# Patient Record
Sex: MALE | Race: WHITE | NOT HISPANIC OR LATINO | Employment: UNEMPLOYED | RURAL
[De-identification: names, ages, dates, MRNs, and addresses within clinical notes are randomized per-mention and may not be internally consistent; named-entity substitution may affect disease eponyms.]

---

## 2017-09-21 ENCOUNTER — OFFICE VISIT (OUTPATIENT)
Dept: CARDIOLOGY | Age: 57
End: 2017-09-21

## 2017-09-21 VITALS
OXYGEN SATURATION: 98 % | HEART RATE: 74 BPM | SYSTOLIC BLOOD PRESSURE: 112 MMHG | HEIGHT: 78 IN | BODY MASS INDEX: 35.87 KG/M2 | WEIGHT: 310 LBS | DIASTOLIC BLOOD PRESSURE: 68 MMHG

## 2017-09-21 DIAGNOSIS — I48.91 ATRIAL FIBRILLATION, UNSPECIFIED TYPE (CMD): Primary | ICD-10-CM

## 2017-09-21 PROCEDURE — 93000 ELECTROCARDIOGRAM COMPLETE: CPT | Performed by: INTERNAL MEDICINE

## 2017-09-21 PROCEDURE — 99204 OFFICE O/P NEW MOD 45 MIN: CPT | Performed by: INTERNAL MEDICINE

## 2017-09-21 RX ORDER — CARVEDILOL 3.12 MG/1
3.12 TABLET ORAL 2 TIMES DAILY WITH MEALS
COMMUNITY

## 2017-09-21 RX ORDER — LISINOPRIL 20 MG/1
20 TABLET ORAL DAILY
COMMUNITY

## 2017-09-21 RX ORDER — SPIRONOLACTONE 25 MG/1
25 TABLET ORAL DAILY
COMMUNITY

## 2017-09-21 RX ORDER — WARFARIN SODIUM 4 MG/1
4 TABLET ORAL DAILY
COMMUNITY
End: 2017-11-14 | Stop reason: SDUPTHER

## 2017-10-24 ENCOUNTER — HOSPITAL ENCOUNTER (OUTPATIENT)
Dept: CV DIAGNOSTICS | Age: 57
Discharge: HOME | End: 2017-10-24
Attending: INTERNAL MEDICINE

## 2017-10-24 ENCOUNTER — OFF PREMISE CHARGES (OUTPATIENT)
Dept: CARDIOLOGY | Age: 57
End: 2017-10-24

## 2017-10-24 DIAGNOSIS — I48.91 ATRIAL FIBRILLATION, UNSPECIFIED TYPE (CMD): ICD-10-CM

## 2017-10-24 DIAGNOSIS — I48.91 ATRIAL FIBRILLATION (CMD): ICD-10-CM

## 2017-10-24 LAB
ASCENDING AORTA (AAD): 3.7 CM
AV PEAK GRADIENT (AVPG): 8.9 MMHG
AV PEAK VELOCITY (AVPV): 1.5 M/S
DOP CALC LVOT PEAK VEL (LVOTPV): 0.8 M/S
EST RIGHT VENT SYSTOLIC PRESSURE BY TRICUSPID REGURGITATION JET (RVSP): 34 MMHG
INTERVENTRICULAR SEPTUM IN END DIASTOLE (IVSD): 1 CM
LEFT VENTRICULAR INTERNAL DIMENSION IN DIASTOLE (LVDD): 5.8 CM
LEFT VENTRICULAR POSTERIOR WALL IN END DIASTOLE (LVPW): 1.1 CM
LV EF: 35 %
LVOT 2D (LVOTD): 2.5 CM
MV E TISSUE VEL MED (MESV): 9 CM/S
MV E WAVE VEL/E TISSUE VEL MED(MSR): 9
MV PEAK E VELOCITY (MVPEV): 0.8 M/S
RV TISSUE DOPPLER FREE WALL HEART RATE (RVSTV): 0.2 M/S
TRICUSPID VALVE PEAK REGURGITATION VELOCITY (TRPV): 2.8 M/S
TV ESTIMATED RIGHT ARTERIAL PRESSURE (RAP): 3 MMHG

## 2017-10-24 PROCEDURE — 93306 TTE W/DOPPLER COMPLETE: CPT | Performed by: INTERNAL MEDICINE

## 2017-10-26 ENCOUNTER — TELEPHONE (OUTPATIENT)
Dept: CARDIOLOGY | Age: 57
End: 2017-10-26

## 2017-10-26 DIAGNOSIS — I48.91 ATRIAL FIBRILLATION, UNSPECIFIED TYPE (CMD): Primary | ICD-10-CM

## 2017-10-26 DIAGNOSIS — R93.1 ABNORMAL ECHOCARDIOGRAM: ICD-10-CM

## 2017-11-02 ENCOUNTER — LAB SERVICES (OUTPATIENT)
Dept: LAB | Age: 57
End: 2017-11-02

## 2017-11-02 ENCOUNTER — TELEPHONE (OUTPATIENT)
Dept: CARDIOLOGY | Age: 57
End: 2017-11-02

## 2017-11-02 DIAGNOSIS — I48.91 ATRIAL FIBRILLATION, UNSPECIFIED TYPE (CMD): Primary | ICD-10-CM

## 2017-11-02 DIAGNOSIS — G45.9 TRANSIENT CEREBRAL ISCHEMIA, UNSPECIFIED TYPE: ICD-10-CM

## 2017-11-02 DIAGNOSIS — Z51.81 ENCOUNTER FOR THERAPEUTIC DRUG MONITORING: ICD-10-CM

## 2017-11-02 DIAGNOSIS — I48.91 ATRIAL FIBRILLATION, UNSPECIFIED TYPE (CMD): ICD-10-CM

## 2017-11-02 PROCEDURE — 36415 COLL VENOUS BLD VENIPUNCTURE: CPT | Performed by: FAMILY MEDICINE

## 2017-11-02 PROCEDURE — 85610 PROTHROMBIN TIME: CPT | Performed by: INTERNAL MEDICINE

## 2017-11-03 ENCOUNTER — TELEPHONE (OUTPATIENT)
Dept: CARDIOLOGY | Age: 57
End: 2017-11-03

## 2017-11-03 DIAGNOSIS — I48.91 ATRIAL FIBRILLATION, UNSPECIFIED TYPE (CMD): ICD-10-CM

## 2017-11-03 DIAGNOSIS — G45.9 TRANSIENT CEREBRAL ISCHEMIA, UNSPECIFIED TYPE: ICD-10-CM

## 2017-11-03 DIAGNOSIS — Z51.81 ENCOUNTER FOR THERAPEUTIC DRUG MONITORING: ICD-10-CM

## 2017-11-03 LAB
INR PPP: 1.6
PROTHROMBIN TIME: 17.8 SEC (ref 9.7–11.8)

## 2017-11-06 ENCOUNTER — OFFICE VISIT (OUTPATIENT)
Dept: ELECTROPHYSIOLOGY | Age: 57
End: 2017-11-06
Attending: INTERNAL MEDICINE

## 2017-11-06 ENCOUNTER — TELEPHONE (OUTPATIENT)
Dept: ELECTROPHYSIOLOGY | Age: 57
End: 2017-11-06

## 2017-11-06 ENCOUNTER — TELEPHONE (OUTPATIENT)
Dept: CARDIOLOGY | Age: 57
End: 2017-11-06

## 2017-11-06 VITALS
DIASTOLIC BLOOD PRESSURE: 86 MMHG | BODY MASS INDEX: 36.45 KG/M2 | HEART RATE: 62 BPM | WEIGHT: 315 LBS | SYSTOLIC BLOOD PRESSURE: 130 MMHG | HEIGHT: 78 IN | OXYGEN SATURATION: 98 %

## 2017-11-06 DIAGNOSIS — I48.91 ATRIAL FIBRILLATION, UNSPECIFIED TYPE (CMD): ICD-10-CM

## 2017-11-06 DIAGNOSIS — Z51.81 ENCOUNTER FOR THERAPEUTIC DRUG MONITORING: ICD-10-CM

## 2017-11-06 DIAGNOSIS — I48.19 PERSISTENT ATRIAL FIBRILLATION (CMD): Primary | ICD-10-CM

## 2017-11-06 DIAGNOSIS — G45.9 TRANSIENT CEREBRAL ISCHEMIA, UNSPECIFIED TYPE: ICD-10-CM

## 2017-11-06 DIAGNOSIS — Z79.899 ENCOUNTER FOR MONITORING AMIODARONE THERAPY: Primary | ICD-10-CM

## 2017-11-06 DIAGNOSIS — Z51.81 ENCOUNTER FOR MONITORING AMIODARONE THERAPY: Primary | ICD-10-CM

## 2017-11-06 DIAGNOSIS — R93.1 ABNORMAL ECHOCARDIOGRAM: ICD-10-CM

## 2017-11-06 PROCEDURE — 99205 OFFICE O/P NEW HI 60 MIN: CPT | Performed by: INTERNAL MEDICINE

## 2017-11-06 PROCEDURE — 93010 ELECTROCARDIOGRAM REPORT: CPT | Performed by: INTERNAL MEDICINE

## 2017-11-06 PROCEDURE — 99211 OFF/OP EST MAY X REQ PHY/QHP: CPT | Performed by: INTERNAL MEDICINE

## 2017-11-06 PROCEDURE — 93005 ELECTROCARDIOGRAM TRACING: CPT | Performed by: INTERNAL MEDICINE

## 2017-11-06 RX ORDER — AMIODARONE HYDROCHLORIDE 200 MG/1
TABLET ORAL
Qty: 90 TABLET | Refills: 5 | Status: SHIPPED | OUTPATIENT
Start: 2017-11-06

## 2017-11-09 ENCOUNTER — LAB SERVICES (OUTPATIENT)
Dept: LAB | Age: 57
End: 2017-11-09

## 2017-11-09 ENCOUNTER — TELEPHONE (OUTPATIENT)
Dept: CARDIOLOGY | Age: 57
End: 2017-11-09

## 2017-11-09 DIAGNOSIS — Z51.81 ENCOUNTER FOR THERAPEUTIC DRUG MONITORING: ICD-10-CM

## 2017-11-09 DIAGNOSIS — G45.9 TRANSIENT CEREBRAL ISCHEMIA, UNSPECIFIED TYPE: ICD-10-CM

## 2017-11-09 DIAGNOSIS — I48.91 ATRIAL FIBRILLATION, UNSPECIFIED TYPE (CMD): ICD-10-CM

## 2017-11-09 PROCEDURE — 36415 COLL VENOUS BLD VENIPUNCTURE: CPT | Performed by: FAMILY MEDICINE

## 2017-11-09 PROCEDURE — 85610 PROTHROMBIN TIME: CPT | Performed by: INTERNAL MEDICINE

## 2017-11-10 ENCOUNTER — TELEPHONE (OUTPATIENT)
Dept: CARDIOLOGY | Age: 57
End: 2017-11-10

## 2017-11-10 DIAGNOSIS — Z51.81 ENCOUNTER FOR THERAPEUTIC DRUG MONITORING: ICD-10-CM

## 2017-11-10 DIAGNOSIS — I48.91 ATRIAL FIBRILLATION, UNSPECIFIED TYPE (CMD): ICD-10-CM

## 2017-11-10 DIAGNOSIS — G45.9 TRANSIENT CEREBRAL ISCHEMIA, UNSPECIFIED TYPE: ICD-10-CM

## 2017-11-10 LAB
INR PPP: 2
PROTHROMBIN TIME: 22.1 SEC (ref 9.7–11.8)

## 2017-11-13 DIAGNOSIS — I48.19 PERSISTENT ATRIAL FIBRILLATION (CMD): Primary | ICD-10-CM

## 2017-11-13 RX ORDER — LIDOCAINE HYDROCHLORIDE 10 MG/ML
.5-1 INJECTION, SOLUTION INFILTRATION; PERINEURAL PRN
Status: CANCELLED | OUTPATIENT
Start: 2017-11-13

## 2017-11-13 RX ORDER — SODIUM CHLORIDE, SODIUM LACTATE, POTASSIUM CHLORIDE, CALCIUM CHLORIDE 600; 310; 30; 20 MG/100ML; MG/100ML; MG/100ML; MG/100ML
INJECTION, SOLUTION INTRAVENOUS CONTINUOUS
Status: CANCELLED | OUTPATIENT
Start: 2017-11-13

## 2017-11-13 RX ORDER — 0.9 % SODIUM CHLORIDE 0.9 %
2 VIAL (ML) INJECTION EVERY 12 HOURS SCHEDULED
Status: CANCELLED | OUTPATIENT
Start: 2017-11-13

## 2017-11-13 RX ORDER — 0.9 % SODIUM CHLORIDE 0.9 %
2 VIAL (ML) INJECTION PRN
Status: CANCELLED | OUTPATIENT
Start: 2017-11-13

## 2017-11-13 RX ORDER — LIDOCAINE AND PRILOCAINE 25; 25 MG/G; MG/G
1 CREAM TOPICAL PRN
Status: CANCELLED | OUTPATIENT
Start: 2017-11-13

## 2017-11-13 RX ORDER — POTASSIUM CHLORIDE 14.9 MG/ML
40 INJECTION INTRAVENOUS PRN
Status: CANCELLED | OUTPATIENT
Start: 2017-11-13

## 2017-11-13 RX ORDER — SODIUM CHLORIDE 9 MG/ML
INJECTION, SOLUTION INTRAVENOUS CONTINUOUS
Status: CANCELLED | OUTPATIENT
Start: 2017-11-13

## 2017-11-13 RX ORDER — HUMAN INSULIN 100 [IU]/ML
INJECTION, SOLUTION SUBCUTANEOUS
Status: CANCELLED | OUTPATIENT
Start: 2017-11-13

## 2017-11-14 ENCOUNTER — TELEPHONE (OUTPATIENT)
Dept: CARDIOLOGY | Age: 57
End: 2017-11-14

## 2017-11-14 DIAGNOSIS — I48.91 ATRIAL FIBRILLATION, UNSPECIFIED TYPE (CMD): ICD-10-CM

## 2017-11-14 RX ORDER — WARFARIN SODIUM 4 MG/1
TABLET ORAL
Qty: 30 TABLET | Refills: 5 | Status: SHIPPED | OUTPATIENT
Start: 2017-11-14

## 2017-11-16 ENCOUNTER — TELEPHONE (OUTPATIENT)
Dept: CARDIOLOGY | Age: 57
End: 2017-11-16

## 2017-11-16 ENCOUNTER — LAB SERVICES (OUTPATIENT)
Dept: LAB | Age: 57
End: 2017-11-16

## 2017-11-16 DIAGNOSIS — I48.91 ATRIAL FIBRILLATION, UNSPECIFIED TYPE (CMD): ICD-10-CM

## 2017-11-16 PROCEDURE — 36415 COLL VENOUS BLD VENIPUNCTURE: CPT | Performed by: FAMILY MEDICINE

## 2017-11-16 PROCEDURE — 85610 PROTHROMBIN TIME: CPT | Performed by: INTERNAL MEDICINE

## 2017-11-17 ENCOUNTER — TELEPHONE (OUTPATIENT)
Dept: CARDIOLOGY | Age: 57
End: 2017-11-17

## 2017-11-17 DIAGNOSIS — Z51.81 ENCOUNTER FOR THERAPEUTIC DRUG MONITORING: ICD-10-CM

## 2017-11-17 DIAGNOSIS — I48.91 ATRIAL FIBRILLATION, UNSPECIFIED TYPE (CMD): ICD-10-CM

## 2017-11-17 DIAGNOSIS — G45.9 TRANSIENT CEREBRAL ISCHEMIA, UNSPECIFIED TYPE: ICD-10-CM

## 2017-11-17 LAB
INR PPP: 2.7
PROTHROMBIN TIME: 29 SEC (ref 9.7–11.8)

## 2017-11-22 ENCOUNTER — LAB SERVICES (OUTPATIENT)
Dept: LAB | Age: 57
End: 2017-11-22

## 2017-11-22 DIAGNOSIS — I48.91 ATRIAL FIBRILLATION, UNSPECIFIED TYPE (CMD): ICD-10-CM

## 2017-11-22 PROCEDURE — 36415 COLL VENOUS BLD VENIPUNCTURE: CPT | Performed by: FAMILY MEDICINE

## 2017-11-22 PROCEDURE — 85610 PROTHROMBIN TIME: CPT | Performed by: INTERNAL MEDICINE

## 2017-11-23 LAB
INR PPP: 3.7
PROTHROMBIN TIME: 40.4 SEC (ref 9.7–11.8)

## 2017-11-24 ENCOUNTER — TELEPHONE (OUTPATIENT)
Dept: CARDIOLOGY | Age: 57
End: 2017-11-24

## 2017-11-24 ENCOUNTER — TELEPHONE (OUTPATIENT)
Dept: ELECTROPHYSIOLOGY | Age: 57
End: 2017-11-24

## 2017-11-24 DIAGNOSIS — I48.91 ATRIAL FIBRILLATION, UNSPECIFIED TYPE (CMD): ICD-10-CM

## 2017-11-24 DIAGNOSIS — G45.9 TRANSIENT CEREBRAL ISCHEMIA, UNSPECIFIED TYPE: ICD-10-CM

## 2017-11-24 DIAGNOSIS — Z51.81 ENCOUNTER FOR THERAPEUTIC DRUG MONITORING: ICD-10-CM

## 2017-11-27 ENCOUNTER — TELEPHONE (OUTPATIENT)
Dept: ELECTROPHYSIOLOGY | Age: 57
End: 2017-11-27

## 2017-12-01 ENCOUNTER — TELEPHONE (OUTPATIENT)
Dept: CARDIOLOGY | Age: 57
End: 2017-12-01

## 2017-12-01 DIAGNOSIS — Z51.81 ENCOUNTER FOR THERAPEUTIC DRUG MONITORING: ICD-10-CM

## 2017-12-01 DIAGNOSIS — I48.91 ATRIAL FIBRILLATION, UNSPECIFIED TYPE (CMD): ICD-10-CM

## 2017-12-01 DIAGNOSIS — G45.9 TRANSIENT CEREBRAL ISCHEMIA, UNSPECIFIED TYPE: ICD-10-CM

## 2017-12-07 ENCOUNTER — TELEPHONE (OUTPATIENT)
Dept: FAMILY MEDICINE | Age: 57
End: 2017-12-07

## 2017-12-07 ENCOUNTER — LAB SERVICES (OUTPATIENT)
Dept: LAB | Age: 57
End: 2017-12-07

## 2017-12-07 DIAGNOSIS — I48.91 ATRIAL FIBRILLATION, UNSPECIFIED TYPE (CMD): ICD-10-CM

## 2017-12-07 LAB
INR PPP: 2.8
PROTHROMBIN TIME: 26.9 SEC (ref 9.7–11.8)

## 2017-12-07 PROCEDURE — 85610 PROTHROMBIN TIME: CPT | Performed by: INTERNAL MEDICINE

## 2017-12-07 PROCEDURE — 36415 COLL VENOUS BLD VENIPUNCTURE: CPT | Performed by: FAMILY MEDICINE

## 2017-12-08 ENCOUNTER — TELEPHONE (OUTPATIENT)
Dept: CARDIOLOGY | Age: 57
End: 2017-12-08

## 2017-12-08 DIAGNOSIS — G45.9 TRANSIENT CEREBRAL ISCHEMIA, UNSPECIFIED TYPE: ICD-10-CM

## 2017-12-08 DIAGNOSIS — I48.91 ATRIAL FIBRILLATION, UNSPECIFIED TYPE (CMD): ICD-10-CM

## 2017-12-08 DIAGNOSIS — Z51.81 ENCOUNTER FOR THERAPEUTIC DRUG MONITORING: ICD-10-CM

## 2017-12-14 ENCOUNTER — APPOINTMENT (OUTPATIENT)
Dept: CT IMAGING | Age: 57
End: 2017-12-14
Attending: INTERNAL MEDICINE

## 2017-12-18 ENCOUNTER — APPOINTMENT (OUTPATIENT)
Dept: CV DIAGNOSTICS | Age: 57
End: 2017-12-18
Attending: INTERNAL MEDICINE

## 2017-12-29 ENCOUNTER — TELEPHONE (OUTPATIENT)
Dept: CARDIOLOGY | Age: 57
End: 2017-12-29

## 2018-01-04 ENCOUNTER — TELEPHONE (OUTPATIENT)
Dept: CARDIOLOGY | Age: 58
End: 2018-01-04

## 2018-01-04 DIAGNOSIS — Z51.81 ENCOUNTER FOR THERAPEUTIC DRUG MONITORING: ICD-10-CM

## 2018-01-04 DIAGNOSIS — I48.91 ATRIAL FIBRILLATION, UNSPECIFIED TYPE (CMD): ICD-10-CM

## 2018-01-04 DIAGNOSIS — G45.9 TRANSIENT CEREBRAL ISCHEMIA, UNSPECIFIED TYPE: ICD-10-CM

## 2018-01-09 ENCOUNTER — TELEPHONE (OUTPATIENT)
Dept: CARDIOLOGY | Age: 58
End: 2018-01-09

## 2018-01-09 DIAGNOSIS — G45.9 TRANSIENT CEREBRAL ISCHEMIA, UNSPECIFIED TYPE: ICD-10-CM

## 2018-01-09 DIAGNOSIS — I48.91 ATRIAL FIBRILLATION, UNSPECIFIED TYPE (CMD): ICD-10-CM

## 2018-01-09 DIAGNOSIS — Z51.81 ENCOUNTER FOR THERAPEUTIC DRUG MONITORING: ICD-10-CM

## 2018-01-26 ENCOUNTER — APPOINTMENT (OUTPATIENT)
Dept: ELECTROPHYSIOLOGY | Age: 58
End: 2018-01-26
Attending: INTERNAL MEDICINE

## 2022-01-14 DIAGNOSIS — U07.1 COVID-19: Primary | ICD-10-CM

## 2022-01-14 RX ORDER — ALBUTEROL SULFATE 90 UG/1
2-3 INHALANT RESPIRATORY (INHALATION) AS NEEDED
Status: CANCELLED | OUTPATIENT
Start: 2022-01-18

## 2022-01-14 RX ORDER — DIPHENHYDRAMINE HYDROCHLORIDE 50 MG/ML
25-50 INJECTION INTRAMUSCULAR; INTRAVENOUS AS NEEDED
Status: CANCELLED | OUTPATIENT
Start: 2022-01-18

## 2022-01-14 RX ORDER — ACETAMINOPHEN 500 MG
500 TABLET ORAL EVERY 4 HOURS PRN
Status: CANCELLED | OUTPATIENT
Start: 2022-01-18

## 2022-01-14 RX ORDER — SODIUM CHLORIDE 9 MG/ML
0-999 INJECTION, SOLUTION INTRAVENOUS CONTINUOUS PRN
Status: CANCELLED | OUTPATIENT
Start: 2022-01-18 | End: 2022-01-18

## 2022-01-14 RX ORDER — FAMOTIDINE 10 MG/ML
20 INJECTION INTRAVENOUS AS NEEDED
Status: CANCELLED | OUTPATIENT
Start: 2022-01-18

## 2022-01-14 RX ORDER — EPINEPHRINE 1 MG/ML
0.3 INJECTION INTRAMUSCULAR; INTRAVENOUS; SUBCUTANEOUS AS NEEDED
Status: CANCELLED | OUTPATIENT
Start: 2022-01-18

## 2022-01-18 ENCOUNTER — HOSPITAL ENCOUNTER (OUTPATIENT)
Dept: INFUSION THERAPY | Facility: HOSPITAL | Age: 62
Discharge: HOME | End: 2022-01-18
Payer: MEDICARE

## 2022-01-18 VITALS
SYSTOLIC BLOOD PRESSURE: 116 MMHG | RESPIRATION RATE: 18 BRPM | DIASTOLIC BLOOD PRESSURE: 57 MMHG | OXYGEN SATURATION: 94 % | TEMPERATURE: 97.8 F | HEART RATE: 68 BPM

## 2022-01-18 DIAGNOSIS — U07.1 COVID-19: Primary | ICD-10-CM

## 2022-01-18 PROCEDURE — 6360000200 HC RX 636 W HCPCS (ALT 250 FOR IP): Mod: FB | Performed by: NURSE PRACTITIONER

## 2022-01-18 PROCEDURE — M0247 HC IV INFUSION, SOTROVIMAB, INCLUDES INFUSION AND POST ADMINISTRATION MONITORING: HCPCS | Performed by: NURSE PRACTITIONER

## 2022-01-18 RX ADMIN — SOTROVIMAB 500 MG: 62.5 INJECTION, SOLUTION, CONCENTRATE INTRAVENOUS at 09:38

## 2022-03-28 ENCOUNTER — TELEPHONE (OUTPATIENT)
Dept: RHEUMATOLOGY | Facility: CLINIC | Age: 62
End: 2022-03-28
Payer: MEDICARE

## 2022-03-28 ENCOUNTER — OFFICE VISIT (OUTPATIENT)
Dept: RHEUMATOLOGY | Facility: CLINIC | Age: 62
End: 2022-03-28
Payer: MEDICARE

## 2022-03-28 VITALS
BODY MASS INDEX: 39.31 KG/M2 | SYSTOLIC BLOOD PRESSURE: 121 MMHG | WEIGHT: 315 LBS | RESPIRATION RATE: 20 BRPM | HEART RATE: 82 BPM | OXYGEN SATURATION: 97 % | DIASTOLIC BLOOD PRESSURE: 62 MMHG

## 2022-03-28 DIAGNOSIS — Z71.89 OTHER SPECIFIED COUNSELING: ICD-10-CM

## 2022-03-28 DIAGNOSIS — M10.9 GOUT OF MULTIPLE SITES, UNSPECIFIED CAUSE, UNSPECIFIED CHRONICITY: Primary | ICD-10-CM

## 2022-03-28 DIAGNOSIS — Z79.899 MEDICATION MANAGEMENT: ICD-10-CM

## 2022-03-28 DIAGNOSIS — M79.646 PAIN OF FINGER, UNSPECIFIED LATERALITY: ICD-10-CM

## 2022-03-28 PROCEDURE — 99205 OFFICE O/P NEW HI 60 MIN: CPT | Performed by: INTERNAL MEDICINE

## 2022-03-28 PROCEDURE — G0463 HOSPITAL OUTPT CLINIC VISIT: HCPCS | Mod: PO | Performed by: INTERNAL MEDICINE

## 2022-03-28 RX ORDER — FEBUXOSTAT 80 MG/1
TABLET, FILM COATED ORAL
Qty: 37 TABLET | Refills: 0 | Status: SHIPPED | OUTPATIENT
Start: 2022-03-28 | End: 2022-04-15 | Stop reason: DRUGHIGH

## 2022-03-28 RX ORDER — COLCHICINE 0.6 MG/1
0.6 TABLET ORAL DAILY
Qty: 30 TABLET | Refills: 0 | Status: SHIPPED | OUTPATIENT
Start: 2022-03-28 | End: 2022-03-28

## 2022-03-28 RX ORDER — COLCHICINE 0.6 MG/1
0.6 TABLET ORAL EVERY OTHER DAY
Qty: 15 TABLET | Refills: 0 | Status: SHIPPED | OUTPATIENT
Start: 2022-03-28 | End: 2022-05-24 | Stop reason: SDUPTHER

## 2022-03-28 ASSESSMENT — PAIN SCALES - GENERAL: PAINLEVEL: 3

## 2022-03-28 NOTE — LETTER
Formerly [facility], [facility] RHEUMATOLOGY  [address] [facility] [address]  [phone]  Dept: [phone]  04/01/22      TONO Blank  [address] [facility] [place] [address]        To: TONO Blank      Thank you for referring your patient, Brando Sexton, to receive care in my office. I have enclosed a summary of the care provided to Brando on 04/01/22.    Please contact me with any questions you may have regarding the visit.    Sincerely,         Doris Yates MD  [address] [facility] [place] [address]  [phone]    CC: TONO Grimes

## 2022-03-28 NOTE — PATIENT INSTRUCTIONS
1 we need to optimize your gout to have your uric acid below 6 so you stop having gout flares.    You were going to start colchicine 1 tablet every other day to prevent gout flares as we are lowering your uric acid.    2.  To reduce your uric acid I am prescribing Uloric you will take half of a tablet daily for 14 days then go get labs at your local [place] facility.  The labs will be under my name.    3.  After you get the labs we will call you with the uric acid level and ask you to increase the dose to 80 mg daily.  In 2 weeks once again we will go get a uric acid and see what the level is.  I will see you in 2 months after beginning the medication for a follow-up visit and we will go from there.

## 2022-03-28 NOTE — TELEPHONE ENCOUNTER
Called Magali at [facility] [phone] who will fax over the impression notes for now until images can be pushed.  Stephany calls now to advise she is also pushing images as we speak.

## 2022-03-28 NOTE — PROGRESS NOTES
HPI:  Brando Sexton is a 62 y.o. male with a history of gout who is here for an initial consultation for right fifth PIP swelling and pain.    He was evaluated by [facility] urgent care on February 25, 2022 for right hand pain with the inability to flex his fifth finger.  The digit did have erythema and tenderness as well as decreased range of motion.  He also had similar symptoms at that time involving his third right finger.  In the note they mentioned he had been seen 2 other times for the same type of inflammatory arthritis and had been given steroids as well as a shot of ceftriaxone previously.  They referred him for further evaluation and also noted he has a history of psoriasis.    Jc states that he does not have extensive psoriasis on his skin.  He states that he has gout and is doing all he can by monitoring his diet and ensuring that he does not eat shellfish and minimizes his red meat.  He also is taking vitamin C every day.  Previously he had been given probenecid which did not prevent or treat his gout flares.  As far as I am aware he does not have a history of chronic kidney disease but has a past medical history of atrial fibrillation and is on chronic anticoagulation with warfarin.    He states that he has had gout for many years and has been given allopurinol in the past but did have side effects of mood changes on allopurinol so it is contraindicated.  NSAIDs are contraindicated due to his chronic anticoagulation.  His last uric acid was in the 10 range and previous to that it was in the 11 range.    We did discuss the mechanism of gout as well as how it is treated acutely and chronically.  We did go over the handout from the [employer] of [employer] on gout.  I did discuss with him that our goal will be a uric acid below 6.    Unfortunately there is only one uric acid lowering medication left that is oral and that is Uloric.  He has no contraindications to Uloric.  His  cardiovascular disease is under good control as is his blood pressure.  He was recently switched from diltiazem to Lasix which of course exacerbated his gout.  I am unsure as to whether losartan has been tried or not in the past.  This is a medication that can help reduce gout flares and treat hypertension at the same time.    Today in his right hand he continues to experience erythema in the fifth digit as well as inflammation and swelling along his second and third MCP.  Morning stiffness in his joints is approximately 2 to 3 hours.  Pain over the last week is 6 out of 10.  He describes severe pain in his right fifth finger and moderate pain in his left knee and right fingers, right knee, right ankle and right toes.  He describes mild pain in his right wrist, right hip, left fingers and left ankle.  He is currently not taking probenecid.  Review of systems is positive for dental problems, tinnitus, joint swelling and pain.    ROS:  See HPI    History:  Past Medical History:   Diagnosis Date   • Atrial fibrillation (CMS/HCC) (HCC)    • Gout    • Psoriasis      Past Surgical History:   Procedure Laterality Date   • CARDIOVERSION  03/2010   • COLONOSCOPY  01/01/1995   • ORTHOPEDIC SURGERY Bilateral     Knee Scope   • OTHER SURGICAL HISTORY Right     Orthopedic:Rotator cuff x2 Right shoulder     Social History     Socioeconomic History   • Marital status:      Spouse name: Not on file   • Number of children: Not on file   • Years of education: Not on file   • Highest education level: Not on file   Occupational History   • Not on file   Tobacco Use   • Smoking status: Former Smoker   • Smokeless tobacco: Former User   Substance and Sexual Activity   • Alcohol use: No   • Drug use: Not on file   • Sexual activity: Not on file   Other Topics Concern   • Not on file   Social History Narrative   • Not on file     Social Determinants of Health     Financial Resource Strain: Not on file   Food Insecurity: Not on file    Transportation Needs: Not on file   Physical Activity: Not on file   Stress: Not on file   Social Connections: Not on file   Intimate Partner Violence: Not on file   Housing Stability: Not on file     Family History   Problem Relation Age of Onset   • Heart disease Mother    • Colon cancer Mother    • Heart disease Father         MI at age 87   • Other Father         Crohns   • Psoriasis Father    • Asthma Father    • Diabetes type II Father's Sister    • Heart disease Father's Brother        Objective:  Vitals:    03/28/22 0901 03/28/22 0905   BP: 121/62    BP Location: Left arm    Patient Position: Sitting    Cuff Size: Large Adult    Pulse: 82    Resp: 20    SpO2:  97%   Weight: (!) 145.5 kg (320 lb 12.8 oz)       Physical Exam  Limited to his hands right fifth PIP was erythematous and slightly tender and unable to extend straight.  He had fullness of his second and third right MCP.    Allergies:  Allopurinol and Cephalexin    Medications:  Current Outpatient Medications on File Prior to Visit   Medication Sig Dispense Refill   • CARVEDILOL ORAL Take by mouth Unknown mg     • ascorbic acid (VITAMIN C ORAL) Take by mouth     • lisinopril (PRINIVIL,ZESTRIL) 10 mg tablet Take 1 tablet(s) every day by oral route. 30 2   • furosemide (LASIX) 20 mg tablet 2/day 180 3   • warfarin (COUMADIN) 5 mg tablet 4 mg Alternates 2 mg with 4 mg every other day. 50 0   • albuterol HFA (PROVENTIL HFA;VENTOLIN HFA) 90 mcg/actuation inhaler Inhale 2 puffs every 4 hours by inhalation route. 1 0     No current facility-administered medications on file prior to visit.         Labs:  CBC:     WBC   Date Value Ref Range Status   01/18/2016 13.0 (H) 3.7 - 9.6 X10      RBC   Date Value Ref Range Status   01/18/2016 5.04 4.10 - 5.80 X10      Hemoglobin   Date Value Ref Range Status   01/18/2016 15.9 13.2 - 17.2 g/dL      Hematocrit   Date Value Ref Range Status   01/18/2016 47.8 38.0 - 50.0 %      Platelets   Date Value Ref Range Status    01/18/2016 209 140 - 450 10      CMP:   Sodium   Date Value Ref Range Status   08/10/2015 141 135 - 145 mmol/L      Potassium   Date Value Ref Range Status   08/10/2015 4.3 3.6 - 5.0 mmol/L      Chloride   Date Value Ref Range Status   08/10/2015 108 (H) 98 - 107 mmol/L      CO2   Date Value Ref Range Status   08/10/2015 25 21 - 32 mmol/L      Glucose   Date Value Ref Range Status   08/10/2015 102 70 - 110 mg/dL      Creatinine   Date Value Ref Range Status   08/10/2015 1.2 0.8 - 1.3 mg/dL      Calcium   Date Value Ref Range Status   08/10/2015 8.7 8.5 - 10.1 mg/dL      Albumin   Date Value Ref Range Status   08/10/2015 3.7 3.4 - 5.0 g/dL      Alkaline Phosphatase   Date Value Ref Range Status   08/10/2015 96 45 - 115 IU/L      Total Bilirubin   Date Value Ref Range Status   08/10/2015 0.77 0.00 - 1.40 mg/dL      ALT (SGPT)   Date Value Ref Range Status   08/10/2015 40 <78 IU/L      AST   Date Value Ref Range Status   08/10/2015 30 0 - 37 IU/L      BUN   Date Value Ref Range Status   08/10/2015 20 7 - 22 mg/dL      Anion Gap   Date Value Ref Range Status   08/10/2015 9 3 - 11 mmol/L        Labs dated 3/20/2022 included a uric acid of 10.2.  On February 25, 2022 he tested negative for the antinuclear antibody.  His C-reactive protein was 26 mm/h and his uric acid level was elevated at 11.4.        Assessment/Plan:    Diagnoses and all orders for this visit:    Gout of multiple sites, unspecified cause, unspecified chronicity  -     CBC w/auto differential Blood, Venous; Future  -     Comprehensive metabolic panel Blood, Venous; Future  -     C-reactive protein (Inflammation) Blood, Venous; Future  -     Sedimentation rate, automated Blood, Venous; Future  -     Uric acid Blood, Venous; Future  -     febuxostat (ULORIC) 80 mg tablet; Take 0.5 tablets (40 mg total) by mouth daily for 14 days, THEN 1 tablet (80 mg total) daily.  -     Discontinue: colchicine 0.6 mg tablet; Take 1 tablet (0.6 mg total) by mouth  daily  -     colchicine 0.6 mg tablet; Take 1 tablet (0.6 mg total) by mouth every other day    Pain of finger, unspecified laterality  -     Ambulatory referral to Rheumatology    Other specified counseling    Medication management        Plan of Care:  Jc is a very pleasant 62-year-old male with chronic longstanding gout that has moderate disease activity and his last uric acid was 10.2.     We are going to optimize his gout by starting him on colchicine at a dose of 0.6 mg every other day.  He is going to be on the reduced dose for 30 days.  He is on a reduced dose due to the interaction of his beta-blocker causing increased levels of colchicine.  I did explain to him that the purpose of the colchicine was to treat any inflammation or acute gout attacks well we are reducing his uric acid level with Uloric.    He has no contraindications to Uloric.  He is going to start 40 mg p.o. daily for 2 weeks then he is going to go in for a uric acid and other medication monitoring labs.  Then he is going to increase the dose to 80 mg p.o. daily.  We will wait another 2 weeks and obtain another uric acid level at that time.  I will see him back in 2 months and hopefully at that point his uric acid level will be near a goal of 6.    For acute flares I am going to recommend glucocorticoids versus allopurinol because of the interaction with the beta-blocker.  We did discuss that there are no other uric acid lowering medications that are available to him.  Krystexxa is generally reserved for tophaceous gout which she does not have.  We did go over diet modifications to reduce gout flares.  I also did recommend that if his primary care is able to switch his ACE inhibitor to losartan that may benefit his gout.    Discussed all in detail patient voiced understanding and is in agreement with plan.    A voice recognition program was used to aid in documentation of this record.  Sometimes words are not printed exactly as they were  spoken.  While efforts were made to carefully edit and correct any inaccuracies, some errors may be present; please take these into context.  Please contact the provider if areas are identified.    On this date of service 60 minutes of total time was spent in evaluation and management of this encounter.  On this date of service 60 minutes were spent in other reportable services.     Doris Yates MD    12:24 PM, 3/28/2022

## 2022-03-28 NOTE — TELEPHONE ENCOUNTER
Per Dr. Yates's request patient's pharmacy has been contacted to ensure that patient's Colchicine is filled with instruction to take the medication every other day. Spoke will Nelli and she states that they will change the prescription. Nothing further is needed at this time.

## 2022-03-28 NOTE — TELEPHONE ENCOUNTER
----- Message from Doris Yates MD sent at 3/27/2022 10:22 PM MDT -----  Please have the referring facility push imaging of his hands to us so I can review the images dueing his visit

## 2022-03-28 NOTE — TELEPHONE ENCOUNTER
Received voicemail from Maksim with [facility] pharmacy requesting a return phone call in regards to patient's prescription.  Returned phone call to [facility] pharmacy and spoke with Marilou. Marilou advises that there is an interaction between the patient's carvedilol and colchicine. Advised Marilou that per Dr. Yates's visit note she is aware of the interaction between the two medications and patient is taking the medication at a reduced dose of every other day for 30 days. Marilou verbalizes understanding. Nothing further is needed at this time.

## 2022-03-31 ENCOUNTER — TELEPHONE (OUTPATIENT)
Dept: RHEUMATOLOGY | Facility: CLINIC | Age: 62
End: 2022-03-31
Payer: MEDICARE

## 2022-03-31 NOTE — TELEPHONE ENCOUNTER
Brando returned a call back to the office. He is questioning if we can send Dr. Yates's recommendations on this information to his PCP so she is aware. Advised Dr. Yates has not signed her note yet but once it is signed we can forward it to his PCP Sydnee Zhou PA-C at the [facility]. He states understanding and agreement.    Dr. Yates, for your review. It looks like you already have your note set to send to his PCP once signed.

## 2022-03-31 NOTE — TELEPHONE ENCOUNTER
I returned a call back to Brando at phone number [phone] and advised him of Dr. Yates's note/recommendations below. He states understanding and will follow up with his PCP. He does report on the medication list on his AVS it shows two tablets daily under his Furosemide. He questions if we changed this medication. Recommended he take that medication as prescribed by whoever fills for him. Advised we did not change how he takes this medication. Advised whoever completed his medication listed when checking him in may have accidentally marked two daily. Recommended he again take as his PCP has prescribed/advised. He states understanding and agreement.

## 2022-03-31 NOTE — TELEPHONE ENCOUNTER
"Doris Yates MD  [facility] Rheumatology Staff ([place]) (Nurse) 4 minutes ago (2:41 PM)     I did not make changes to his furosemide. I only gave \"recommendations\" about his BP meds. It is up to his PCP to see if this is a good option. I can not dictate how his BP is treated that is up to his PCP    Message text      "

## 2022-03-31 NOTE — TELEPHONE ENCOUNTER
Patient calls into clinic and leaves voicemail inquiring if Dr. Yates has contacted his PCP in regards to changing his BP medication. Visit note on 03/28/2022 indicates that it would be beneficial for patient to be switched from his current ACE inhibitor to Losartan. Patient also states that on his after visit summary it states he takes his Furosemide twice daily. Patient states he believes he only takes it once daily.   Please review and advise on patient's BP medication. Will advise patient to continue taking his Furosemide as prescribed.

## 2022-04-01 NOTE — TELEPHONE ENCOUNTER
Per Dr. Yates: Note signed please send to PCP     Visit note routed to patient's PCP per request.

## 2022-04-12 ENCOUNTER — TELEPHONE (OUTPATIENT)
Dept: RHEUMATOLOGY | Facility: CLINIC | Age: 62
End: 2022-04-12
Payer: MEDICARE

## 2022-04-12 NOTE — TELEPHONE ENCOUNTER
Patient calls into clinic today and leaves a voicemail in regards to repeat labs. Patient requests return phone call.  Returned phone call to patient. Patient states he is unsure when her was suppose to have his labs drawn again. Advised patient that according to visit note on 03/28/2022 patient was instructed to repeat his labs 14 days after starting Uloric 40 mg daily. Patient states he was unable to start his Uloric until 03/30/2022. Patient states he will make an appointment at his local [facility] lab for 04/14/2022. Will await lab results.

## 2022-04-14 ENCOUNTER — APPOINTMENT (OUTPATIENT)
Dept: LAB | Facility: CLINIC | Age: 62
End: 2022-04-14
Payer: MEDICARE

## 2022-04-14 DIAGNOSIS — M10.9 GOUT OF MULTIPLE SITES, UNSPECIFIED CAUSE, UNSPECIFIED CHRONICITY: ICD-10-CM

## 2022-04-14 LAB
ALBUMIN SERPL-MCNC: 3.7 G/DL (ref 3.5–5.3)
ALP SERPL-CCNC: 79 U/L (ref 45–115)
ALT SERPL-CCNC: 12 U/L (ref 7–52)
ANION GAP SERPL CALC-SCNC: 6 MMOL/L (ref 3–11)
AST SERPL-CCNC: 15 U/L
BASOPHILS # BLD AUTO: 0.1 10*3/UL
BASOPHILS NFR BLD AUTO: 1 % (ref 0–2)
BILIRUB SERPL-MCNC: 0.77 MG/DL (ref 0.2–1.4)
BUN SERPL-MCNC: 24 MG/DL (ref 7–25)
CALCIUM ALBUM COR SERPL-MCNC: 9.2 MG/DL (ref 8.6–10.3)
CALCIUM SERPL-MCNC: 9 MG/DL (ref 8.6–10.3)
CHLORIDE SERPL-SCNC: 107 MMOL/L (ref 98–107)
CO2 SERPL-SCNC: 27 MMOL/L (ref 21–32)
CREAT SERPL-MCNC: 1.31 MG/DL (ref 0.7–1.3)
CRP SERPL-MCNC: 3.8 MG/L
EOSINOPHIL # BLD AUTO: 0.8 10*3/UL
EOSINOPHIL NFR BLD AUTO: 11 % (ref 0–3)
ERYTHROCYTE [DISTWIDTH] IN BLOOD BY AUTOMATED COUNT: 15.1 % (ref 11.5–15)
ERYTHROCYTE [SEDIMENTATION RATE] IN BLOOD: 13 MM/HR
GFR SERPL CREATININE-BSD FRML MDRD: 61 ML/MIN/1.73M*2
GLUCOSE SERPL-MCNC: 102 MG/DL (ref 70–105)
HCT VFR BLD AUTO: 40.5 % (ref 38–50)
HGB BLD-MCNC: 13.2 G/DL (ref 13.2–17.2)
LYMPHOCYTES # BLD AUTO: 1.3 10*3/UL
LYMPHOCYTES NFR BLD AUTO: 19 % (ref 15–47)
MCH RBC QN AUTO: 30.9 PG (ref 29–34)
MCHC RBC AUTO-ENTMCNC: 32.7 G/DL (ref 32–36)
MCV RBC AUTO: 94.5 FL (ref 82–97)
MONOCYTES # BLD AUTO: 0.7 10*3/UL
MONOCYTES NFR BLD AUTO: 9 % (ref 5–13)
NEUTROPHILS # BLD AUTO: 4.2 10*3/UL
NEUTROPHILS NFR BLD AUTO: 60 % (ref 46–70)
PLATELET # BLD AUTO: 246 10*3/UL (ref 130–350)
PMV BLD AUTO: 7.4 FL (ref 6.9–10.8)
POTASSIUM SERPL-SCNC: 4.5 MMOL/L (ref 3.5–5.1)
PROT SERPL-MCNC: 5.9 G/DL (ref 6–8.3)
RBC # BLD AUTO: 4.29 10*6/ΜL (ref 4.1–5.8)
SODIUM SERPL-SCNC: 140 MMOL/L (ref 135–145)
URATE SERPL-MCNC: 6.7 MG/DL (ref 4.4–7.6)
WBC # BLD AUTO: 7.1 10*3/UL (ref 3.7–9.6)

## 2022-04-14 PROCEDURE — 85652 RBC SED RATE AUTOMATED: CPT

## 2022-04-14 PROCEDURE — 80053 COMPREHEN METABOLIC PANEL: CPT

## 2022-04-14 PROCEDURE — 84550 ASSAY OF BLOOD/URIC ACID: CPT

## 2022-04-14 PROCEDURE — 36415 COLL VENOUS BLD VENIPUNCTURE: CPT

## 2022-04-14 PROCEDURE — 86140 C-REACTIVE PROTEIN: CPT

## 2022-04-14 PROCEDURE — 85025 COMPLETE CBC W/AUTO DIFF WBC: CPT

## 2022-04-14 NOTE — TELEPHONE ENCOUNTER
Per Dr. Yates: Please let him know his uric acid went down from 8.5 to 6.7. His liver and kidney and inflammation tests looked good. Increase Uloric to 1 tablet (80 mg) daily. Labs not needed until his follow up visit.    Patient has been notified of this information and verbalized understanding. Patient states that for the first week he was taking the Uloric he was accidentally taking 80 mg daily as he reports he did not read the directions on his prescription bottle. Patient will need a refill of Uloric before his follow up visit on 05/24/2022. Please review and advise.

## 2022-04-15 DIAGNOSIS — M10.9 GOUT OF MULTIPLE SITES, UNSPECIFIED CAUSE, UNSPECIFIED CHRONICITY: Primary | ICD-10-CM

## 2022-04-15 RX ORDER — FEBUXOSTAT 80 MG/1
80 TABLET, FILM COATED ORAL DAILY
Qty: 30 TABLET | Refills: 2 | Status: SHIPPED | OUTPATIENT
Start: 2022-04-15 | End: 2022-05-24 | Stop reason: SDUPTHER

## 2022-04-15 NOTE — TELEPHONE ENCOUNTER
Per Dr. Yates: Please send in Uloric 80 mg tabs. Take 1 tab po daily. 30 days 2 refills     Uloric refilled per Dr. Yates's request. Prescription sent to patient's preferred pharmacy.

## 2022-05-03 ENCOUNTER — TELEPHONE (OUTPATIENT)
Dept: RHEUMATOLOGY | Facility: CLINIC | Age: 62
End: 2022-05-03
Payer: MEDICARE

## 2022-05-03 NOTE — TELEPHONE ENCOUNTER
Jc left a voicemail yesterday evening reporting his wife is having a hard time finding alternatives to chicken meat for his sandwiches for lunch. He questions if Dr. Yates has any recommendations in regards to following diet for Gout. Patient called on phone number [phone].     Please advise.

## 2022-05-04 NOTE — TELEPHONE ENCOUNTER
Doris Yates MD  You 1 hour ago (8:23 AM)     Please let let him know that chicken is not a food that is high in purines.  The only reason he should stay away from chicken is if he feels that it aggravates his gout.  There are other alternatives such as turkey, ham and tuna.  They do have good websites on the Internet for foods that are safe for gout patients.  He can perform a Google search and type and foods that are safe for gout.      Message text

## 2022-05-04 NOTE — TELEPHONE ENCOUNTER
I returned a call back to Jc and advised him of Dr. Yates's note/recommendations. Patient reports he was also advised that he cannot have turkey either. Patient is unsure if we read this on the Internet or was advised by Dr. Patel. Jc is ok with me sending him information on a low purine diet through the mail. Information printed from Epic on a low purine diet and mailed to patient.

## 2022-05-24 ENCOUNTER — OFFICE VISIT (OUTPATIENT)
Dept: RHEUMATOLOGY | Facility: CLINIC | Age: 62
End: 2022-05-24
Payer: MEDICARE

## 2022-05-24 ENCOUNTER — APPOINTMENT (OUTPATIENT)
Dept: LAB | Facility: CLINIC | Age: 62
End: 2022-05-24
Payer: MEDICARE

## 2022-05-24 VITALS
TEMPERATURE: 98.6 F | SYSTOLIC BLOOD PRESSURE: 128 MMHG | HEART RATE: 83 BPM | HEIGHT: 78 IN | DIASTOLIC BLOOD PRESSURE: 73 MMHG | OXYGEN SATURATION: 96 % | RESPIRATION RATE: 16 BRPM | WEIGHT: 315 LBS | BODY MASS INDEX: 36.45 KG/M2

## 2022-05-24 DIAGNOSIS — Z71.89 OTHER SPECIFIED COUNSELING: ICD-10-CM

## 2022-05-24 DIAGNOSIS — M10.9 GOUT OF MULTIPLE SITES, UNSPECIFIED CAUSE, UNSPECIFIED CHRONICITY: Primary | ICD-10-CM

## 2022-05-24 DIAGNOSIS — M10.9 GOUT OF MULTIPLE SITES, UNSPECIFIED CAUSE, UNSPECIFIED CHRONICITY: ICD-10-CM

## 2022-05-24 DIAGNOSIS — Z79.899 MEDICATION MANAGEMENT: ICD-10-CM

## 2022-05-24 LAB
ALBUMIN SERPL-MCNC: 3.9 G/DL (ref 3.5–5.3)
ALP SERPL-CCNC: 78 U/L (ref 45–115)
ALT SERPL-CCNC: 22 U/L (ref 7–52)
ANION GAP SERPL CALC-SCNC: 7 MMOL/L (ref 3–11)
AST SERPL-CCNC: 21 U/L
BILIRUB SERPL-MCNC: 0.55 MG/DL (ref 0.2–1.4)
BUN SERPL-MCNC: 36 MG/DL (ref 7–25)
CALCIUM ALBUM COR SERPL-MCNC: 8.9 MG/DL (ref 8.6–10.3)
CALCIUM SERPL-MCNC: 8.8 MG/DL (ref 8.6–10.3)
CHLORIDE SERPL-SCNC: 106 MMOL/L (ref 98–107)
CO2 SERPL-SCNC: 26 MMOL/L (ref 21–32)
CREAT SERPL-MCNC: 1.23 MG/DL (ref 0.7–1.3)
GFR SERPL CREATININE-BSD FRML MDRD: 66 ML/MIN/1.73M*2
GLUCOSE SERPL-MCNC: 110 MG/DL (ref 70–105)
POTASSIUM SERPL-SCNC: 5 MMOL/L (ref 3.5–5.1)
PROT SERPL-MCNC: 6.8 G/DL (ref 6–8.3)
SODIUM SERPL-SCNC: 139 MMOL/L (ref 135–145)
URATE SERPL-MCNC: 5 MG/DL (ref 4.4–7.6)

## 2022-05-24 PROCEDURE — 84550 ASSAY OF BLOOD/URIC ACID: CPT | Mod: PO

## 2022-05-24 PROCEDURE — 99215 OFFICE O/P EST HI 40 MIN: CPT | Performed by: INTERNAL MEDICINE

## 2022-05-24 PROCEDURE — 36415 COLL VENOUS BLD VENIPUNCTURE: CPT | Mod: PO

## 2022-05-24 PROCEDURE — G0463 HOSPITAL OUTPT CLINIC VISIT: HCPCS | Mod: PO | Performed by: INTERNAL MEDICINE

## 2022-05-24 PROCEDURE — 80053 COMPREHEN METABOLIC PANEL: CPT | Mod: PO

## 2022-05-24 RX ORDER — FEBUXOSTAT 80 MG/1
80 TABLET, FILM COATED ORAL DAILY
Qty: 90 TABLET | Refills: 0 | Status: SHIPPED | OUTPATIENT
Start: 2022-05-24 | End: 2022-08-18 | Stop reason: SDUPTHER

## 2022-05-24 RX ORDER — COLCHICINE 0.6 MG/1
0.6 TABLET ORAL EVERY OTHER DAY
Qty: 45 TABLET | Refills: 0 | Status: SHIPPED | OUTPATIENT
Start: 2022-05-24 | End: 2022-08-01 | Stop reason: SDUPTHER

## 2022-05-24 RX ORDER — PREDNISONE 20 MG/1
20 TABLET ORAL DAILY
COMMUNITY
Start: 2022-03-21 | End: 2023-02-09 | Stop reason: ALTCHOICE

## 2022-05-24 ASSESSMENT — PAIN SCALES - GENERAL: PAINLEVEL: 6

## 2022-05-24 NOTE — PROGRESS NOTES
CC:  Jc is a very pleasant 62-year-old male with chronic longstanding gout that has moderate disease activity and his last uric acid was 10.2.      We are going to optimize his gout by starting him on colchicine at a dose of 0.6 mg every other day.  He is going to be on the reduced dose for 30 days.  He is on a reduced dose due to the interaction of his beta-blocker causing increased levels of colchicine.  I did explain to him that the purpose of the colchicine was to treat any inflammation or acute gout attacks well we are reducing his uric acid level with Uloric.     He has no contraindications to Uloric.  He was going to start 40 mg p.o. daily for 2 weeks then he is go up to 80 mg p.o. daily.      Interim:  Since the last visit he has had a flare of inflammatory arthritis that is possibly gout.  Also on the differential is psoriatic arthritis due to his history of psoriasis.  He continues to tolerate Uloric at a dose of 80 mg daily.  His last uric acid was not below 6 but was 6.7.  He was seen for the flare and placed on prednisone and is currently on a prednisone taper.  He is able to take colchicine but only at a lower dose due to his beta-blocker.  Last time he was on colchicine he did not have any side effects.    We did discuss that it is still early in his gout therapy to decide whether the flare was from gout versus psoriatic arthritis.  He states pain over the last week was 6.5 out of 10.  The patient global assessment is 5.5 out of 10.  Over the last week he has had mild pain pain in his right hip, left finger and left knee.  Review of systems is positive for sinus problems.  He denies any side effects currently from the prednisone.    ROS:  See Interim    History:  Past Medical History:   Diagnosis Date   • Atrial fibrillation (CMS/HCC) (HCC)    • Gout    • Psoriasis      Past Surgical History:   Procedure Laterality Date   • CARDIOVERSION  03/2010   • COLONOSCOPY  01/01/1995   • ORTHOPEDIC SURGERY  "Bilateral     Knee Scope   • OTHER SURGICAL HISTORY Right     Orthopedic:Rotator cuff x2 Right shoulder     Social History     Socioeconomic History   • Marital status:      Spouse name: Not on file   • Number of children: Not on file   • Years of education: Not on file   • Highest education level: Not on file   Occupational History   • Not on file   Tobacco Use   • Smoking status: Former Smoker   • Smokeless tobacco: Former User   Substance and Sexual Activity   • Alcohol use: No   • Drug use: Not on file   • Sexual activity: Not on file   Other Topics Concern   • Not on file   Social History Narrative   • Not on file     Social Determinants of Health     Financial Resource Strain: Not on file   Food Insecurity: Not on file   Transportation Needs: Not on file   Physical Activity: Not on file   Stress: Not on file   Social Connections: Not on file   Intimate Partner Violence: Not on file   Housing Stability: Not on file       Objective:  Vitals:    05/24/22 0952   BP: 128/73   BP Location: Right arm   Patient Position: Sitting   Cuff Size: Long Adult   Pulse: 83   Resp: 16   Temp: 37 °C (98.6 °F)   TempSrc: Temporal   SpO2: 96%   Weight: (!) 142.9 kg (315 lb)   Height: 1.981 m (6' 6\")       Physical Exam    On today's exam his left second and third MCP continued to be swollen with swelling of the entire index finger with mild erythema.  This could resemble dactylitis but also gout.  I did not see any other evidence of synovitis on exam.    Allergies:  Allopurinol and Cephalexin    Medications:  Current Outpatient Medications on File Prior to Visit   Medication Sig Dispense Refill   • febuxostat (ULORIC) 80 mg tablet Take 1 tablet (80 mg total) by mouth daily 30 tablet 2   • CARVEDILOL ORAL Take by mouth Unknown mg     • ascorbic acid (VITAMIN C ORAL) Take by mouth     • colchicine 0.6 mg tablet Take 1 tablet (0.6 mg total) by mouth every other day 15 tablet 0   • lisinopril (PRINIVIL,ZESTRIL) 10 mg tablet " Take 1 tablet(s) every day by oral route. 30 2   • furosemide (LASIX) 20 mg tablet 2/day 180 3   • warfarin (COUMADIN) 5 mg tablet 4 mg Alternates 2 mg with 4 mg every other day. 50 0   • albuterol HFA (PROVENTIL HFA;VENTOLIN HFA) 90 mcg/actuation inhaler Inhale 2 puffs every 4 hours by inhalation route. 1 0     No current facility-administered medications on file prior to visit.         Labs:  CBC:     WBC   Date Value Ref Range Status   04/14/2022 7.1 3.7 - 9.6 10*3/uL Final     RBC   Date Value Ref Range Status   04/14/2022 4.29 4.10 - 5.80 10*6/µL Final     Hemoglobin   Date Value Ref Range Status   04/14/2022 13.2 13.2 - 17.2 g/dL Final     Hematocrit   Date Value Ref Range Status   04/14/2022 40.5 38.0 - 50.0 % Final     Platelets   Date Value Ref Range Status   04/14/2022 246 130 - 350 10*3/uL Final     CMP:   Sodium   Date Value Ref Range Status   04/14/2022 140 135 - 145 mmol/L Final     Potassium   Date Value Ref Range Status   04/14/2022 4.5 3.5 - 5.1 MMOL/L Final     Chloride   Date Value Ref Range Status   04/14/2022 107 98 - 107 mmol/L Final     CO2   Date Value Ref Range Status   04/14/2022 27 21 - 32 mmol/L Final     Glucose   Date Value Ref Range Status   04/14/2022 102 70 - 105 mg/dL Final     Creatinine   Date Value Ref Range Status   04/14/2022 1.31 (H) 0.70 - 1.30 mg/dL Final     Calcium   Date Value Ref Range Status   04/14/2022 9.0 8.6 - 10.3 mg/dL Final     Albumin   Date Value Ref Range Status   04/14/2022 3.7 3.5 - 5.3 g/dL Final     Alkaline Phosphatase   Date Value Ref Range Status   04/14/2022 79 45 - 115 U/L Final     Total Bilirubin   Date Value Ref Range Status   04/14/2022 0.77 0.20 - 1.40 mg/dL Final     ALT (SGPT)   Date Value Ref Range Status   04/14/2022 12 7 - 52 U/L Final     AST   Date Value Ref Range Status   04/14/2022 15 <40 U/L Final     BUN   Date Value Ref Range Status   04/14/2022 24 7 - 25 mg/dL Final     Anion Gap   Date Value Ref Range Status   04/14/2022 6 3 - 11  mmol/L Final     ESR/CRP:   Sed Rate   Date Value Ref Range Status   04/14/2022 13 <=20 mm/hr Final     CRP   Date Value Ref Range Status   04/14/2022 3.8 <=10.0 mg/L Final     Assessment/Plan:    Diagnoses and all orders for this visit:    Gout of multiple sites, unspecified cause, unspecified chronicity  -     Uric acid Blood, Venous; Future  -     Comprehensive metabolic panel Blood, Venous; Future  -     colchicine 0.6 mg tablet; Take 1 tablet (0.6 mg total) by mouth every other day  -     febuxostat (ULORIC) 80 mg tablet; Take 1 tablet (80 mg total) by mouth daily    Other specified counseling    Medication management      Jc is a very pleasant 62-year-old male with gout without tophi.  At the last visit we did start Uloric as a uric acid reducer.  He is currently taking 80 mg daily his last uric acid was not below 6 but was 6.7 a month ago.  We are going to recheck his uric acid today.  He is to continue taking Uloric 80 mg daily I am also going to place him on colchicine 0.6 mg every other day.  The ever other day dosing is due to the fact that allopurinol can increase colchicine levels in the blood.  He is to continue the prednisone taper as prescribed by his other physician.    I will see him back in approximately 3 months if he has any inflammatory arthritis flares at that point while on colchicine and Uloric he may certainly have psoriatic arthritis and I would recommend moving forward with either sulfasalazine if he does not have an allergy from that or methotrexate.      Discussed all in detail patient voiced understanding and is in agreement with plan.    A voice recognition program was used to aid in documentation of this record.  Sometimes words are not printed exactly as they were spoken.  While efforts were made to carefully edit and correct any inaccuracies, some errors may be present; please take these into context.  Please contact the provider if areas are identified.    On this date of  service 40 minutes of total time was spent in evaluation and management of this encounter.  On this date of service 40 minutes were spent in other reportable services.     Doris Yates MD    7:44 AM, 5/24/2022

## 2022-05-24 NOTE — PATIENT INSTRUCTIONS
Continue prednisone  Continue Uloric 80 mg daily  Start colchicine one pill every other day on odd days only  Labs today

## 2022-05-24 NOTE — LETTER
[facility] hospital RHEUMATOLOGY  [address] [facility] [address]  [phone]  Dept: [phone]  05/30/22      TONO Blank  [address] [facility] [place] [address]        To: TONO Blank      Thank you for referring your patient, Brando Sexton, to receive care in my office. I have enclosed a summary of the care provided to Brando on 05/30/22.    Please contact me with any questions you may have regarding the visit.    Sincerely,         Doris Yates MD  [address] [facility] [facility] [address]  [phone]    CC: No Recipients

## 2022-08-01 ENCOUNTER — TELEPHONE (OUTPATIENT)
Dept: RHEUMATOLOGY | Facility: CLINIC | Age: 62
End: 2022-08-01
Payer: MEDICARE

## 2022-08-01 DIAGNOSIS — M10.9 GOUT OF MULTIPLE SITES, UNSPECIFIED CAUSE, UNSPECIFIED CHRONICITY: ICD-10-CM

## 2022-08-01 RX ORDER — COLCHICINE 0.6 MG/1
0.6 TABLET ORAL EVERY OTHER DAY
Qty: 15 TABLET | Refills: 0 | Status: SHIPPED | OUTPATIENT
Start: 2022-08-01 | End: 2022-08-18 | Stop reason: SDUPTHER

## 2022-08-01 NOTE — TELEPHONE ENCOUNTER
Patient calls in wondering if he is supposed to continue the colchicine until he sees Dr. GENAO on 8/25, per her note it looks like she wanted him to take this until he sees her. He states he only has 7 pills left and will need a refill. Fill sent in to get him to his appt.

## 2022-08-18 ENCOUNTER — APPOINTMENT (OUTPATIENT)
Dept: LAB | Facility: CLINIC | Age: 62
End: 2022-08-18
Payer: MEDICARE

## 2022-08-18 ENCOUNTER — OFFICE VISIT (OUTPATIENT)
Dept: RHEUMATOLOGY | Facility: CLINIC | Age: 62
End: 2022-08-18
Payer: MEDICARE

## 2022-08-18 VITALS
HEART RATE: 81 BPM | DIASTOLIC BLOOD PRESSURE: 78 MMHG | WEIGHT: 312 LBS | SYSTOLIC BLOOD PRESSURE: 136 MMHG | OXYGEN SATURATION: 94 % | BODY MASS INDEX: 36.1 KG/M2 | HEIGHT: 78 IN | TEMPERATURE: 98.6 F | RESPIRATION RATE: 18 BRPM

## 2022-08-18 DIAGNOSIS — Z79.899 MEDICATION MANAGEMENT: ICD-10-CM

## 2022-08-18 DIAGNOSIS — Z71.89 OTHER SPECIFIED COUNSELING: ICD-10-CM

## 2022-08-18 DIAGNOSIS — M10.9 GOUT OF MULTIPLE SITES, UNSPECIFIED CAUSE, UNSPECIFIED CHRONICITY: Primary | ICD-10-CM

## 2022-08-18 DIAGNOSIS — M10.9 GOUT OF MULTIPLE SITES, UNSPECIFIED CAUSE, UNSPECIFIED CHRONICITY: ICD-10-CM

## 2022-08-18 LAB
ALBUMIN SERPL-MCNC: 4 G/DL (ref 3.5–5.3)
ALP SERPL-CCNC: 68 U/L (ref 45–115)
ALT SERPL-CCNC: 14 U/L (ref 7–52)
ANION GAP SERPL CALC-SCNC: 5 MMOL/L (ref 3–11)
AST SERPL-CCNC: 18 U/L
BASOPHILS # BLD AUTO: 0.1 10*3/UL
BASOPHILS NFR BLD AUTO: 1.3 % (ref 0–2)
BILIRUB SERPL-MCNC: 1.21 MG/DL (ref 0.2–1.4)
BUN SERPL-MCNC: 27 MG/DL (ref 7–25)
CALCIUM ALBUM COR SERPL-MCNC: 9 MG/DL (ref 8.6–10.3)
CALCIUM SERPL-MCNC: 9 MG/DL (ref 8.6–10.3)
CHLORIDE SERPL-SCNC: 106 MMOL/L (ref 98–107)
CK SERPL-CCNC: 105 U/L (ref 39–308)
CO2 SERPL-SCNC: 27 MMOL/L (ref 21–32)
CREAT SERPL-MCNC: 1.34 MG/DL (ref 0.7–1.3)
CRP SERPL-MCNC: 1.9 MG/L
EOSINOPHIL # BLD AUTO: 0.5 10*3/UL
EOSINOPHIL NFR BLD AUTO: 6.7 % (ref 0–3)
ERYTHROCYTE [DISTWIDTH] IN BLOOD BY AUTOMATED COUNT: 14.6 % (ref 11.5–15)
ERYTHROCYTE [SEDIMENTATION RATE] IN BLOOD: 5 MM/HR
GFR SERPL CREATININE-BSD FRML MDRD: 60 ML/MIN/1.73M*2
GLUCOSE SERPL-MCNC: 98 MG/DL (ref 70–105)
HCT VFR BLD AUTO: 42.8 % (ref 38–50)
HGB BLD-MCNC: 14.3 G/DL (ref 13.2–17.2)
LYMPHOCYTES # BLD AUTO: 1.5 10*3/UL
LYMPHOCYTES NFR BLD AUTO: 21.1 % (ref 15–47)
MCH RBC QN AUTO: 32 PG (ref 29–34)
MCHC RBC AUTO-ENTMCNC: 33.5 G/DL (ref 32–36)
MCV RBC AUTO: 95.5 FL (ref 82–97)
MONOCYTES # BLD AUTO: 0.6 10*3/UL
MONOCYTES NFR BLD AUTO: 7.8 % (ref 5–13)
NEUTROPHILS # BLD AUTO: 4.5 10*3/UL
NEUTROPHILS NFR BLD AUTO: 63.1 % (ref 46–70)
PLATELET # BLD AUTO: 224 10*3/UL (ref 130–350)
PMV BLD AUTO: 7.9 FL (ref 6.9–10.8)
POTASSIUM SERPL-SCNC: 4.4 MMOL/L (ref 3.5–5.1)
PROT SERPL-MCNC: 6.5 G/DL (ref 6–8.3)
RBC # BLD AUTO: 4.48 10*6/ΜL (ref 4.1–5.8)
SODIUM SERPL-SCNC: 138 MMOL/L (ref 135–145)
URATE SERPL-MCNC: 4.9 MG/DL (ref 4.4–7.6)
WBC # BLD AUTO: 7.2 10*3/UL (ref 3.7–9.6)

## 2022-08-18 PROCEDURE — G0463 HOSPITAL OUTPT CLINIC VISIT: HCPCS | Mod: PO | Performed by: INTERNAL MEDICINE

## 2022-08-18 PROCEDURE — 85025 COMPLETE CBC W/AUTO DIFF WBC: CPT | Mod: PO

## 2022-08-18 PROCEDURE — 86140 C-REACTIVE PROTEIN: CPT | Mod: PO

## 2022-08-18 PROCEDURE — 85652 RBC SED RATE AUTOMATED: CPT | Mod: PO

## 2022-08-18 PROCEDURE — 99214 OFFICE O/P EST MOD 30 MIN: CPT | Performed by: INTERNAL MEDICINE

## 2022-08-18 PROCEDURE — 84550 ASSAY OF BLOOD/URIC ACID: CPT | Mod: PO

## 2022-08-18 PROCEDURE — 82550 ASSAY OF CK (CPK): CPT

## 2022-08-18 PROCEDURE — 80053 COMPREHEN METABOLIC PANEL: CPT | Mod: PO

## 2022-08-18 PROCEDURE — 36415 COLL VENOUS BLD VENIPUNCTURE: CPT | Mod: PO

## 2022-08-18 RX ORDER — COLCHICINE 0.6 MG/1
0.6 TABLET ORAL EVERY OTHER DAY
Qty: 45 TABLET | Refills: 1 | Status: SHIPPED | OUTPATIENT
Start: 2022-08-18 | End: 2023-02-02 | Stop reason: SDUPTHER

## 2022-08-18 RX ORDER — FEBUXOSTAT 80 MG/1
80 TABLET, FILM COATED ORAL DAILY
Qty: 30 TABLET | Refills: 5 | Status: SHIPPED | OUTPATIENT
Start: 2022-08-18 | End: 2023-02-02 | Stop reason: SDUPTHER

## 2022-08-18 ASSESSMENT — PAIN SCALES - GENERAL: PAINLEVEL: 0-NO PAIN

## 2022-08-18 NOTE — PATIENT INSTRUCTIONS
Continue Uloric 80 mg daily  Continue colchicine 1 tablet daily  If you have worsening GI issues call me and we will stop colchicine

## 2022-08-18 NOTE — LETTER
Critical access hospital RHEUMATOLOGY  [address] [facility] [address]  [phone]  Dept: [phone]  08/28/22      TONO Blank  [address] [facility] [place] [address]        To: TONO Blank      Thank you for referring your patient, Brando Sexton, to receive care in my office. I have enclosed a summary of the care provided to Brando on 08/28/22.    Please contact me with any questions you may have regarding the visit.    Sincerely,         Doris Yates MD  [address] [facility] [place] [address]  [phone]    CC: No Recipients

## 2022-08-18 NOTE — PROGRESS NOTES
CC:  Chief Complaint   Patient presents with    Follow-up            Jc is a very pleasant 62-year-old male with gout without tophi.  At the last visit we did start Uloric as a uric acid reducer.  He is currently taking 80 mg daily his last uric acid was not below 6 but was 6.7 a month ago.  He is to continue taking Uloric 80 mg daily I am also going to place him on colchicine 0.6 mg every other day.  The ever other day dosing is due to the fact that allopurinol can increase colchicine levels in the blood.  He is to continue the prednisone taper as prescribed by his other physician.     I will see him back in approximately 3 months if he has any inflammatory arthritis flares at that point while on colchicine and Uloric he may certainly have psoriatic arthritis and I would recommend moving forward with either sulfasalazine if he does not have an allergy from that or methotrexate.    Interim:  Since the last visit Brando has been doing very well.  He continues to make progress in physical therapy and has not had a gout flare since starting his medications.  He states that the colchicine will cause gas but is nothing unmanageable.    He denies any rashes or dyspepsia from febuxostat or colchicine.    Morning stiffness in his joints is 30 minutes.  Pain over the last week was 4 out of 10.  He continues to wear braces on his knees and right ankle.  He describes mild pain in his bilateral fingers right hip right knee and left knee.  Review of systems is positive for tinnitus.    ROS:  See Interim    History:  Past Medical History:   Diagnosis Date    Atrial fibrillation (CMS/HCC) (HCC)     Gout     Psoriasis      Past Surgical History:   Procedure Laterality Date    CARDIOVERSION  03/2010    COLONOSCOPY  01/01/1995    ORTHOPEDIC SURGERY Bilateral     Knee Scope    OTHER SURGICAL HISTORY Right     Orthopedic:Rotator cuff x2 Right shoulder     Social History     Socioeconomic History    Marital status:      Spouse  "name: Not on file    Number of children: Not on file    Years of education: Not on file    Highest education level: Not on file   Occupational History    Not on file   Tobacco Use    Smoking status: Former    Smokeless tobacco: Former   Substance and Sexual Activity    Alcohol use: No    Drug use: Not on file    Sexual activity: Not on file   Other Topics Concern    Not on file   Social History Narrative    Not on file     Social Determinants of Health     Financial Resource Strain: Not on file   Food Insecurity: Not on file   Transportation Needs: Not on file   Physical Activity: Not on file   Stress: Not on file   Social Connections: Not on file   Intimate Partner Violence: Not on file   Housing Stability: Not on file       Objective:  Vitals:    08/18/22 1102 08/18/22 1105   BP: 136/78    BP Location: Left arm    Patient Position: Sitting    Pulse: 81    Resp: 18    Temp: 37 °C (98.6 °F)    TempSrc: Temporal    SpO2:  94%   Weight: (!) 141.5 kg (312 lb)    Height: 1.981 m (6' 6\")        Physical Exam    On today's exam I did not see any evidence of tophi or synovitis.  He was wearing a right ankle brace as well as a right knee brace.  He has bilateral Heberden's and Eyad nodes.    Allergies:  Allopurinol and Cephalexin    Medications:  Current Outpatient Medications on File Prior to Visit   Medication Sig Dispense Refill    colchicine 0.6 mg tablet Take 1 tablet (0.6 mg total) by mouth every other day 15 tablet 0    predniSONE (DELTASONE) 20 mg tablet Take 20 mg by mouth daily      febuxostat (ULORIC) 80 mg tablet Take 1 tablet (80 mg total) by mouth daily 90 tablet 0    CARVEDILOL ORAL Take by mouth Unknown mg      ascorbic acid (VITAMIN C ORAL) Take by mouth      lisinopril (PRINIVIL,ZESTRIL) 10 mg tablet Take 1 tablet(s) every day by oral route. 30 2    furosemide (LASIX) 20 mg tablet 2/day 180 3    warfarin (COUMADIN) 5 mg tablet 4 mg Alternates 2 mg with 4 mg every other day. 50 0    albuterol HFA " (PROVENTIL HFA;VENTOLIN HFA) 90 mcg/actuation inhaler Inhale 2 puffs every 4 hours by inhalation route. 1 0     No current facility-administered medications on file prior to visit.         Labs:  CBC:     WBC   Date Value Ref Range Status   04/14/2022 7.1 3.7 - 9.6 10*3/uL Final     RBC   Date Value Ref Range Status   04/14/2022 4.29 4.10 - 5.80 10*6/µL Final     Hemoglobin   Date Value Ref Range Status   04/14/2022 13.2 13.2 - 17.2 g/dL Final     Hematocrit   Date Value Ref Range Status   04/14/2022 40.5 38.0 - 50.0 % Final     Platelets   Date Value Ref Range Status   04/14/2022 246 130 - 350 10*3/uL Final     CMP:   Sodium   Date Value Ref Range Status   08/18/2022 138 135 - 145 mmol/L Final     Potassium   Date Value Ref Range Status   08/18/2022 4.4 3.5 - 5.1 MMOL/L Final     Chloride   Date Value Ref Range Status   08/18/2022 106 98 - 107 mmol/L Final     CO2   Date Value Ref Range Status   08/18/2022 27 21 - 32 mmol/L Final     Glucose   Date Value Ref Range Status   08/18/2022 98 70 - 105 mg/dL Final     Creatinine   Date Value Ref Range Status   08/18/2022 1.34 (H) 0.70 - 1.30 mg/dL Final     Calcium   Date Value Ref Range Status   08/18/2022 9.0 8.6 - 10.3 mg/dL Final     Albumin   Date Value Ref Range Status   08/18/2022 4.0 3.5 - 5.3 g/dL Final     Alkaline Phosphatase   Date Value Ref Range Status   08/18/2022 68 45 - 115 U/L Final     Total Bilirubin   Date Value Ref Range Status   08/18/2022 1.21 0.20 - 1.40 mg/dL Final     ALT (SGPT)   Date Value Ref Range Status   08/18/2022 14 7 - 52 U/L Final     AST   Date Value Ref Range Status   08/18/2022 18 <40 U/L Final     BUN   Date Value Ref Range Status   08/18/2022 27 (H) 7 - 25 mg/dL Final     Anion Gap   Date Value Ref Range Status   08/18/2022 5 3 - 11 mmol/L Final     ESR/CRP:   Sed Rate   Date Value Ref Range Status   04/14/2022 13 <=20 mm/hr Final     CRP   Date Value Ref Range Status   04/14/2022 3.8 <=10.0 mg/L Final     No results found for:  HEPAIGM, HEPBIGM, HEPCAB, HEPBSAB, HEPBSAG, TBGOLD    Assessment/Plan:    Brando was seen today for follow-up.    Diagnoses and all orders for this visit:    Gout of multiple sites, unspecified cause, unspecified chronicity  -     CBC w/auto differential Blood, Venous; Future  -     Comprehensive metabolic panel Blood, Venous; Future  -     C-reactive protein (Inflammation) Blood, Venous; Future  -     Sedimentation rate, automated Blood, Venous; Future  -     Uric acid Blood, Venous; Future  -     CK Blood, Venous; Future  -     colchicine 0.6 mg tablet; Take 1 tablet (0.6 mg total) by mouth every other day  -     febuxostat (ULORIC) 80 mg tablet; Take 1 tablet (80 mg total) by mouth daily    Medication management  -     CBC w/auto differential Blood, Venous; Future  -     Comprehensive metabolic panel Blood, Venous; Future  -     C-reactive protein (Inflammation) Blood, Venous; Future  -     Sedimentation rate, automated Blood, Venous; Future  -     Uric acid Blood, Venous; Future  -     CK Blood, Venous; Future    Other specified counseling    Brando is a very pleasant 62-year-old male with a history of gout that is low disease activity on febuxostat 80 mg p.o. daily and colchicine 0.6 mg QOD.  His like his last uric acid was 5.0.    I will continue colchicine at 0.6 mg every other day unless he has increased GI symptoms then we will discontinue that medication.  The colchicine is at every other day use due to its interaction with his beta-blocker which can increase the level of colchicine.  I will obtain a CPK today    I will obtain medication monitoring labs to look for blood dyscrasias as well as obtain another uric acid.  He is to continue the febuxostat 80 mg daily    I will see him back in 6 months labs are due at the next visit      Discussed all in detail patient voiced understanding and is in agreement with plan.    A voice recognition program was used to aid in documentation of this record.  Sometimes  words are not printed exactly as they were spoken.  While efforts were made to carefully edit and correct any inaccuracies, some errors may be present; please take these into context.  Please contact the provider if areas are identified.    On this date of service 30 minutes of total time was spent in evaluation and management of this encounter.  On this date of [id number] minutes were spent in other reportable services.     Doris Yates MD    7:51 AM, 8/18/2022

## 2022-08-29 ENCOUNTER — TELEPHONE (OUTPATIENT)
Dept: RHEUMATOLOGY | Facility: CLINIC | Age: 62
End: 2022-08-29
Payer: MEDICARE

## 2022-08-29 NOTE — TELEPHONE ENCOUNTER
Attempted to reach Brando at phone number [phone]. Unable to reach patient. I did leave a voicemail requesting he return a call back to the office.

## 2022-08-29 NOTE — TELEPHONE ENCOUNTER
----- Message from Doris Yates MD sent at 8/28/2022  8:20 PM MDT -----  Please let him know his uric acid was 4.9 which is great. Inflammation tests were normal. Keep meds the same

## 2022-08-30 NOTE — TELEPHONE ENCOUNTER
Jc returned a call back to the office requesting call back to phone number [phone]. I was able to reach patient back and advised him of Dr. Yates's result note/recommendations. He states understanding and agreement.

## 2022-09-30 ENCOUNTER — APPOINTMENT (OUTPATIENT)
Dept: LAB | Facility: CLINIC | Age: 62
End: 2022-09-30
Payer: MEDICARE

## 2022-09-30 DIAGNOSIS — Z79.01 CURRENT USE OF LONG TERM ANTICOAGULATION: ICD-10-CM

## 2022-09-30 LAB
INR BLD: 2.1
PROTHROMBIN TIME: 24.5 SECONDS (ref 9.4–12.5)

## 2022-09-30 PROCEDURE — 36415 COLL VENOUS BLD VENIPUNCTURE: CPT

## 2022-09-30 PROCEDURE — 85610 PROTHROMBIN TIME: CPT

## 2023-01-17 ENCOUNTER — OFFICE VISIT (OUTPATIENT)
Dept: URGENT CARE | Facility: CLINIC | Age: 63
End: 2023-01-17
Payer: MEDICARE

## 2023-01-17 VITALS
BODY MASS INDEX: 35.48 KG/M2 | OXYGEN SATURATION: 96 % | DIASTOLIC BLOOD PRESSURE: 71 MMHG | WEIGHT: 307 LBS | TEMPERATURE: 98.6 F | HEART RATE: 78 BPM | SYSTOLIC BLOOD PRESSURE: 114 MMHG | RESPIRATION RATE: 20 BRPM

## 2023-01-17 DIAGNOSIS — K52.9 GASTROENTERITIS: Primary | ICD-10-CM

## 2023-01-17 PROCEDURE — G0463 HOSPITAL OUTPT CLINIC VISIT: HCPCS | Performed by: NURSE PRACTITIONER

## 2023-01-17 PROCEDURE — 99203 OFFICE O/P NEW LOW 30 MIN: CPT | Performed by: NURSE PRACTITIONER

## 2023-01-17 ASSESSMENT — ENCOUNTER SYMPTOMS
NERVOUS/ANXIOUS: 0
MYALGIAS: 0
NAUSEA: 1
RHINORRHEA: 0
DIARRHEA: 1
EYE PAIN: 0
SINUS PAIN: 0
CONSTIPATION: 0
WEAKNESS: 0
FREQUENCY: 0
SINUS PRESSURE: 0
FEVER: 0
DIFFICULTY URINATING: 0
COUGH: 0
DIZZINESS: 0
PALPITATIONS: 0
SHORTNESS OF BREATH: 0
ABDOMINAL PAIN: 0
SORE THROAT: 0
CHILLS: 0
AGITATION: 0
VOMITING: 1
LIGHT-HEADEDNESS: 0
CHEST TIGHTNESS: 0
WHEEZING: 0
ARTHRALGIAS: 0
TROUBLE SWALLOWING: 0
HEADACHES: 0

## 2023-01-17 NOTE — PATIENT INSTRUCTIONS
It is very important to drink plenty of fluids.  Drink small amounts frequently.   You are advised to stay well hydrated.  Small sips on a regular basis help to avoid triggering nausea.  Easy to digest diet is recommended:  Bananas, rice, applesauce, toast, broth, crackers.  Avoid greasy and spicy foods.  Avoid dairy and fresh fruits and vegetables (except bananas).  You can gradually increase diet as tolerated.

## 2023-01-17 NOTE — PROGRESS NOTES
Subjective      Brando Sexton is a 62 y.o. male who presents for diarrhea and vomiting.    Patient presents today with complaints of diarrhea and vomiting over the last 2 days.  He states that they have resolved and he was able to keep food down yesterday.  Also stomach is feeling better.  He reports that he is drinking fluids well and he has not had any diarrhea since yesterday.  He reported normal bowel movement yesterday at least once.  No further vomiting noted either.      The following have been reviewed and updated as appropriate in this visit:   Tobacco  Allergies  Meds  Problems  Med Hx  Surg Hx  Fam Hx         Allergies   Allergen Reactions    Allopurinol     Cephalexin      Current Outpatient Medications   Medication Sig Dispense Refill    colchicine 0.6 mg tablet Take 1 tablet (0.6 mg total) by mouth every other day 45 tablet 1    febuxostat (ULORIC) 80 mg tablet Take 1 tablet (80 mg total) by mouth daily 30 tablet 5    CARVEDILOL ORAL Take by mouth Unknown mg      lisinopril (PRINIVIL,ZESTRIL) 10 mg tablet Take 1 tablet(s) every day by oral route. 30 2    furosemide (LASIX) 20 mg tablet 2/day 180 3    warfarin (COUMADIN) 5 mg tablet 4 mg Alternates 2 mg with 4 mg every other day. 50 0    predniSONE (DELTASONE) 20 mg tablet Take 20 mg by mouth daily      ascorbic acid (VITAMIN C ORAL) Take by mouth      albuterol HFA (PROVENTIL HFA;VENTOLIN HFA) 90 mcg/actuation inhaler Inhale 2 puffs every 4 hours by inhalation route. 1 0     No current facility-administered medications for this visit.     Past Medical History:   Diagnosis Date    Atrial fibrillation (CMS/HCC) (HCC)     Gout     Psoriasis      Past Surgical History:   Procedure Laterality Date    CARDIOVERSION  03/2010    COLONOSCOPY  01/01/1995    ORTHOPEDIC SURGERY Bilateral     Knee Scope    OTHER SURGICAL HISTORY Right     Orthopedic:Rotator cuff x2 Right shoulder     Family History   Problem Relation Age of Onset    Heart disease  Mother     Colon cancer Mother     Heart disease Father         MI at age 87    Other Father         Crohns    Psoriasis Father     Asthma Father     Diabetes type II Father's Sister     Heart disease Father's Brother      Social History     Socioeconomic History    Marital status:    Tobacco Use    Smoking status: Former    Smokeless tobacco: Former   Substance and Sexual Activity    Alcohol use: No     Social Determinants of Health     Tobacco Use: Medium Risk    Smoking Tobacco Use: Former    Smokeless Tobacco Use: Former       Review of Systems   Constitutional:  Negative for chills and fever.   HENT:  Negative for congestion, ear pain, rhinorrhea, sinus pressure, sinus pain, sore throat and trouble swallowing.    Eyes:  Negative for pain and visual disturbance.   Respiratory:  Negative for cough, chest tightness, shortness of breath and wheezing.    Cardiovascular:  Negative for chest pain, palpitations and leg swelling.   Gastrointestinal:  Positive for diarrhea, nausea and vomiting. Negative for abdominal pain and constipation.        All GI symptoms have since resolved.   Endocrine: Negative for cold intolerance and heat intolerance.   Genitourinary:  Negative for difficulty urinating, frequency and urgency.   Musculoskeletal:  Negative for arthralgias and myalgias.   Skin:  Negative for rash.   Neurological:  Negative for dizziness, weakness, light-headedness and headaches.   Psychiatric/Behavioral:  Negative for agitation. The patient is not nervous/anxious.      Objective   /71 (BP Location: Left arm, Patient Position: Sitting, Cuff Size: Large Adult)   Pulse 78   Temp 37 °C (98.6 °F) (Temporal)   Resp 20   Wt (!) 139.3 kg (307 lb)   SpO2 96%   BMI 35.48 kg/m²     Physical Exam  Vitals and nursing note reviewed.   Constitutional:       Appearance: Normal appearance. He is normal weight.   HENT:      Head: Normocephalic and atraumatic.      Right Ear: External ear normal.      Left Ear:  External ear normal.      Nose: Nose normal.      Mouth/Throat:      Mouth: Mucous membranes are moist.   Eyes:      Extraocular Movements: Extraocular movements intact.      Pupils: Pupils are equal, round, and reactive to light.   Cardiovascular:      Rate and Rhythm: Normal rate. Rhythm irregular.      Pulses: Normal pulses.      Heart sounds: Normal heart sounds.      Comments: History of atrial fibrillation   Pulmonary:      Effort: Pulmonary effort is normal.      Breath sounds: Normal breath sounds.   Abdominal:      General: Abdomen is flat. Bowel sounds are normal.      Palpations: Abdomen is soft.   Musculoskeletal:         General: Normal range of motion.      Cervical back: Normal range of motion and neck supple.   Lymphadenopathy:      Cervical: No cervical adenopathy.   Skin:     General: Skin is warm and dry.      Capillary Refill: Capillary refill takes less than 2 seconds.   Neurological:      General: No focal deficit present.      Mental Status: He is alert and oriented to person, place, and time.   Psychiatric:         Mood and Affect: Mood normal.         Behavior: Behavior normal.       No results found for this or any previous visit (from the past 24 hour(s)).    Assessment/Plan   Diagnoses and all orders for this visit:    Gastroenteritis    Discussed with patient that he likely had a gastroenteritis however symptoms have since resolved.  He should continue with hydration and low residual diet slowly adding back foods as his stomach continues to improve.  Follow-up with primary care provider as needed.    Patient Instructions   It is very important to drink plenty of fluids.  Drink small amounts frequently.   You are advised to stay well hydrated.  Small sips on a regular basis help to avoid triggering nausea.  Easy to digest diet is recommended:  Bananas, rice, applesauce, toast, broth, crackers.  Avoid greasy and spicy foods.  Avoid dairy and fresh fruits and vegetables (except bananas).   You can gradually increase diet as tolerated.      Josefina Claudio, CNP

## 2023-02-02 ENCOUNTER — OFFICE VISIT (OUTPATIENT)
Dept: RHEUMATOLOGY | Facility: CLINIC | Age: 63
End: 2023-02-02
Payer: MEDICARE

## 2023-02-02 VITALS
WEIGHT: 314 LBS | DIASTOLIC BLOOD PRESSURE: 51 MMHG | BODY MASS INDEX: 36.29 KG/M2 | HEART RATE: 84 BPM | OXYGEN SATURATION: 96 % | SYSTOLIC BLOOD PRESSURE: 122 MMHG

## 2023-02-02 DIAGNOSIS — Z79.899 MEDICATION MANAGEMENT: ICD-10-CM

## 2023-02-02 DIAGNOSIS — M10.9 GOUT OF MULTIPLE SITES, UNSPECIFIED CAUSE, UNSPECIFIED CHRONICITY: Primary | ICD-10-CM

## 2023-02-02 DIAGNOSIS — Z71.89 OTHER SPECIFIED COUNSELING: ICD-10-CM

## 2023-02-02 PROCEDURE — 99215 OFFICE O/P EST HI 40 MIN: CPT | Performed by: INTERNAL MEDICINE

## 2023-02-02 PROCEDURE — G0463 HOSPITAL OUTPT CLINIC VISIT: HCPCS | Mod: PO | Performed by: INTERNAL MEDICINE

## 2023-02-02 RX ORDER — COLCHICINE 0.6 MG/1
0.6 TABLET ORAL EVERY OTHER DAY
Qty: 45 TABLET | Refills: 1 | Status: SHIPPED | OUTPATIENT
Start: 2023-02-02 | End: 2023-02-09 | Stop reason: SDUPTHER

## 2023-02-02 RX ORDER — FEBUXOSTAT 80 MG/1
80 TABLET, FILM COATED ORAL DAILY
Qty: 90 TABLET | Refills: 1 | Status: SHIPPED | OUTPATIENT
Start: 2023-02-02 | End: 2023-02-09 | Stop reason: SDUPTHER

## 2023-02-02 ASSESSMENT — PAIN SCALES - GENERAL: PAINLEVEL: 4

## 2023-02-02 NOTE — PROGRESS NOTES
I would like your consent to use a new technology to help document today’s visit. The technology is called Dragon Ambient eXperience or KACI for short. The KACI technology will securely listen to your visit and convert what it hears into an office visit note. Would you be comfortable using KACI during your visit today?      Patient's response: Yes    CC:  Chief Complaint   Patient presents with    Follow-up     HPI:  Brando is a very pleasant 62-year-old male with a history of gout that is low disease activity on febuxostat 80 mg p.o. daily and colchicine 0.6 mg QOD.  His like his last uric acid was 5.0.    I will continue colchicine at 0.6 mg every other day unless he has increased GI symptoms then we will discontinue that medication.  The colchicine is at every other day use due to its interaction with his beta-blocker which can increase the level of colchicine.  I will obtain a CPK today    I will obtain medication monitoring labs to look for blood dyscrasias as well as obtain another uric acid.  He is to continue the febuxostat 80 mg daily    I will see him back in 6 months labs are due at the next visit    Interim:  He believes the colchicine is helping overall. He also takes febuxostat to lower his uric acid level to prevent gout flare ups. He states doing well with his diet. He mentions having steak tips and beef occasionally. He also states having tuna and other white fish.   He reports going to the chiropractor at least once a month and they gave him exercises to do at home. He reports stretching his foot by rolling a ball back and forth on his plantar side. He reports having a foam roller at home as well that he uses for his bilateral lower extremities, hamstrings, quadriceps, and glutes.  He denies having pain in his feet today, but believes he pulled a muscle in bed and has some discomfort in his lower extremity. He reports using [employer] compression stockings for his varicose veins.    His last uric  acid he had for me was in 08/2022. The patient has A-fib and history of dysentery. He takes furosemide. Prior to taking furosemide, he was on spironolactone but it caused him to have elevated potassium levels.    Morning stiffness is an hour. Joint pain over the last week was 3.5 out of 10. The patient global assessment is 2 out of 10. He describes mild pain in his bilateral fingers, right hip, right knee. Review of systems is positive for tinnitus.        ROS:  See Interim for details.      History:  Past Medical History:   Diagnosis Date    Atrial fibrillation (CMS/HCC) (HCC)     Gout     Psoriasis      Past Surgical History:   Procedure Laterality Date    CARDIOVERSION  03/2010    COLONOSCOPY  01/01/1995    ORTHOPEDIC SURGERY Bilateral     Knee Scope    OTHER SURGICAL HISTORY Right     Orthopedic:Rotator cuff x2 Right shoulder     Social History     Socioeconomic History    Marital status:      Spouse name: Not on file    Number of children: Not on file    Years of education: Not on file    Highest education level: Not on file   Occupational History    Not on file   Tobacco Use    Smoking status: Former    Smokeless tobacco: Former   Substance and Sexual Activity    Alcohol use: No    Drug use: Not on file    Sexual activity: Not on file   Other Topics Concern    Not on file   Social History Narrative    Not on file     Social Determinants of Health     Financial Resource Strain: Not on file   Food Insecurity: Not on file   Transportation Needs: Not on file   Physical Activity: Not on file   Stress: Not on file   Social Connections: Not on file   Intimate Partner Violence: Not on file   Housing Stability: Not on file       Objective:  Vitals:    02/02/23 1123 02/02/23 1131   BP: 122/51    BP Location: Right arm    Patient Position: Sitting    Pulse: 84    SpO2:  96%   Weight: (!) 142.4 kg (314 lb)        All joints were T0S0.  I did not see any gouty tophi.  He does have bilateral Heberden's and Eyad's  nodes.  No tenderness to lower extremities on exam today.  Noted varicose veins on the patient's lower extremities.  Lungs are clear.  Cardiac exam shows irregular rate and irregular ryhthm, he continues to be in atrial fibrillation with a regulated rate.      Allergies:  Allopurinol and Cephalexin    Medications:  Current Outpatient Medications on File Prior to Visit   Medication Sig Dispense Refill    CARVEDILOL ORAL Take by mouth Unknown mg      ascorbic acid (VITAMIN C ORAL) Take by mouth      lisinopril (PRINIVIL,ZESTRIL) 10 mg tablet Take 1 tablet(s) every day by oral route. 30 2    furosemide (LASIX) 20 mg tablet 2/day 180 3    warfarin (COUMADIN) 5 mg tablet 4 mg Alternates 2 mg with 4 mg every other day. 50 0    albuterol HFA (PROVENTIL HFA;VENTOLIN HFA) 90 mcg/actuation inhaler Inhale 2 puffs every 4 hours by inhalation route. 1 0     No current facility-administered medications on file prior to visit.         Labs:  CBC:     WBC   Date Value Ref Range Status   08/18/2022 7.2 3.7 - 9.6 10*3/uL Final     RBC   Date Value Ref Range Status   08/18/2022 4.48 4.10 - 5.80 10*6/µL Final     Hemoglobin   Date Value Ref Range Status   08/18/2022 14.3 13.2 - 17.2 g/dL Final     Hematocrit   Date Value Ref Range Status   08/18/2022 42.8 38.0 - 50.0 % Final     Platelets   Date Value Ref Range Status   08/18/2022 224 130 - 350 10*3/uL Final     CMP:   Sodium   Date Value Ref Range Status   08/18/2022 138 135 - 145 mmol/L Final     Potassium   Date Value Ref Range Status   08/18/2022 4.4 3.5 - 5.1 MMOL/L Final     Chloride   Date Value Ref Range Status   08/18/2022 106 98 - 107 mmol/L Final     CO2   Date Value Ref Range Status   08/18/2022 27 21 - 32 mmol/L Final     Glucose   Date Value Ref Range Status   08/18/2022 98 70 - 105 mg/dL Final     Creatinine   Date Value Ref Range Status   08/18/2022 1.34 (H) 0.70 - 1.30 mg/dL Final     Calcium   Date Value Ref Range Status   08/18/2022 9.0 8.6 - 10.3 mg/dL Final      Albumin   Date Value Ref Range Status   08/18/2022 4.0 3.5 - 5.3 g/dL Final     Alkaline Phosphatase   Date Value Ref Range Status   08/18/2022 68 45 - 115 U/L Final     Total Bilirubin   Date Value Ref Range Status   08/18/2022 1.21 0.20 - 1.40 mg/dL Final     ALT (SGPT)   Date Value Ref Range Status   08/18/2022 14 7 - 52 U/L Final     AST   Date Value Ref Range Status   08/18/2022 18 <40 U/L Final     BUN   Date Value Ref Range Status   08/18/2022 27 (H) 7 - 25 mg/dL Final     Anion Gap   Date Value Ref Range Status   08/18/2022 5 3 - 11 mmol/L Final     ESR/CRP:   Sed Rate   Date Value Ref Range Status   08/18/2022 5 <=20 mm/hr Final     CRP   Date Value Ref Range Status   08/18/2022 1.9 <=10.0 mg/L Final     On 08/18/2022, his uric acid was 4.9.  CPK was 105.  Sedimentation rate and CRP were within normal limits.  Creatinine was 1.34.      Assessment/Plan:    Brando was seen today for follow-up.    Diagnoses and all orders for this visit:    Gout of multiple sites, unspecified cause, unspecified chronicity  -     Comprehensive metabolic panel Blood, Venous; Future  -     C-reactive protein (Inflammation) Blood, Venous; Future  -     Uric acid Blood, Venous; Future  -     CBC w/auto differential Blood, Venous; Future  -     colchicine 0.6 mg tablet; Take 1 tablet (0.6 mg total) by mouth every other day  -     febuxostat (ULORIC) 80 mg tablet; Take 1 tablet (80 mg total) by mouth daily    Medication management    Other specified counseling      Mr. Sexton is a very pleasant 63-year-old male with chronic gout that is in remission on febuxostat 80 mg by mouth daily and colchicine 0.6 mg every other day.    His gout is in remission; he is currently taking febuxostat 80mg daily and he will continue this. He will continue to take colchicine 1 tablet every other day. I suggested at the next visit in 6 months if he is still doing this well, I will discontinue the colchicine for him as he probably will no longer  need it at that time. He will get labs today to include an updated uric acid, liver, and kidney blood counts. I will see him back in 6 months.       Discussed all in detail patient voiced understanding and is in agreement with plan.    On this date of service 40 minutes of total time was spent in evaluation and management of this encounter.  On this date of service 40 minutes were spent in other reportable services.     Doris Yates MD      7:53 PM, 2/1/2023    Draft generated by Katy CLEMONS and edited by Mariah Barahona, Quality  on 02/02/2023.

## 2023-02-02 NOTE — PATIENT INSTRUCTIONS
Your gout is in remission currently taking febuxostat 80 mg daily you will continue this    You will continue to take colchicine 1 tablet every other day and at the next visit in 6 months if you are still doing this well we will discontinue that medication as you probably do not need it at that time    We will get labs today to include an updated uric acid liver and kidney and blood counts and we will see you back in 6 months

## 2023-02-02 NOTE — LETTER
[facility]  [address] [address]  [phone]  Dept: [phone]  02/09/23      TONO Blank  [address] [place] [address]        To: TONO Blank      Thank you for referring your patient, Brando Sexton, to receive care in my office. I have enclosed a summary of the care provided to Brando on 02/09/23.    Please contact me with any questions you may have regarding the visit.    Sincerely,         Doris Yates MD  [address] [facility] [place] [address]  [phone]    CC: No Recipients

## 2023-02-03 ENCOUNTER — LAB (OUTPATIENT)
Dept: LAB | Facility: CLINIC | Age: 63
End: 2023-02-03
Payer: MEDICARE

## 2023-02-03 DIAGNOSIS — M10.9 GOUT OF MULTIPLE SITES, UNSPECIFIED CAUSE, UNSPECIFIED CHRONICITY: ICD-10-CM

## 2023-02-03 LAB
ALBUMIN SERPL-MCNC: 4 G/DL (ref 3.5–5.3)
ALP SERPL-CCNC: 69 U/L (ref 45–115)
ALT SERPL-CCNC: 18 U/L (ref 7–52)
ANION GAP SERPL CALC-SCNC: 6 MMOL/L (ref 3–11)
AST SERPL-CCNC: 15 U/L
BASOPHILS # BLD AUTO: 0.1 10*3/UL
BASOPHILS NFR BLD AUTO: 0.8 % (ref 0–2)
BILIRUB SERPL-MCNC: 1.25 MG/DL (ref 0.2–1.4)
BUN SERPL-MCNC: 33 MG/DL (ref 7–25)
CALCIUM ALBUM COR SERPL-MCNC: 9 MG/DL (ref 8.6–10.3)
CALCIUM SERPL-MCNC: 9 MG/DL (ref 8.6–10.3)
CHLORIDE SERPL-SCNC: 108 MMOL/L (ref 98–107)
CO2 SERPL-SCNC: 28 MMOL/L (ref 21–32)
CREAT SERPL-MCNC: 1.29 MG/DL (ref 0.7–1.3)
CRP SERPL-MCNC: <1 MG/L
EOSINOPHIL # BLD AUTO: 0.1 10*3/UL
EOSINOPHIL NFR BLD AUTO: 1.7 % (ref 0–3)
ERYTHROCYTE [DISTWIDTH] IN BLOOD BY AUTOMATED COUNT: 13.6 % (ref 11.5–15)
GFR SERPL CREATININE-BSD FRML MDRD: 62 ML/MIN/1.73M*2
GLUCOSE SERPL-MCNC: 109 MG/DL (ref 70–105)
HCT VFR BLD AUTO: 43.9 % (ref 38–50)
HGB BLD-MCNC: 14.6 G/DL (ref 13.2–17.2)
LYMPHOCYTES # BLD AUTO: 1.6 10*3/UL
LYMPHOCYTES NFR BLD AUTO: 23.5 % (ref 15–47)
MCH RBC QN AUTO: 32 PG (ref 29–34)
MCHC RBC AUTO-ENTMCNC: 33.1 G/DL (ref 32–36)
MCV RBC AUTO: 96.7 FL (ref 82–97)
MONOCYTES # BLD AUTO: 0.5 10*3/UL
MONOCYTES NFR BLD AUTO: 7.5 % (ref 5–13)
NEUTROPHILS # BLD AUTO: 4.5 10*3/UL
NEUTROPHILS NFR BLD AUTO: 66.5 % (ref 46–70)
PLATELET # BLD AUTO: 208 10*3/UL (ref 130–350)
PMV BLD AUTO: 8.5 FL (ref 6.9–10.8)
POTASSIUM SERPL-SCNC: 4.4 MMOL/L (ref 3.5–5.1)
PROT SERPL-MCNC: 6.1 G/DL (ref 6–8.3)
RBC # BLD AUTO: 4.54 10*6/ΜL (ref 4.1–5.8)
SODIUM SERPL-SCNC: 142 MMOL/L (ref 135–145)
URATE SERPL-MCNC: 4.9 MG/DL (ref 4.4–7.6)
WBC # BLD AUTO: 6.8 10*3/UL (ref 3.7–9.6)

## 2023-02-03 PROCEDURE — 80053 COMPREHEN METABOLIC PANEL: CPT

## 2023-02-03 PROCEDURE — 84550 ASSAY OF BLOOD/URIC ACID: CPT

## 2023-02-03 PROCEDURE — 86140 C-REACTIVE PROTEIN: CPT

## 2023-02-03 PROCEDURE — 36415 COLL VENOUS BLD VENIPUNCTURE: CPT

## 2023-02-03 PROCEDURE — 85025 COMPLETE CBC W/AUTO DIFF WBC: CPT

## 2023-02-08 ENCOUNTER — TELEPHONE (OUTPATIENT)
Dept: RHEUMATOLOGY | Facility: CLINIC | Age: 63
End: 2023-02-08
Payer: MEDICARE

## 2023-02-08 NOTE — TELEPHONE ENCOUNTER
Returned call for labs, tried nurse, got vm    Best time to reach you: anytime    Is it ok to leave a detailed voicemail:yes     Is it ok to message you through [email or web address]:  no

## 2023-02-08 NOTE — TELEPHONE ENCOUNTER
Attempted to reach Brando at phone number [phone]. Number rang twice then was forwarded to voicemail. Left a voicemail letting him know I was returning his call. Left clinic phone number [phone] to return a call back to.

## 2023-02-09 RX ORDER — COLCHICINE 0.6 MG/1
0.6 TABLET ORAL EVERY OTHER DAY
Qty: 45 TABLET | Refills: 1 | Status: SHIPPED | OUTPATIENT
Start: 2023-02-09 | End: 2023-12-09 | Stop reason: ALTCHOICE

## 2023-02-09 RX ORDER — FEBUXOSTAT 80 MG/1
80 TABLET, FILM COATED ORAL DAILY
Qty: 90 TABLET | Refills: 1 | Status: SHIPPED | OUTPATIENT
Start: 2023-02-09 | End: 2023-08-15 | Stop reason: SDUPTHER

## 2023-02-09 NOTE — TELEPHONE ENCOUNTER
Returned call for lab results, tried nurse, got vm    Best time to reach you: any    Is it ok to leave a detailed voicemail:yes     Is it ok to message you through [employer]:  no

## 2023-02-10 NOTE — TELEPHONE ENCOUNTER
I was able to reach Brando at phone number [phone], patient questions what his last uric acid level was. Advised patient. He states understanding, no further questions at this time.

## 2023-06-02 ENCOUNTER — HOSPITAL ENCOUNTER (OUTPATIENT)
Facility: HOSPITAL | Age: 63
Setting detail: OUTPATIENT SURGERY
End: 2023-06-02
Attending: SURGERY | Admitting: SURGERY
Payer: MEDICARE

## 2023-06-02 DIAGNOSIS — Z12.11 SCREENING FOR MALIGNANT NEOPLASM OF COLON: Primary | ICD-10-CM

## 2023-06-13 RX ORDER — SODIUM CHLORIDE, SODIUM LACTATE, POTASSIUM CHLORIDE, CALCIUM CHLORIDE 600; 310; 30; 20 MG/100ML; MG/100ML; MG/100ML; MG/100ML
50 INJECTION, SOLUTION INTRAVENOUS CONTINUOUS
Status: CANCELLED | OUTPATIENT
Start: 2023-06-13

## 2023-08-13 NOTE — PROGRESS NOTES
I would like your consent to use a new technology to help document today’s visit. The technology is called Dragon Ambient eXperience or KACI for short. The KACI technology will securely listen to your visit and convert what it hears into an office visit note. Would you be comfortable using KACI during your visit today?      Patient's response: Yes    CC:  Chief Complaint   Patient presents with    Gout of multiple sites, unspecified cause, unspecified      No new issues or concerns- Feet are feeling fantastic        HPI:  Mr. Sexton is a very pleasant 63-year-old male with chronic gout that is in remission on febuxostat 80 mg by mouth daily and colchicine 0.6 mg every other day.     His gout is in remission; he is currently taking febuxostat 80mg daily and he will continue this. He will continue to take colchicine 1 tablet every other day. I suggested at the next visit in 6 months if he is still doing this well, I will discontinue the colchicine for him as he probably will no longer need it at that time. He will get labs today to include an updated uric acid, liver, and kidney blood counts. I will see him back in 6 months.     Interim:     Brando presents today for a follow-up visit. .    Brando has been getting cortisone injections in his knees, which is beneficial for him. He usually takes vitamin C at night; however, sometimes he forgets to take it. Since he has been more attentive to it, the pain in his knees has improved. Brando was told by orthopedics that he had extra cartilage in his right knee, which should not affect his knee. He has had 2 arthroscopies on each knee. He does not take Voltaren daily so as not to lessen its effectiveness. Yesterday, he took a dose of it, but not today. Brando applies Voltaren gel to his knees, and it offers him relief. He can walk the entire day without it. He was told that he needed to wait until his surgery was ready. He was advised to lose some weight. He sees [facility].    He denies any gout flares since his last visit. The Uloric was keeping the pain away, and colchicine might aggravate his abdominal issues. He has had IBS for quite a while. He is experiencing bloating and diarrhea. His bowel elimination has been intermittently consistent.     Brando has been eating a lot of chicken, red meat, fish, and chicken. He had a beef burrito last night. He will have red meat once or twice a week. He refrained from eating shellfish. Currently, he has a well-balanced diet.    He was told that he should have done a sigmoidoscopy; however, he did not want the lower GI.        ROS:  See Interim for details    History:  Past Medical History:   Diagnosis Date    Atrial fibrillation (CMS/HCC)     Gout     Psoriasis      Past Surgical History:   Procedure Laterality Date    CARDIOVERSION  03/2010    COLONOSCOPY  01/01/1995    ORTHOPEDIC SURGERY Bilateral     Knee Scope    OTHER SURGICAL HISTORY Right     Orthopedic:Rotator cuff x2 Right shoulder     Social History     Socioeconomic History    Marital status:      Spouse name: Not on file    Number of children: Not on file    Years of education: Not on file    Highest education level: Not on file   Occupational History    Not on file   Tobacco Use    Smoking status: Former    Smokeless tobacco: Former   Substance and Sexual Activity    Alcohol use: No    Drug use: Not on file    Sexual activity: Not on file   Other Topics Concern    Not on file   Social History Narrative    Not on file     Social Determinants of Health     Financial Resource Strain: Not on file   Food Insecurity: Not on file   Transportation Needs: Not on file   Physical Activity: Not on file   Stress: Not on file   Social Connections: Not on file   Intimate Partner Violence: Not on file   Housing Stability: Not on file       Objective:  Vitals:    08/15/23 1050 08/15/23 1052   BP: 111/77    BP Location: Right arm    Patient Position: Sitting   "  Pulse: 86    Resp: 17    SpO2:  94%   Weight: (!) 320 lb    Height: 6' 6\"        No tophi or active signs of gout.     Allergies:  Allopurinol and Cephalexin    Medications:  Current Outpatient Medications on File Prior to Visit   Medication Sig Dispense Refill    colchicine 0.6 mg tablet Take 1 tablet (0.6 mg total) by mouth every other day 45 tablet 1    CARVEDILOL ORAL Take by mouth Unknown mg      ascorbic acid (VITAMIN C ORAL) Take by mouth      lisinopril (PRINIVIL,ZESTRIL) 10 mg tablet Take 1 tablet(s) every day by oral route. 30 2    furosemide (LASIX) 20 mg tablet 2/day 180 3    warfarin (COUMADIN) 5 mg tablet 4 mg Alternates 2 mg with 4 mg every other day. 50 0    albuterol HFA (PROVENTIL HFA;VENTOLIN HFA) 90 mcg/actuation inhaler Inhale 2 puffs every 4 hours by inhalation route. 1 0     No current facility-administered medications on file prior to visit.         Labs:  CBC:   Results from last 4 days   Lab Units 08/15/23  1226   WBC AUTO 10*3/uL 7.0   HEMOGLOBIN g/dL 15.2   HEMATOCRIT % 44.8   PLATELETS AUTO 10*3/uL 190   NEUTROS PCT AUTO % 70.1*   LYMPHS PCT AUTO % 19.1   MONOS PCT AUTO % 8.3   EOS PCT AUTO % 1.7     WBC   Date Value Ref Range Status   08/15/2023 7.0 3.7 - 9.6 10*3/uL Final     RBC   Date Value Ref Range Status   08/15/2023 4.64 4.10 - 5.80 10*6/µL Final     Hemoglobin   Date Value Ref Range Status   08/15/2023 15.2 13.2 - 17.2 g/dL Final     Hematocrit   Date Value Ref Range Status   08/15/2023 44.8 38.0 - 50.0 % Final     Platelets   Date Value Ref Range Status   08/15/2023 190 130 - 350 10*3/uL Final     CMP:   Sodium   Date Value Ref Range Status   02/03/2023 142 135 - 145 mmol/L Final     Potassium   Date Value Ref Range Status   02/03/2023 4.4 3.5 - 5.1 MMOL/L Final     Chloride   Date Value Ref Range Status   02/03/2023 108 (H) 98 - 107 mmol/L Final     CO2   Date Value Ref Range Status   02/03/2023 28 21 - 32 mmol/L Final     Glucose   Date Value Ref Range Status   02/03/2023 " 109 (H) 70 - 105 mg/dL Final     Creatinine   Date Value Ref Range Status   02/03/2023 1.29 0.70 - 1.30 mg/dL Final     Calcium   Date Value Ref Range Status   02/03/2023 9.0 8.6 - 10.3 mg/dL Final     Albumin   Date Value Ref Range Status   02/03/2023 4.0 3.5 - 5.3 g/dL Final     Alkaline Phosphatase   Date Value Ref Range Status   02/03/2023 69 45 - 115 U/L Final     Total Bilirubin   Date Value Ref Range Status   02/03/2023 1.25 0.20 - 1.40 mg/dL Final     ALT (SGPT)   Date Value Ref Range Status   02/03/2023 18 7 - 52 U/L Final     AST   Date Value Ref Range Status   02/03/2023 15 <40 U/L Final     BUN   Date Value Ref Range Status   02/03/2023 33 (H) 7 - 25 mg/dL Final     Anion Gap   Date Value Ref Range Status   02/03/2023 6 3 - 11 mmol/L Final     ESR/CRP:   Sed Rate   Date Value Ref Range Status   08/18/2022 5 <=20 mm/hr Final     CRP   Date Value Ref Range Status   02/03/2023 <1.0 <=10.0 mg/L Final     Uric Acid 02/2023: 4.9 mg/dL.     Assessment/Plan:    Brando was seen today for gout of multiple sites, unspecified cause, unspecified .    Diagnoses and all orders for this visit:    Gout of multiple sites, unspecified cause, unspecified chronicity  -     CBC w/auto differential Blood, Venous; Future  -     Comprehensive metabolic panel Blood, Venous; Future  -     C-reactive protein (Inflammation) Blood, Venous; Future  -     Sedimentation rate, automated Blood, Venous; Future  -     Uric acid Blood, Venous; Future  -     Ambulatory referral to Family Practice; Future  -     febuxostat (ULORIC) 80 mg tablet; Take 1 tablet (80 mg total) by mouth daily    Medication management  -     CBC w/auto differential Blood, Venous; Future  -     Comprehensive metabolic panel Blood, Venous; Future  -     C-reactive protein (Inflammation) Blood, Venous; Future  -     Sedimentation rate, automated Blood, Venous; Future  -     Uric acid Blood, Venous; Future  -     Ambulatory referral to Family Practice;  Future    Other specified counseling      Brando has gout that has low disease activity on Uloric 80 mg daily and colchicine 0.6 mg every other day. He is going to discontinue the colchicine and continue the Uloric at the same dose. We are going to get medication monitoring labs today. I will see him back in approximately 6 months. He requested a referral to a family medicine doctor to take over and establish care.      Discussed all in detail patient voiced understanding and is in agreement with plan.    On this date of service 40 minutes of total time was spent in evaluation and management of this encounter.  On this date of service 40 minutes were spent in other reportable services.     Doris Yates MD      1:07 PM, 8/15/2023    ATTESTATION:    Draft generated by Katy CLEMONS and edited by Lexi Turcios, Quality  on 08/15/2023.

## 2023-08-15 ENCOUNTER — LAB (OUTPATIENT)
Dept: LAB | Facility: CLINIC | Age: 63
End: 2023-08-15
Payer: MEDICARE

## 2023-08-15 ENCOUNTER — OFFICE VISIT (OUTPATIENT)
Dept: RHEUMATOLOGY | Facility: CLINIC | Age: 63
End: 2023-08-15
Payer: MEDICARE

## 2023-08-15 VITALS
HEIGHT: 78 IN | RESPIRATION RATE: 17 BRPM | SYSTOLIC BLOOD PRESSURE: 111 MMHG | OXYGEN SATURATION: 94 % | HEART RATE: 86 BPM | BODY MASS INDEX: 36.45 KG/M2 | DIASTOLIC BLOOD PRESSURE: 77 MMHG | WEIGHT: 315 LBS

## 2023-08-15 DIAGNOSIS — M10.9 GOUT OF MULTIPLE SITES, UNSPECIFIED CAUSE, UNSPECIFIED CHRONICITY: Primary | ICD-10-CM

## 2023-08-15 DIAGNOSIS — M10.9 GOUT OF MULTIPLE SITES, UNSPECIFIED CAUSE, UNSPECIFIED CHRONICITY: ICD-10-CM

## 2023-08-15 DIAGNOSIS — Z71.89 OTHER SPECIFIED COUNSELING: ICD-10-CM

## 2023-08-15 DIAGNOSIS — Z79.899 MEDICATION MANAGEMENT: ICD-10-CM

## 2023-08-15 LAB
ALBUMIN SERPL-MCNC: 3.9 G/DL (ref 3.5–5.3)
ALP SERPL-CCNC: 73 U/L (ref 45–115)
ALT SERPL-CCNC: 23 U/L (ref 7–52)
ANION GAP SERPL CALC-SCNC: 7 MMOL/L (ref 3–11)
AST SERPL-CCNC: 22 U/L
BASOPHILS # BLD AUTO: 0.1 10*3/UL
BASOPHILS NFR BLD AUTO: 0.8 % (ref 0–2)
BILIRUB SERPL-MCNC: 1.33 MG/DL (ref 0.2–1.4)
BUN SERPL-MCNC: 31 MG/DL (ref 7–25)
CALCIUM ALBUM COR SERPL-MCNC: 9.2 MG/DL (ref 8.6–10.3)
CALCIUM SERPL-MCNC: 9.1 MG/DL (ref 8.6–10.3)
CHLORIDE SERPL-SCNC: 107 MMOL/L (ref 98–107)
CO2 SERPL-SCNC: 26 MMOL/L (ref 21–32)
CREAT SERPL-MCNC: 1.4 MG/DL (ref 0.7–1.3)
CRP SERPL-MCNC: 1.1 MG/L
EGFRCR SERPLBLD CKD-EPI 2021: 56 ML/MIN/1.73M*2
EOSINOPHIL # BLD AUTO: 0.1 10*3/UL
EOSINOPHIL NFR BLD AUTO: 1.7 % (ref 0–3)
ERYTHROCYTE [DISTWIDTH] IN BLOOD BY AUTOMATED COUNT: 14.1 % (ref 11.5–15)
ERYTHROCYTE [SEDIMENTATION RATE] IN BLOOD: 1 MM/HR
GLUCOSE SERPL-MCNC: 105 MG/DL (ref 70–105)
HCT VFR BLD AUTO: 44.8 % (ref 38–50)
HGB BLD-MCNC: 15.2 G/DL (ref 13.2–17.2)
LYMPHOCYTES # BLD AUTO: 1.3 10*3/UL
LYMPHOCYTES NFR BLD AUTO: 19.1 % (ref 15–47)
MCH RBC QN AUTO: 32.8 PG (ref 29–34)
MCHC RBC AUTO-ENTMCNC: 34 G/DL (ref 32–36)
MCV RBC AUTO: 96.5 FL (ref 82–97)
MONOCYTES # BLD AUTO: 0.6 10*3/UL
MONOCYTES NFR BLD AUTO: 8.3 % (ref 5–13)
NEUTROPHILS # BLD AUTO: 4.9 10*3/UL
NEUTROPHILS NFR BLD AUTO: 70.1 % (ref 46–70)
PLATELET # BLD AUTO: 190 10*3/UL (ref 130–350)
PMV BLD AUTO: 8.3 FL (ref 6.9–10.8)
POTASSIUM SERPL-SCNC: 4.6 MMOL/L (ref 3.5–5.1)
PROT SERPL-MCNC: 6.6 G/DL (ref 6–8.3)
RBC # BLD AUTO: 4.64 10*6/ΜL (ref 4.1–5.8)
SODIUM SERPL-SCNC: 140 MMOL/L (ref 135–145)
URATE SERPL-MCNC: 4.7 MG/DL (ref 4.4–7.6)
WBC # BLD AUTO: 7 10*3/UL (ref 3.7–9.6)

## 2023-08-15 PROCEDURE — 80053 COMPREHEN METABOLIC PANEL: CPT | Mod: PO

## 2023-08-15 PROCEDURE — 36415 COLL VENOUS BLD VENIPUNCTURE: CPT | Mod: PO

## 2023-08-15 PROCEDURE — 85025 COMPLETE CBC W/AUTO DIFF WBC: CPT | Mod: PO

## 2023-08-15 PROCEDURE — 85652 RBC SED RATE AUTOMATED: CPT

## 2023-08-15 PROCEDURE — G0463 HOSPITAL OUTPT CLINIC VISIT: HCPCS | Mod: PO | Performed by: INTERNAL MEDICINE

## 2023-08-15 PROCEDURE — 99215 OFFICE O/P EST HI 40 MIN: CPT | Performed by: INTERNAL MEDICINE

## 2023-08-15 PROCEDURE — 86140 C-REACTIVE PROTEIN: CPT | Mod: PO

## 2023-08-15 PROCEDURE — 84550 ASSAY OF BLOOD/URIC ACID: CPT | Mod: PO

## 2023-08-15 RX ORDER — FEBUXOSTAT 80 MG/1
80 TABLET, FILM COATED ORAL DAILY
Qty: 90 TABLET | Refills: 1 | Status: SHIPPED | OUTPATIENT
Start: 2023-08-15 | End: 2023-12-19 | Stop reason: SDUPTHER

## 2023-08-15 ASSESSMENT — PAIN SCALES - GENERAL: PAINLEVEL: 2

## 2023-08-15 NOTE — LETTER
[facility]  [address] [address]  [phone]  Dept: [phone]  08/17/23      TONO Blank  [address] [place] [address]        To: TONO Blank      Thank you for referring your patient, Brando Sexton, to receive care in my office. I have enclosed a summary of the care provided to Brando on 08/17/23.    Please contact me with any questions you may have regarding the visit.    Sincerely,         Doris Yates MD  [address] [facility] [place] [address]  [phone]    CC: No Recipients

## 2023-08-15 NOTE — PATIENT INSTRUCTIONS
Today your gout is doing great.  Please continue the febuxostat 80 mg daily I did send in a refill    You can stop the colchicine but if you have a gout flare please contact me but you can start taking colchicine 1 tablet twice a day for 3 days then continue daily for 2 weeks then stop but that once again this is only if you have a flare    Please get labs today to include a uric acid I am going to see you back in 6 months

## 2023-08-17 ENCOUNTER — TELEPHONE (OUTPATIENT)
Dept: RHEUMATOLOGY | Facility: CLINIC | Age: 63
End: 2023-08-17
Payer: MEDICARE

## 2023-08-17 NOTE — TELEPHONE ENCOUNTER
----- Message from Doris Yates MD sent at 8/17/2023  8:38 AM MDT -----  Please let him know that his liver test as well as his blood counts and inflammatory markers looked great.  His uric acid was below 5 which I was happy to see    His creatinine was slightly elevated and his GFR was slightly low.  This is not the first time that I have seen this and it happens on occasion.  Please tell him to try to stay hydrated.  He may have just been slightly dehydrated when he went in for labs.

## 2023-09-02 ENCOUNTER — TELEPHONE (OUTPATIENT)
Dept: CALL CENTER | Facility: HOSPITAL | Age: 63
End: 2023-09-02

## 2023-09-05 NOTE — TELEPHONE ENCOUNTER
"Called patient who says he had some left foot and left ankle soreness a few days ago- but says he realized that it was due to \"laying on the ground trying to put up blinds.\"  He says it has resolved completely and does not need anything from us at this time. I asked him to reach out if he needs anything in the future.   "

## 2023-09-25 ENCOUNTER — LAB (OUTPATIENT)
Dept: LAB | Facility: CLINIC | Age: 63
End: 2023-09-25
Payer: MEDICARE

## 2023-09-25 DIAGNOSIS — I48.91 ATRIAL FIBRILLATION, UNSPECIFIED TYPE (CMS/HCC): ICD-10-CM

## 2023-09-25 LAB
INR BLD: 2
PROTHROMBIN TIME: 23.1 SECONDS (ref 9.4–12.5)

## 2023-09-25 PROCEDURE — 36415 COLL VENOUS BLD VENIPUNCTURE: CPT

## 2023-09-25 PROCEDURE — 85610 PROTHROMBIN TIME: CPT

## 2023-10-17 ENCOUNTER — OFFICE VISIT (OUTPATIENT)
Dept: URGENT CARE | Facility: CLINIC | Age: 63
End: 2023-10-17
Payer: MEDICARE

## 2023-10-17 ENCOUNTER — ANCILLARY PROCEDURE (OUTPATIENT)
Dept: RADIOLOGY | Facility: CLINIC | Age: 63
End: 2023-10-17
Payer: MEDICARE

## 2023-10-17 VITALS
HEART RATE: 72 BPM | TEMPERATURE: 97.4 F | RESPIRATION RATE: 18 BRPM | OXYGEN SATURATION: 95 % | DIASTOLIC BLOOD PRESSURE: 76 MMHG | WEIGHT: 315 LBS | BODY MASS INDEX: 37.44 KG/M2 | SYSTOLIC BLOOD PRESSURE: 118 MMHG

## 2023-10-17 DIAGNOSIS — M79.645 PAIN OF FINGER OF LEFT HAND: Primary | ICD-10-CM

## 2023-10-17 DIAGNOSIS — S63.631A SPRAIN OF INTERPHALANGEAL JOINT OF LEFT INDEX FINGER, INITIAL ENCOUNTER: ICD-10-CM

## 2023-10-17 PROBLEM — R51.9 HEADACHE: Status: ACTIVE | Noted: 2023-10-17

## 2023-10-17 PROBLEM — N40.0 BENIGN PROSTATIC HYPERTROPHY WITHOUT URINARY OBSTRUCTION: Status: ACTIVE | Noted: 2023-10-17

## 2023-10-17 PROBLEM — K21.9 GASTROESOPHAGEAL REFLUX DISEASE: Status: ACTIVE | Noted: 2023-10-17

## 2023-10-17 PROBLEM — S89.90XA INJURY OF KNEE: Status: ACTIVE | Noted: 2023-10-17

## 2023-10-17 PROBLEM — R60.9 EDEMA: Status: ACTIVE | Noted: 2023-10-17

## 2023-10-17 PROBLEM — I45.9 CONDUCTION DISORDER OF THE HEART: Status: ACTIVE | Noted: 2023-10-17

## 2023-10-17 PROBLEM — N50.89 SCROTAL MASS: Status: ACTIVE | Noted: 2023-10-17

## 2023-10-17 PROBLEM — G45.9 TRANSIENT ISCHEMIC ATTACK: Status: ACTIVE | Noted: 2017-11-02

## 2023-10-17 PROCEDURE — 73140 X-RAY EXAM OF FINGER(S): CPT | Mod: LT

## 2023-10-17 PROCEDURE — 73140 X-RAY EXAM OF FINGER(S): CPT | Mod: 26,LT | Performed by: RADIOLOGY

## 2023-10-17 PROCEDURE — G0463 HOSPITAL OUTPT CLINIC VISIT: HCPCS | Performed by: PHYSICIAN ASSISTANT

## 2023-10-17 PROCEDURE — 99213 OFFICE O/P EST LOW 20 MIN: CPT | Performed by: PHYSICIAN ASSISTANT

## 2023-10-17 ASSESSMENT — ENCOUNTER SYMPTOMS
SORE THROAT: 0
PALPITATIONS: 0
VOMITING: 0
COLOR CHANGE: 0
ABDOMINAL PAIN: 0
COUGH: 0
ARTHRALGIAS: 1
HEMATURIA: 0
FEVER: 0
BACK PAIN: 0
CHILLS: 0
EYE PAIN: 0
SEIZURES: 0
SHORTNESS OF BREATH: 0
DYSURIA: 0

## 2023-10-17 NOTE — PROGRESS NOTES
"Subjective      Brando Sexton is a 63 y.o. male who presents for left index finger pain    HPI  Patient states that he stepped on a shovel about a week ago.  He fell down.  In that process he thinks he \"jammed\" his left index finger.  He has been icing it and putting some liniments on it however is concerned that it possibly needs an x-ray.  He does have swelling in he does have some limited flexion due to the swelling.  He is right-handed.  Does have arthritis in this injured finger  The following have been reviewed and updated as appropriate in this visit:   Tobacco  Allergies  Meds  Problems  Med Hx  Surg Hx  Fam Hx         Allergies   Allergen Reactions    Allopurinol     Cephalexin      Current Outpatient Medications   Medication Sig Dispense Refill    febuxostat (ULORIC) 80 mg tablet Take 1 tablet (80 mg total) by mouth daily 90 tablet 1    CARVEDILOL ORAL Take by mouth 2 (two) times a day Unknown mg      ascorbic acid (VITAMIN C ORAL) Take by mouth      lisinopril (PRINIVIL,ZESTRIL) 10 mg tablet Take 1 tablet(s) every day by oral route. 30 2    furosemide (LASIX) 20 mg tablet 2/day 180 3    warfarin (COUMADIN) 5 mg tablet 4 mg Alternates 2 mg with 4 mg every other day. 50 0    albuterol HFA (PROVENTIL HFA;VENTOLIN HFA) 90 mcg/actuation inhaler Inhale 2 puffs every 4 hours by inhalation route. 1 0    colchicine 0.6 mg tablet Take 1 tablet (0.6 mg total) by mouth every other day (Patient not taking: Reported on 10/17/2023) 45 tablet 1     No current facility-administered medications for this visit.     Past Medical History:   Diagnosis Date    Atrial fibrillation (CMS/HCC)     Gout     Psoriasis      Past Surgical History:   Procedure Laterality Date    CARDIOVERSION  03/2010    COLONOSCOPY  01/01/1995    ORTHOPEDIC SURGERY Bilateral     Knee Scope    OTHER SURGICAL HISTORY Right     Orthopedic:Rotator cuff x2 Right shoulder     Family History   Problem Relation Age of Onset    Heart disease " Mother     Colon cancer Mother     Heart disease Father         MI at age 87    Other Father         Crohns    Psoriasis Father     Asthma Father     Diabetes type II Father's Sister     Heart disease Father's Brother      Social History     Socioeconomic History    Marital status:    Tobacco Use    Smoking status: Former    Smokeless tobacco: Former   Substance and Sexual Activity    Alcohol use: No     Social Determinants of Health     Tobacco Use: Medium Risk (8/17/2023)    Patient History     Smoking Tobacco Use: Former     Smokeless Tobacco Use: Former       Review of Systems   Constitutional:  Negative for chills and fever.   HENT:  Negative for ear pain and sore throat.    Eyes:  Negative for pain and visual disturbance.   Respiratory:  Negative for cough and shortness of breath.    Cardiovascular:  Negative for chest pain and palpitations.   Gastrointestinal:  Negative for abdominal pain and vomiting.   Genitourinary:  Negative for dysuria and hematuria.   Musculoskeletal:  Positive for arthralgias (left index finger). Negative for back pain.   Skin:  Negative for color change and rash.   Neurological:  Negative for seizures and syncope.   All other systems reviewed and are negative.      Objective   /76 (BP Location: Right arm, Patient Position: Sitting, Cuff Size: Large Long Adult)   Pulse 72   Temp 36.3 °C (97.4 °F) (Temporal)   Resp 18   Wt (!) 147 kg (324 lb)   SpO2 95%   BMI 37.44 kg/m²     Physical Exam  Vitals and nursing note reviewed.   Constitutional:       Appearance: Normal appearance.   Cardiovascular:      Rate and Rhythm: Normal rate and regular rhythm.      Heart sounds: Normal heart sounds.   Pulmonary:      Breath sounds: Normal breath sounds.   Musculoskeletal:      Comments: Left index finger with swelling at the PIP joint.  Some ecchymosis noted.  No laxity of the ligaments.  He is able to flex but not completely limited due to swelling.  No pain at the MCP.   Capillary refill is normal and sensation is normal distally   Neurological:      Mental Status: He is alert.         No results found for this or any previous visit (from the past 24 hour(s)).  Reading Physician Reading Date Result Priority   Felipe Siddiqui MD  [phone] 10/17/2023      Narrative & Impression  EXAM:  DX FINGER 2 OR MORE VW LEFT     DATE: 10/17/2023 8:14 AM     INDICATION: Pain of finger of left hand; finger pain; Pointer finger     COMPARISON: None available.      Findings:  There is no acute fracture or dislocation. There are minimal degenerative changes of the interphalangeal joints.        IMPRESSION:     1. No acute fracture.           Exam Ended: 10/17/23 08:           Assessment/Plan   Advised patient to buddy tape to the other finger.  Finger sprain.  There are some arthritis however no fracture.  He is relieved by this.  He would continue to ice and work on flexion of this.  Follow-up with PCP as needed    Diagnoses and all orders for this visit:    Pain of finger of left hand  -     X-ray finger 2 or more views left    Sprain of interphalangeal joint of left index finger, initial encounter        Patient Instructions   declined    TONO Gordillo

## 2023-10-22 ENCOUNTER — ANCILLARY PROCEDURE (OUTPATIENT)
Dept: RADIOLOGY | Facility: CLINIC | Age: 63
End: 2023-10-22
Payer: MEDICARE

## 2023-10-22 ENCOUNTER — OFFICE VISIT (OUTPATIENT)
Dept: URGENT CARE | Facility: CLINIC | Age: 63
End: 2023-10-22
Payer: MEDICARE

## 2023-10-22 VITALS
WEIGHT: 315 LBS | BODY MASS INDEX: 36.45 KG/M2 | OXYGEN SATURATION: 94 % | HEIGHT: 78 IN | DIASTOLIC BLOOD PRESSURE: 68 MMHG | SYSTOLIC BLOOD PRESSURE: 129 MMHG | HEART RATE: 67 BPM | TEMPERATURE: 97.2 F

## 2023-10-22 DIAGNOSIS — L03.012 CELLULITIS OF FINGER OF LEFT HAND: ICD-10-CM

## 2023-10-22 DIAGNOSIS — M79.89 SWELLING OF LEFT HAND: Primary | ICD-10-CM

## 2023-10-22 LAB
BASOPHILS # BLD AUTO: 0.1 10*3/UL
BASOPHILS NFR BLD AUTO: 1 % (ref 0–2)
EOSINOPHIL # BLD AUTO: 0.3 10*3/UL
EOSINOPHIL NFR BLD AUTO: 4.1 % (ref 0–3)
ERYTHROCYTE [DISTWIDTH] IN BLOOD BY AUTOMATED COUNT: 13.7 % (ref 11.5–15)
HCT VFR BLD AUTO: 44.3 % (ref 38–50)
HGB BLD-MCNC: 14.9 G/DL (ref 13.2–17.2)
LYMPHOCYTES # BLD AUTO: 1.5 10*3/UL
LYMPHOCYTES NFR BLD AUTO: 19.5 % (ref 15–47)
MCH RBC QN AUTO: 32.9 PG (ref 29–34)
MCHC RBC AUTO-ENTMCNC: 33.7 G/DL (ref 32–36)
MCV RBC AUTO: 97.4 FL (ref 82–97)
MONOCYTES # BLD AUTO: 0.8 10*3/UL
MONOCYTES NFR BLD AUTO: 10.3 % (ref 5–13)
NEUTROPHILS # BLD AUTO: 5.1 10*3/UL
NEUTROPHILS NFR BLD AUTO: 65.1 % (ref 46–70)
PLATELET # BLD AUTO: 209 10*3/UL (ref 130–350)
PMV BLD AUTO: 7.7 FL (ref 6.9–10.8)
RBC # BLD AUTO: 4.55 10*6/ΜL (ref 4.1–5.8)
URATE SERPL-MCNC: 4.7 MG/DL (ref 4.4–7.6)
WBC # BLD AUTO: 7.9 10*3/UL (ref 3.7–9.6)

## 2023-10-22 PROCEDURE — G0463 HOSPITAL OUTPT CLINIC VISIT: HCPCS

## 2023-10-22 PROCEDURE — 84550 ASSAY OF BLOOD/URIC ACID: CPT

## 2023-10-22 PROCEDURE — 99213 OFFICE O/P EST LOW 20 MIN: CPT

## 2023-10-22 PROCEDURE — 36415 COLL VENOUS BLD VENIPUNCTURE: CPT

## 2023-10-22 PROCEDURE — 73130 X-RAY EXAM OF HAND: CPT | Mod: LT

## 2023-10-22 PROCEDURE — 85025 COMPLETE CBC W/AUTO DIFF WBC: CPT

## 2023-10-22 RX ORDER — AMOXICILLIN 875 MG/1
875 TABLET, FILM COATED ORAL 2 TIMES DAILY
Qty: 14 TABLET | Refills: 0 | Status: SHIPPED | OUTPATIENT
Start: 2023-10-22 | End: 2023-10-29

## 2023-10-22 RX ORDER — COLCHICINE 0.6 MG/1
0.6 TABLET ORAL
Qty: 10 TABLET | Refills: 0 | Status: SHIPPED | OUTPATIENT
Start: 2023-10-22 | End: 2023-12-09 | Stop reason: ALTCHOICE

## 2023-10-22 ASSESSMENT — ENCOUNTER SYMPTOMS
DIAPHORESIS: 0
FEVER: 0
JOINT SWELLING: 1
CHILLS: 0

## 2023-10-22 NOTE — PATIENT INSTRUCTIONS
Tomorrow call your PCP Sydnee Zhou and let her know you were put on an antiobiotic and will need to have an order for your PT/INR to get checked this week. I would recommend getting your level checked on Thursday or Friday.      This week, let your rheumatologist know that you were treated for a gout flare. Ask them what your dosing should be. The medication interacts with your carvedilol.     Elevate, no more mario taping to reduce the swelling, and ice 20 minutes 4 times per day.

## 2023-10-22 NOTE — PROGRESS NOTES
Subjective      Brando Sexton is a 63 y.o. male who presents for left hand swelling.    HPI    63 year old male presents for left hand swelling. Started this morning. He injured his index finger last week and has been mario taping it with coband. Today's wrap had been on for 2 days. Swelling and redness started today. Today the 3rd finger joint hurts more than the injured joint. Denies injury to this area, but maybe bumped it. No fevers, chills or sweats.     The following have been reviewed and updated as appropriate in this visit:          Allergies   Allergen Reactions    Allopurinol     Cephalexin      Current Outpatient Medications   Medication Sig Dispense Refill    febuxostat (ULORIC) 80 mg tablet Take 1 tablet (80 mg total) by mouth daily 90 tablet 1    CARVEDILOL ORAL Take by mouth 2 (two) times a day Unknown mg      ascorbic acid (VITAMIN C ORAL) Take by mouth      lisinopril (PRINIVIL,ZESTRIL) 10 mg tablet Take 1 tablet(s) every day by oral route. 30 2    furosemide (LASIX) 20 mg tablet 2/day 180 3    warfarin (COUMADIN) 5 mg tablet 4 mg Alternates 2 mg with 4 mg every other day. 50 0    amoxicillin (AMOXIL) 875 mg tablet Take 1 tablet (875 mg total) by mouth 2 (two) times a day for 7 days 14 tablet 0    colchicine 0.6 mg tablet Take 1 tablet (0.6 mg total) by mouth every 3 (three) days for 10 doses Take one tablet every three days. 10 tablet 0    colchicine 0.6 mg tablet Take 1 tablet (0.6 mg total) by mouth every other day (Patient not taking: Reported on 10/17/2023) 45 tablet 1    albuterol HFA (PROVENTIL HFA;VENTOLIN HFA) 90 mcg/actuation inhaler Inhale 2 puffs every 4 hours by inhalation route. 1 0     No current facility-administered medications for this visit.     Past Medical History:   Diagnosis Date    Atrial fibrillation (CMS/HCC)     Gout     Psoriasis      Past Surgical History:   Procedure Laterality Date    CARDIOVERSION  03/2010    COLONOSCOPY  01/01/1995    ORTHOPEDIC SURGERY  "Bilateral     Knee Scope    OTHER SURGICAL HISTORY Right     Orthopedic:Rotator cuff x2 Right shoulder     Family History   Problem Relation Age of Onset    Heart disease Mother     Colon cancer Mother     Heart disease Father         MI at age 87    Other Father         Crohns    Psoriasis Father     Asthma Father     Diabetes type II Father's Sister     Heart disease Father's Brother      Social History     Socioeconomic History    Marital status:    Tobacco Use    Smoking status: Former    Smokeless tobacco: Former   Substance and Sexual Activity    Alcohol use: No     Social Determinants of Health     Tobacco Use: Medium Risk (10/22/2023)    Patient History     Smoking Tobacco Use: Former     Smokeless Tobacco Use: Former       Review of Systems   Constitutional:  Negative for chills, diaphoresis and fever.   Musculoskeletal:  Positive for joint swelling.       Objective   /68   Pulse 67   Temp 36.2 °C (97.2 °F)   Ht 1.981 m (6' 6\")   Wt (!) 147 kg (324 lb)   SpO2 94%   BMI 37.44 kg/m²     Physical Exam  Vitals and nursing note reviewed.   Constitutional:       Appearance: Normal appearance.   Cardiovascular:      Rate and Rhythm: Normal rate and regular rhythm.      Heart sounds: Normal heart sounds.   Pulmonary:      Effort: Pulmonary effort is normal.      Breath sounds: Normal breath sounds.   Musculoskeletal:      Left hand: Swelling and tenderness present. Decreased range of motion. Normal capillary refill. Normal pulse.      Comments: Maximal tenderness at 3rd MCP, minimal tenderness at 2nd MCP joint, no tenderness elsewhere. Swelling and mild erythema on dorsum of hand.   Neurological:      Mental Status: He is alert and oriented to person, place, and time.   Psychiatric:         Mood and Affect: Mood normal.         Behavior: Behavior normal.         Assessment/Plan   Diagnoses and all orders for this visit:    Swelling of left hand  -     X-ray hand 3 or more views left  -     CBC " w/auto differential Blood, Venous  -     Uric acid Blood, Venous  -     colchicine 0.6 mg tablet; Take 1 tablet (0.6 mg total) by mouth every 3 (three) days for 10 doses Take one tablet every three days.    Cellulitis of finger of left hand  -     amoxicillin (AMOXIL) 875 mg tablet; Take 1 tablet (875 mg total) by mouth 2 (two) times a day for 7 days    X-ray read by V rad, shows no acute osseous abnormalities.  There is arthritis in his thumb.  Final overread expected tomorrow.    Unclear if this is cellulitis or gout flare.  He has a normal uric acid today as well as a normal white blood cell count.  I gave him a few treatment options including treating for gout flare and returning if this worsens for antibiotics.  He would like to try colchicine and antibiotics today.    Colchicine and carvedilol have increased risk of colchicine toxicity.  I would like him to take 1 colchicine every 3 days per Lexicomp guidelines.  He may contact his rheumatologist tomorrow and ask if he can increase the dosing.    Discussed that amoxicillin as well as all other antibiotics will increase his INR.  I would like him to contact his PCP who manages his warfarin tomorrow and get an order for PT/INR this week, preferably in 4 to 5 days.  If he is unable to reach them or they are unable to do this, he may contact me clinic and I will put in this order.    If redness and swelling is worsening, he will return for repeat evaluation.      Candi Jack, CNP

## 2023-10-23 ENCOUNTER — TELEPHONE (OUTPATIENT)
Dept: RHEUMATOLOGY | Facility: CLINIC | Age: 63
End: 2023-10-23

## 2023-10-23 PROCEDURE — 73130 X-RAY EXAM OF HAND: CPT | Mod: 26,LT | Performed by: RADIOLOGY

## 2023-10-23 NOTE — TELEPHONE ENCOUNTER
Caller would like to discuss (a)  Clinical Questions  Writer has advised caller of a callback from within 24 hours.    Patient: Brando Sexton    Caller Name (Last and first, relation/role): self    Name of Facility: [facility]    Callback Number: [phone]    Best Availability: anytime    Fax Number: na    Additional Info: Would like a call to discuss being diagnoses of cellulites at  urgent care    Did you confirm the message with the caller: Yes    Is it okay that the nurse communicates your response through MyChart? No

## 2023-10-23 NOTE — TELEPHONE ENCOUNTER
FYI- pt wanted to keep you informed of current injury- wants to know if you have any recommendations or if he needs to hold any meds.   Had a fall last week and injured his L hand and fingers on 10/17/23 and on 10/18/23 and 10/20/23 had red light laser therapy via chiropractor on hand and as well as today     [facility] in [place]  diagnosis of cellulites in L hand and wrist on 10/22/23  Amoxicillin prescribed- does not know the dosage

## 2023-10-30 ENCOUNTER — LAB (OUTPATIENT)
Dept: LAB | Facility: CLINIC | Age: 63
End: 2023-10-30
Payer: MEDICARE

## 2023-10-30 DIAGNOSIS — I48.91 ATRIAL FIBRILLATION, UNSPECIFIED TYPE (CMS/HCC): ICD-10-CM

## 2023-10-30 LAB
INR BLD: 2.2
PROTHROMBIN TIME: 25.2 SECONDS (ref 9.4–12.5)

## 2023-10-30 PROCEDURE — 85610 PROTHROMBIN TIME: CPT

## 2023-10-30 PROCEDURE — 36415 COLL VENOUS BLD VENIPUNCTURE: CPT

## 2023-10-31 ENCOUNTER — OFFICE VISIT (OUTPATIENT)
Dept: URGENT CARE | Facility: CLINIC | Age: 63
End: 2023-10-31
Payer: MEDICARE

## 2023-10-31 VITALS
WEIGHT: 315 LBS | DIASTOLIC BLOOD PRESSURE: 82 MMHG | HEART RATE: 63 BPM | SYSTOLIC BLOOD PRESSURE: 134 MMHG | OXYGEN SATURATION: 96 % | BODY MASS INDEX: 38.02 KG/M2 | TEMPERATURE: 97.4 F | RESPIRATION RATE: 18 BRPM

## 2023-10-31 DIAGNOSIS — U07.1 COVID-19: Primary | ICD-10-CM

## 2023-10-31 DIAGNOSIS — J06.9 ACUTE UPPER RESPIRATORY INFECTION, UNSPECIFIED: ICD-10-CM

## 2023-10-31 PROBLEM — M10.9 GOUTY ARTHROPATHY: Status: ACTIVE | Noted: 2023-10-31

## 2023-10-31 PROBLEM — Z51.81 ENCOUNTER FOR THERAPEUTIC DRUG MONITORING: Status: ACTIVE | Noted: 2017-11-02

## 2023-10-31 PROBLEM — M17.0 OSTEOARTHRITIS OF BOTH KNEES: Status: ACTIVE | Noted: 2023-07-12

## 2023-10-31 LAB — SARS-COV-2 RNA RESP QL NAA+PROBE: POSITIVE

## 2023-10-31 PROCEDURE — G0463 HOSPITAL OUTPT CLINIC VISIT: HCPCS | Performed by: NURSE PRACTITIONER

## 2023-10-31 PROCEDURE — 87635 SARS-COV-2 COVID-19 AMP PRB: CPT | Performed by: NURSE PRACTITIONER

## 2023-10-31 PROCEDURE — 99213 OFFICE O/P EST LOW 20 MIN: CPT | Performed by: NURSE PRACTITIONER

## 2023-10-31 RX ORDER — WARFARIN 1 MG/1
1 TABLET ORAL
COMMUNITY
Start: 2023-07-12 | End: 2023-12-19 | Stop reason: SDUPTHER

## 2023-10-31 ASSESSMENT — ENCOUNTER SYMPTOMS
CHILLS: 0
RHINORRHEA: 0
DIARRHEA: 0
EYE PAIN: 0
SORE THROAT: 1
DIZZINESS: 0
CHEST TIGHTNESS: 0
HEADACHES: 0
SINUS PAIN: 0
FEVER: 0
PALPITATIONS: 0
TROUBLE SWALLOWING: 0
SHORTNESS OF BREATH: 0
SINUS PRESSURE: 0
NERVOUS/ANXIOUS: 0
NAUSEA: 0
WHEEZING: 0
AGITATION: 0
COUGH: 1
DIFFICULTY URINATING: 0
LIGHT-HEADEDNESS: 0
ARTHRALGIAS: 0
ABDOMINAL PAIN: 0
FREQUENCY: 0
VOMITING: 0
WEAKNESS: 0
MYALGIAS: 0
CONSTIPATION: 0

## 2023-10-31 NOTE — PATIENT INSTRUCTIONS
Take Tylenol 1000 mg every 6 hours as needed for fever or body aches. It is very important to drink plenty of fluids.  Drink small amounts frequently.     Your COVID-19 test has returned Positive. To prevent the spread of COVID-19 to those around you, follow isolation guidance from the CDC:  Everyone, regardless of vaccination status  Stay home for 5 days.  If you have no symptoms or your symptoms are resolving after 5 days, you can leave your house.  Continue to wear a mask around others for 5 additional days.  If you have a fever, continue to stay home until your fever resolves without the use of fever-reducing medications.    If you live with others, prevent the spread of infection to them during isolation by remaining in a limited area of your home, avoiding sharing personal items, maintaining a distance of six feet or more from others, covering your coughs and sneezes, cleaning your hands often and disinfect frequently touched surfaces daily.    [facility] is offering a free of charge service to assist and guide COVID-19 positive patients as they manage their symptoms at home. [facility] is a home monitoring system that allows us to monitor your symptoms quickly and efficiently and intervene to help if your symptoms escalate. If you are age 18 and older and interested in this assistance in managing your COVID-19 illness, please call [facility] Nurse Triage at [phone].    If your symptoms are worsening or you are concerned about your wellbeing, please contact your primary care provider (PCP) or call [facility] Nurse triage to speak to a nurse at [phone]. If you have a medical emergency call [phone].    Quarantine is important for anyone that you have been in direct contact with 2 days prior to your symptoms starting. Quarantine is used to keep someone who might have been exposed to COVID19 away from others and helps prevent the spread of disease that occurs before a person knows  that they are sick.   Any close contacts in quarantine that become symptomatic schedule COVID19 testing right away via quick self-scheduling option at [facility] or call [phone]  CDC guidelines for Quarantine include:  If you/exposed person(s):  Have been boosted OR  Completed the primary series of Pfizer or Moderna vaccine within the last 6 months OR  Completed the primary series of J&J vaccine within the last 2 months  Then:   Wear a mask around others for 10 days.  Test on day 5, if possible.  If you develop symptoms get a test and stay home.    If you/exposed person(s):  Completed the primary series of Pfizer or Moderna vaccine over 6 months ago and are not boosted OR  Completed the primary series of J&J over 2 months ago and are not boosted OR  Are unvaccinated  Then:   Stay home for 5 days. After that continue to wear a mask around others for 5 additional days.  If you can't quarantine you must wear a mask for 10 days.  Test on day 5 if possible.  If you develop symptoms get a test and stay home

## 2023-10-31 NOTE — PROGRESS NOTES
Subjective      Brando Sexton is a 63 y.o. male who presents for sore throat.    Patient presents today for complaints of sore throat.  He states symptoms started yesterday.  He actually just finished antibiotics for a cellulitis in his hand.  He also stated he had a pest control company in his house yesterday that sprayed and felt that his sore throat worsened after they were there.  He does not have any fever or chills.  He is also complaining of a slight rash on his back and forearms some itching which she thinks is related to the past control spraying.        The following have been reviewed and updated as appropriate in this visit:   Tobacco  Allergies  Meds  Problems  Med Hx  Surg Hx  Fam Hx         Allergies   Allergen Reactions    Allopurinol     Cephalexin      Current Outpatient Medications   Medication Sig Dispense Refill    warfarin (COUMADIN) 1 mg tablet Take 1 tablet (1 mg total) by mouth daily      colchicine 0.6 mg tablet Take 1 tablet (0.6 mg total) by mouth every 3 (three) days for 10 doses Take one tablet every three days. 10 tablet 0    febuxostat (ULORIC) 80 mg tablet Take 1 tablet (80 mg total) by mouth daily 90 tablet 1    CARVEDILOL ORAL Take by mouth 2 (two) times a day Unknown mg      ascorbic acid (VITAMIN C ORAL) Take by mouth      lisinopril (PRINIVIL,ZESTRIL) 10 mg tablet Take 1 tablet(s) every day by oral route. 30 2    furosemide (LASIX) 20 mg tablet 2/day 180 3    warfarin (COUMADIN) 5 mg tablet 4 mg Alternates 2 mg with 4 mg every other day. 50 0    albuterol HFA (PROVENTIL HFA;VENTOLIN HFA) 90 mcg/actuation inhaler Inhale 2 puffs every 4 hours by inhalation route. 1 0    colchicine 0.6 mg tablet Take 1 tablet (0.6 mg total) by mouth every other day (Patient not taking: Reported on 10/17/2023) 45 tablet 1     No current facility-administered medications for this visit.     Past Medical History:   Diagnosis Date    Atrial fibrillation (CMS/HCC)     Gout     Psoriasis       Past Surgical History:   Procedure Laterality Date    CARDIOVERSION  03/2010    COLONOSCOPY  01/01/1995    ORTHOPEDIC SURGERY Bilateral     Knee Scope    OTHER SURGICAL HISTORY Right     Orthopedic:Rotator cuff x2 Right shoulder     Family History   Problem Relation Age of Onset    Heart disease Mother     Colon cancer Mother     Heart disease Father         MI at age 87    Other Father         Crohns    Psoriasis Father     Asthma Father     Diabetes type II Father's Sister     Heart disease Father's Brother      Social History     Socioeconomic History    Marital status:    Tobacco Use    Smoking status: Former    Smokeless tobacco: Former   Substance and Sexual Activity    Alcohol use: No     Social Determinants of Health     Tobacco Use: Medium Risk (10/31/2023)    Patient History     Smoking Tobacco Use: Former     Smokeless Tobacco Use: Former       Review of Systems   Constitutional:  Negative for chills and fever.   HENT:  Positive for sore throat. Negative for congestion, ear pain, rhinorrhea, sinus pressure, sinus pain and trouble swallowing.    Eyes:  Negative for pain and visual disturbance.   Respiratory:  Positive for cough. Negative for chest tightness, shortness of breath and wheezing.    Cardiovascular:  Negative for chest pain and palpitations.   Gastrointestinal:  Negative for abdominal pain, constipation, diarrhea, nausea and vomiting.   Endocrine: Negative for cold intolerance and heat intolerance.   Genitourinary:  Negative for difficulty urinating, frequency and urgency.   Musculoskeletal:  Negative for arthralgias and myalgias.   Skin:  Positive for rash.   Neurological:  Negative for dizziness, weakness, light-headedness and headaches.   Psychiatric/Behavioral:  Negative for agitation. The patient is not nervous/anxious.        Objective   /82   Pulse 63   Temp 36.3 °C (97.4 °F)   Resp 18   Wt (!) 149.2 kg (329 lb)   SpO2 96%   BMI 38.02 kg/m²   BP Readings from Last 3  Encounters:   10/31/23 134/82   10/22/23 129/68   10/17/23 118/76      Wt Readings from Last 3 Encounters:   10/31/23 (!) 149.2 kg (329 lb)   10/22/23 (!) 147 kg (324 lb)   10/17/23 (!) 147 kg (324 lb)      Pulse Readings from Last 3 Encounters:   10/31/23 63   10/22/23 67   10/17/23 72      Resp Readings from Last 3 Encounters:   10/31/23 18   10/17/23 18   08/15/23 17       Physical Exam  Vitals and nursing note reviewed.   Constitutional:       Appearance: Normal appearance. He is normal weight.   HENT:      Head: Normocephalic and atraumatic.      Right Ear: Tympanic membrane normal.      Left Ear: Tympanic membrane normal.      Nose: Nose normal.      Mouth/Throat:      Mouth: Mucous membranes are moist.      Pharynx: No oropharyngeal exudate or posterior oropharyngeal erythema.   Eyes:      Extraocular Movements: Extraocular movements intact.      Pupils: Pupils are equal, round, and reactive to light.   Cardiovascular:      Rate and Rhythm: Normal rate and regular rhythm.      Pulses: Normal pulses.      Heart sounds: Normal heart sounds.   Pulmonary:      Effort: Pulmonary effort is normal.      Breath sounds: Normal breath sounds.   Abdominal:      General: Abdomen is flat. Bowel sounds are normal.      Palpations: Abdomen is soft.   Musculoskeletal:         General: Normal range of motion.      Cervical back: Normal range of motion and neck supple.   Lymphadenopathy:      Cervical: No cervical adenopathy.   Skin:     General: Skin is warm and dry.      Capillary Refill: Capillary refill takes less than 2 seconds.   Neurological:      General: No focal deficit present.      Mental Status: He is alert and oriented to person, place, and time.   Psychiatric:         Mood and Affect: Mood normal.         Behavior: Behavior normal.         Recent Results (from the past 24 hour(s))   Protime-INR Blood, Venous    Collection Time: 10/30/23  9:27 AM   Result Value Ref Range    Protime 25.2 (H) 9.4 - 12.5 SECONDS     INR 2.2 (H) <=1.1   COVID-19 PCR    Collection Time: 10/31/23  8:00 AM    Specimen: Nasopharynx; Swab   Result Value Ref Range    COVID-19 PCR Positive (A) Negative       Assessment/Plan   Diagnoses and all orders for this visit:    COVID-19    Acute upper respiratory infection, unspecified  -     COVID-19 PCR    Patient's COVID PCR is positive.  I did discuss with him the COVID-19 CDC recommendations for quarantining.  He will follow these as requested.  He is on blood thinning medications that interact with Paxlovid so he does not qualify for this treatment.  We discussed the infusion therapy which she had had previously when he had COVID a few years ago.  We discussed the fact that this infusion is not normally being given as the efficacy of it has been limited.  He will continue to monitor symptoms and if they worsen he will call and have a telephone visit and decide at that time whether he would like to move forward with possible infusion.  He has only a sore throat today with no fever, chills, shortness of breath and random coughing.  He is instructed on the importance of presenting to the emergency room if he has any severe cough, chest pain shortness of breath or difficulty swallowing.  He is also told that he has up to 7 days to decide on the infusion if this is something he wants to move forward with.    Patient Instructions   Take Tylenol 1000 mg every 6 hours as needed for fever or body aches. It is very important to drink plenty of fluids.  Drink small amounts frequently.     Your COVID-19 test has returned Positive. To prevent the spread of COVID-19 to those around you, follow isolation guidance from the CDC:  Everyone, regardless of vaccination status  Stay home for 5 days.  If you have no symptoms or your symptoms are resolving after 5 days, you can leave your house.  Continue to wear a mask around others for 5 additional days.  If you have a fever, continue to stay home until your fever resolves  without the use of fever-reducing medications.    If you live with others, prevent the spread of infection to them during isolation by remaining in a limited area of your home, avoiding sharing personal items, maintaining a distance of six feet or more from others, covering your coughs and sneezes, cleaning your hands often and disinfect frequently touched surfaces daily.    [facility] is offering a free of charge service to assist and guide COVID-19 positive patients as they manage their symptoms at home. [facility] is a home monitoring system that allows us to monitor your symptoms quickly and efficiently and intervene to help if your symptoms escalate. If you are age 18 and older and interested in this assistance in managing your COVID-19 illness, please call [facility] Nurse Triage at [phone].    If your symptoms are worsening or you are concerned about your wellbeing, please contact your primary care provider (PCP) or call [facility] Nurse triage to speak to a nurse at [phone]. If you have a medical emergency call [phone].    Quarantine is important for anyone that you have been in direct contact with 2 days prior to your symptoms starting. Quarantine is used to keep someone who might have been exposed to COVID19 away from others and helps prevent the spread of disease that occurs before a person knows that they are sick.   Any close contacts in quarantine that become symptomatic schedule COVID19 testing right away via quick self-scheduling option at [facility] or call [phone]  CDC guidelines for Quarantine include:  If you/exposed person(s):  Have been boosted OR  Completed the primary series of Pfizer or Moderna vaccine within the last 6 months OR  Completed the primary series of J&J vaccine within the last 2 months  Then:   Wear a mask around others for 10 days.  Test on day 5, if possible.  If you develop symptoms get a test and stay home.    If you/exposed  person(s):  Completed the primary series of Pfizer or Moderna vaccine over 6 months ago and are not boosted OR  Completed the primary series of J&J over 2 months ago and are not boosted OR  Are unvaccinated  Then:   Stay home for 5 days. After that continue to wear a mask around others for 5 additional days.  If you can't quarantine you must wear a mask for 10 days.  Test on day 5 if possible.  If you develop symptoms get a test and stay home        Josefina Claudio, CNP

## 2023-11-01 ENCOUNTER — TELEPHONE (OUTPATIENT)
Dept: URGENT CARE | Facility: CLINIC | Age: 63
End: 2023-11-01

## 2023-11-01 DIAGNOSIS — R11.0 NAUSEA: Primary | ICD-10-CM

## 2023-11-01 RX ORDER — ONDANSETRON 4 MG/1
4 TABLET, FILM COATED ORAL EVERY 8 HOURS PRN
Qty: 15 TABLET | Refills: 0 | Status: SHIPPED | OUTPATIENT
Start: 2023-11-01 | End: 2023-12-09 | Stop reason: ALTCHOICE

## 2023-11-01 NOTE — TELEPHONE ENCOUNTER
Spoke with patient. Informed him that Zofran has been sent into [facility] per his request. Also told him he may take and over the counter antihistamine for itching. He states understanding and will comply

## 2023-11-01 NOTE — TELEPHONE ENCOUNTER
Spoke with patient. He states he has had Dry Heaves today X's 2. He notes that he is consuming small amounts of fluid at a time. He did stay that when he ate an apple it helped. He would like Something for Nausea if possible. He also states that the rash on the arms and back has been itching a lot at night and wonders what he should do. He is not taking or Applying anything to the areas. Please Advise. Pharmacy is [facility].

## 2023-11-01 NOTE — TELEPHONE ENCOUNTER
Says he was in yesterday and diagnosed with covid. Saying he has dry heaves today.  Would like a nurse to call him.

## 2023-11-13 ENCOUNTER — ANCILLARY PROCEDURE (OUTPATIENT)
Dept: RADIOLOGY | Facility: CLINIC | Age: 63
End: 2023-11-13
Payer: MEDICARE

## 2023-11-13 ENCOUNTER — OFFICE VISIT (OUTPATIENT)
Dept: URGENT CARE | Facility: CLINIC | Age: 63
End: 2023-11-13
Payer: MEDICARE

## 2023-11-13 VITALS
WEIGHT: 315 LBS | TEMPERATURE: 98 F | SYSTOLIC BLOOD PRESSURE: 112 MMHG | HEART RATE: 74 BPM | OXYGEN SATURATION: 94 % | BODY MASS INDEX: 37.1 KG/M2 | RESPIRATION RATE: 18 BRPM | DIASTOLIC BLOOD PRESSURE: 67 MMHG

## 2023-11-13 DIAGNOSIS — R05.1 ACUTE COUGH: Primary | ICD-10-CM

## 2023-11-13 LAB
BASOPHILS # BLD AUTO: 0 10*3/UL
BASOPHILS NFR BLD AUTO: 0.6 % (ref 0–2)
EOSINOPHIL # BLD AUTO: 0.3 10*3/UL
EOSINOPHIL NFR BLD AUTO: 4 % (ref 0–3)
ERYTHROCYTE [DISTWIDTH] IN BLOOD BY AUTOMATED COUNT: 13 % (ref 11.5–15)
HCT VFR BLD AUTO: 42.1 % (ref 38–50)
HGB BLD-MCNC: 14.1 G/DL (ref 13.2–17.2)
LYMPHOCYTES # BLD AUTO: 1.5 10*3/UL
LYMPHOCYTES NFR BLD AUTO: 22.3 % (ref 15–47)
MCH RBC QN AUTO: 32.4 PG (ref 29–34)
MCHC RBC AUTO-ENTMCNC: 33.6 G/DL (ref 32–36)
MCV RBC AUTO: 96.5 FL (ref 82–97)
MONOCYTES # BLD AUTO: 0.6 10*3/UL
MONOCYTES NFR BLD AUTO: 9.3 % (ref 5–13)
NEUTROPHILS # BLD AUTO: 4.3 10*3/UL
NEUTROPHILS NFR BLD AUTO: 63.8 % (ref 46–70)
PLATELET # BLD AUTO: 215 10*3/UL (ref 130–350)
PMV BLD AUTO: 7.6 FL (ref 6.9–10.8)
RBC # BLD AUTO: 4.36 10*6/ΜL (ref 4.1–5.8)
WBC # BLD AUTO: 6.7 10*3/UL (ref 3.7–9.6)

## 2023-11-13 PROCEDURE — 71046 X-RAY EXAM CHEST 2 VIEWS: CPT

## 2023-11-13 PROCEDURE — 36415 COLL VENOUS BLD VENIPUNCTURE: CPT | Performed by: NURSE PRACTITIONER

## 2023-11-13 PROCEDURE — G0463 HOSPITAL OUTPT CLINIC VISIT: HCPCS | Performed by: NURSE PRACTITIONER

## 2023-11-13 PROCEDURE — 85025 COMPLETE CBC W/AUTO DIFF WBC: CPT | Performed by: NURSE PRACTITIONER

## 2023-11-13 PROCEDURE — 99214 OFFICE O/P EST MOD 30 MIN: CPT | Performed by: NURSE PRACTITIONER

## 2023-11-13 PROCEDURE — 71046 X-RAY EXAM CHEST 2 VIEWS: CPT | Mod: 26,CMS | Performed by: INTERNAL MEDICINE

## 2023-11-13 RX ORDER — BENZONATATE 100 MG/1
200 CAPSULE ORAL 3 TIMES DAILY PRN
Qty: 40 CAPSULE | Refills: 0 | Status: SHIPPED | OUTPATIENT
Start: 2023-11-13 | End: 2023-12-09 | Stop reason: ALTCHOICE

## 2023-11-13 ASSESSMENT — ENCOUNTER SYMPTOMS
HEADACHES: 0
SHORTNESS OF BREATH: 0
ABDOMINAL PAIN: 0
TROUBLE SWALLOWING: 0
CONSTIPATION: 0
DIARRHEA: 0
LIGHT-HEADEDNESS: 0
WHEEZING: 0
COUGH: 1
AGITATION: 0
FEVER: 0
VOMITING: 0
SORE THROAT: 0
ARTHRALGIAS: 0
NERVOUS/ANXIOUS: 0
CHEST TIGHTNESS: 0
RHINORRHEA: 0
CHILLS: 1
DIAPHORESIS: 1
FREQUENCY: 0
SINUS PRESSURE: 0
NAUSEA: 0
DIZZINESS: 0
SINUS PAIN: 0
WEAKNESS: 0
PALPITATIONS: 0
EYE PAIN: 0
DIFFICULTY URINATING: 0
MYALGIAS: 0

## 2023-11-13 NOTE — PROGRESS NOTES
Subjective      Brando Sexton is a 63 y.o. male who presents for cough.    Patient presents today with complaints of ongoing cough post-COVID.  He states that he has been having chills and sweats as well.  He denies any specific fever.        The following have been reviewed and updated as appropriate in this visit:   Tobacco  Allergies  Meds  Problems  Med Hx  Surg Hx  Fam Hx         Allergies   Allergen Reactions    Allopurinol     Cephalexin      Current Outpatient Medications   Medication Sig Dispense Refill    warfarin (COUMADIN) 1 mg tablet Take 1 tablet (1 mg total) by mouth daily      febuxostat (ULORIC) 80 mg tablet Take 1 tablet (80 mg total) by mouth daily 90 tablet 1    CARVEDILOL ORAL Take by mouth 2 (two) times a day Unknown mg      ascorbic acid (VITAMIN C ORAL) Take by mouth      lisinopril (PRINIVIL,ZESTRIL) 10 mg tablet Take 1 tablet(s) every day by oral route. 30 2    furosemide (LASIX) 20 mg tablet 2/day 180 3    warfarin (COUMADIN) 5 mg tablet 4 mg Alternates 2 mg with 4 mg every other day. 50 0    benzonatate (Tessalon Perles) 100 mg capsule Take 2 capsules (200 mg total) by mouth 3 (three) times a day as needed for cough 40 capsule 0    ondansetron (Zofran) 4 mg tablet Take 1 tablet (4 mg total) by mouth every 8 (eight) hours as needed for nausea or vomiting (Patient not taking: Reported on 11/13/2023) 15 tablet 0    colchicine 0.6 mg tablet Take 1 tablet (0.6 mg total) by mouth every 3 (three) days for 10 doses Take one tablet every three days. (Patient not taking: Reported on 11/13/2023) 10 tablet 0    colchicine 0.6 mg tablet Take 1 tablet (0.6 mg total) by mouth every other day (Patient not taking: Reported on 10/17/2023) 45 tablet 1    albuterol HFA (PROVENTIL HFA;VENTOLIN HFA) 90 mcg/actuation inhaler Inhale 2 puffs every 4 hours by inhalation route. 1 0     No current facility-administered medications for this visit.     Past Medical History:   Diagnosis Date    Atrial  fibrillation (CMS/HCC)     Gout     Psoriasis      Past Surgical History:   Procedure Laterality Date    CARDIOVERSION  03/2010    COLONOSCOPY  01/01/1995    ORTHOPEDIC SURGERY Bilateral     Knee Scope    OTHER SURGICAL HISTORY Right     Orthopedic:Rotator cuff x2 Right shoulder     Family History   Problem Relation Age of Onset    Heart disease Mother     Colon cancer Mother     Heart disease Father         MI at age 87    Other Father         Crohns    Psoriasis Father     Asthma Father     Diabetes type II Father's Sister     Heart disease Father's Brother      Social History     Socioeconomic History    Marital status:    Tobacco Use    Smoking status: Former    Smokeless tobacco: Former   Substance and Sexual Activity    Alcohol use: No     Social Determinants of Health     Tobacco Use: Medium Risk (11/13/2023)    Patient History     Smoking Tobacco Use: Former     Smokeless Tobacco Use: Former       Review of Systems   Constitutional:  Positive for chills and diaphoresis. Negative for fever.   HENT:  Positive for congestion. Negative for ear pain, rhinorrhea, sinus pressure, sinus pain, sore throat and trouble swallowing.    Eyes:  Negative for pain and visual disturbance.   Respiratory:  Positive for cough. Negative for chest tightness, shortness of breath and wheezing.    Cardiovascular:  Negative for chest pain and palpitations.   Gastrointestinal:  Negative for abdominal pain, constipation, diarrhea, nausea and vomiting.   Endocrine: Negative for cold intolerance and heat intolerance.   Genitourinary:  Negative for difficulty urinating, frequency and urgency.   Musculoskeletal:  Negative for arthralgias and myalgias.   Skin:  Negative for rash.   Neurological:  Negative for dizziness, weakness, light-headedness and headaches.   Psychiatric/Behavioral:  Negative for agitation. The patient is not nervous/anxious.        Objective   /67 (BP Location: Right arm, Patient Position: Sitting, Cuff  Size: Long Adult)   Pulse 74   Temp 36.7 °C (98 °F) (Temporal)   Resp 18   Wt (!) 145.6 kg (321 lb)   SpO2 94%   BMI 37.10 kg/m²   BP Readings from Last 3 Encounters:   11/13/23 112/67   10/31/23 134/82   10/22/23 129/68      Wt Readings from Last 3 Encounters:   11/13/23 (!) 145.6 kg (321 lb)   10/31/23 (!) 149.2 kg (329 lb)   10/22/23 (!) 147 kg (324 lb)      Pulse Readings from Last 3 Encounters:   11/13/23 74   10/31/23 63   10/22/23 67      Resp Readings from Last 3 Encounters:   11/13/23 18   10/31/23 18   10/17/23 18       Physical Exam  Vitals and nursing note reviewed.   Constitutional:       Appearance: Normal appearance. He is normal weight.   HENT:      Head: Normocephalic and atraumatic.      Right Ear: Tympanic membrane normal.      Left Ear: Tympanic membrane normal.      Nose: Nose normal.      Mouth/Throat:      Mouth: Mucous membranes are moist.   Eyes:      Extraocular Movements: Extraocular movements intact.      Pupils: Pupils are equal, round, and reactive to light.   Cardiovascular:      Rate and Rhythm: Normal rate and regular rhythm.      Pulses: Normal pulses.      Heart sounds: Normal heart sounds.   Pulmonary:      Effort: Pulmonary effort is normal.      Breath sounds: Normal breath sounds.   Abdominal:      General: Abdomen is flat. Bowel sounds are normal.      Palpations: Abdomen is soft.   Musculoskeletal:         General: Normal range of motion.      Cervical back: Normal range of motion and neck supple.   Lymphadenopathy:      Cervical: No cervical adenopathy.   Skin:     General: Skin is warm and dry.      Capillary Refill: Capillary refill takes less than 2 seconds.   Neurological:      General: No focal deficit present.      Mental Status: He is alert and oriented to person, place, and time.   Psychiatric:         Mood and Affect: Mood normal.         Behavior: Behavior normal.         Recent Results (from the past 24 hour(s))   CBC w/auto differential Blood, Venous     Collection Time: 11/13/23  7:36 AM   Result Value Ref Range    WBC 6.7 3.7 - 9.6 10*3/uL    RBC 4.36 4.10 - 5.80 10*6/µL    Hemoglobin 14.1 13.2 - 17.2 g/dL    Hematocrit 42.1 38.0 - 50.0 %    MCV 96.5 82.0 - 97.0 fL    MCH 32.4 29.0 - 34.0 pg    MCHC 33.6 32.0 - 36.0 g/dL    RDW 13.0 11.5 - 15.0 %    Platelets 215 130 - 350 10*3/uL    MPV 7.6 6.9 - 10.8 fL    Neutrophils% 63.8 46.0 - 70.0 %    Lymphocytes% 22.3 15.0 - 47.0 %    Monocytes% 9.3 5.0 - 13.0 %    Eosinophils% 4.0 (H) 0.0 - 3.0 %    Basophils% 0.6 0.0 - 2.0 %    ANC (auto diff) 4.30 10*3/UL    Lymphocytes Absolute 1.50 10*3/uL    Monocytes Absolute 0.60 10*3/uL    Eosinophils Absolute 0.30 10*3/uL    Basophils Absolute 0.00 10*3/uL       Assessment/Plan   Diagnoses and all orders for this visit:    Acute cough  -     X-ray chest 2 views  -     CBC w/auto differential Blood, Venous  -     benzonatate (Tessalon Perles) 100 mg capsule; Take 2 capsules (200 mg total) by mouth 3 (three) times a day as needed for cough    No acute abnormalities are noted on x-ray per my evaluation.  Radiologist review in agreement.  Patient continues to suffer from cough post-COVID.  He will continue with symptomatic management.  He is also given Tessalon Perles for his cough.  Return with any worsening symptoms or problems.    Patient Instructions   It is very important to drink plenty of fluids.  Drink small amounts frequently.       Josefina Claudio, CNP

## 2023-11-26 ENCOUNTER — OFFICE VISIT (OUTPATIENT)
Dept: URGENT CARE | Facility: CLINIC | Age: 63
End: 2023-11-26
Payer: MEDICARE

## 2023-11-26 ENCOUNTER — APPOINTMENT (OUTPATIENT)
Dept: CARDIOLOGY | Facility: CLINIC | Age: 63
End: 2023-11-26
Payer: MEDICARE

## 2023-11-26 ENCOUNTER — ANCILLARY PROCEDURE (OUTPATIENT)
Dept: RADIOLOGY | Facility: CLINIC | Age: 63
End: 2023-11-26
Payer: MEDICARE

## 2023-11-26 VITALS
BODY MASS INDEX: 36.75 KG/M2 | TEMPERATURE: 97.7 F | HEART RATE: 84 BPM | RESPIRATION RATE: 20 BRPM | WEIGHT: 315 LBS | OXYGEN SATURATION: 94 % | SYSTOLIC BLOOD PRESSURE: 137 MMHG | DIASTOLIC BLOOD PRESSURE: 63 MMHG

## 2023-11-26 DIAGNOSIS — R05.2 SUBACUTE COUGH: Primary | ICD-10-CM

## 2023-11-26 DIAGNOSIS — Z79.01 ANTICOAGULATED: ICD-10-CM

## 2023-11-26 DIAGNOSIS — J40 BRONCHITIS: ICD-10-CM

## 2023-11-26 LAB
ALBUMIN SERPL-MCNC: 3.8 G/DL (ref 3.5–5.3)
ALP SERPL-CCNC: 71 U/L (ref 45–115)
ALT SERPL-CCNC: 13 U/L (ref 7–52)
ANION GAP SERPL CALC-SCNC: 6 MMOL/L (ref 3–11)
AST SERPL-CCNC: 18 U/L
BASOPHILS # BLD AUTO: 0.1 10*3/UL
BASOPHILS NFR BLD AUTO: 1 % (ref 0–2)
BILIRUB SERPL-MCNC: 0.9 MG/DL (ref 0.2–1.4)
BUN SERPL-MCNC: 28 MG/DL (ref 7–25)
CALCIUM ALBUM COR SERPL-MCNC: 8.7 MG/DL (ref 8.6–10.3)
CALCIUM SERPL-MCNC: 8.5 MG/DL (ref 8.6–10.3)
CHLORIDE SERPL-SCNC: 109 MMOL/L (ref 98–107)
CO2 SERPL-SCNC: 24 MMOL/L (ref 21–32)
CREAT SERPL-MCNC: 1.28 MG/DL (ref 0.7–1.3)
CRP SERPL-MCNC: 3.9 MG/L
EGFRCR SERPLBLD CKD-EPI 2021: 62 ML/MIN/1.73M*2
EOSINOPHIL # BLD AUTO: 0.4 10*3/UL
EOSINOPHIL NFR BLD AUTO: 6 % (ref 0–3)
ERYTHROCYTE [DISTWIDTH] IN BLOOD BY AUTOMATED COUNT: 13.5 % (ref 11.5–15)
FIBRIN D-DIMER (NG/ML) IN PLATELET POOR PLASMA: 574 NG/MLFEU
GLUCOSE SERPL-MCNC: 104 MG/DL (ref 70–105)
HCT VFR BLD AUTO: 41.1 % (ref 38–50)
HGB BLD-MCNC: 13.8 G/DL (ref 13.2–17.2)
INR BLD: 1.8
LYMPHOCYTES # BLD AUTO: 1.4 10*3/UL
LYMPHOCYTES NFR BLD AUTO: 22.8 % (ref 15–47)
MCH RBC QN AUTO: 32.6 PG (ref 29–34)
MCHC RBC AUTO-ENTMCNC: 33.5 G/DL (ref 32–36)
MCV RBC AUTO: 97.3 FL (ref 82–97)
MONOCYTES # BLD AUTO: 0.6 10*3/UL
MONOCYTES NFR BLD AUTO: 9.9 % (ref 5–13)
NEUTROPHILS # BLD AUTO: 3.6 10*3/UL
NEUTROPHILS NFR BLD AUTO: 60.3 % (ref 46–70)
PLATELET # BLD AUTO: 207 10*3/UL (ref 130–350)
PMV BLD AUTO: 7.8 FL (ref 6.9–10.8)
POTASSIUM SERPL-SCNC: 4.5 MMOL/L (ref 3.5–5.1)
PROT SERPL-MCNC: 6.4 G/DL (ref 6–8.3)
PROTHROMBIN TIME: 21 SECONDS (ref 9.4–12.5)
RBC # BLD AUTO: 4.23 10*6/ΜL (ref 4.1–5.8)
SODIUM SERPL-SCNC: 139 MMOL/L (ref 135–145)
TROPONIN I SERPL-MCNC: 5.1 PG/ML
WBC # BLD AUTO: 6 10*3/UL (ref 3.7–9.6)

## 2023-11-26 PROCEDURE — 93005 ELECTROCARDIOGRAM TRACING: CPT | Performed by: PHYSICIAN ASSISTANT

## 2023-11-26 PROCEDURE — 80053 COMPREHEN METABOLIC PANEL: CPT | Performed by: PHYSICIAN ASSISTANT

## 2023-11-26 PROCEDURE — 71046 X-RAY EXAM CHEST 2 VIEWS: CPT

## 2023-11-26 PROCEDURE — G0463 HOSPITAL OUTPT CLINIC VISIT: HCPCS | Performed by: PHYSICIAN ASSISTANT

## 2023-11-26 PROCEDURE — 99214 OFFICE O/P EST MOD 30 MIN: CPT | Performed by: PHYSICIAN ASSISTANT

## 2023-11-26 PROCEDURE — 85379 FIBRIN DEGRADATION QUANT: CPT | Performed by: PHYSICIAN ASSISTANT

## 2023-11-26 PROCEDURE — 36415 COLL VENOUS BLD VENIPUNCTURE: CPT | Performed by: PHYSICIAN ASSISTANT

## 2023-11-26 PROCEDURE — 85025 COMPLETE CBC W/AUTO DIFF WBC: CPT | Performed by: PHYSICIAN ASSISTANT

## 2023-11-26 PROCEDURE — 93010 ELECTROCARDIOGRAM REPORT: CPT | Performed by: INTERNAL MEDICINE

## 2023-11-26 PROCEDURE — 71046 X-RAY EXAM CHEST 2 VIEWS: CPT | Mod: 26,CMS | Performed by: INTERNAL MEDICINE

## 2023-11-26 PROCEDURE — 85610 PROTHROMBIN TIME: CPT | Performed by: PHYSICIAN ASSISTANT

## 2023-11-26 PROCEDURE — 86140 C-REACTIVE PROTEIN: CPT | Performed by: PHYSICIAN ASSISTANT

## 2023-11-26 PROCEDURE — 84484 ASSAY OF TROPONIN QUANT: CPT | Performed by: PHYSICIAN ASSISTANT

## 2023-11-26 RX ORDER — AMOXICILLIN AND CLAVULANATE POTASSIUM 875; 125 MG/1; MG/1
1 TABLET, FILM COATED ORAL 2 TIMES DAILY
Qty: 20 TABLET | Refills: 0 | Status: SHIPPED | OUTPATIENT
Start: 2023-11-26 | End: 2023-12-19 | Stop reason: ALTCHOICE

## 2023-11-26 RX ORDER — BENZONATATE 100 MG/1
100 CAPSULE ORAL 3 TIMES DAILY PRN
Qty: 20 CAPSULE | Refills: 1 | Status: SHIPPED | OUTPATIENT
Start: 2023-11-26 | End: 2023-12-03

## 2023-11-26 ASSESSMENT — ENCOUNTER SYMPTOMS
VOMITING: 0
CHILLS: 0
EYE PAIN: 0
ABDOMINAL PAIN: 0
COUGH: 1
SEIZURES: 0
PALPITATIONS: 0
COLOR CHANGE: 0
SHORTNESS OF BREATH: 0
ARTHRALGIAS: 0
DYSURIA: 0
SORE THROAT: 0
HEMATURIA: 0
FEVER: 0
BACK PAIN: 0

## 2023-11-26 NOTE — PROGRESS NOTES
Subjective      Brando Sexton is a 63 y.o. male who presents for cough for the last 2 weeks    HPI  Patient has had a cough for the last 4 weeks.  He presented initially on 10/31/2023 for symptoms.  He is positive for COVID-19.  Due to being on blood thinners he was not offered the Paxlovid treatment.  Infusion was discussed.  He declined.  Patient return to the clinic on 11/13.  Chest x-ray was done and was negative.  He continued to have symptoms of cough.  White blood cell count was normal.  He was given Tessalon Perles for cough.  The following have been reviewed and updated as appropriate in this visit:          Allergies   Allergen Reactions    Allopurinol     Cephalexin      Current Outpatient Medications   Medication Sig Dispense Refill    warfarin (COUMADIN) 1 mg tablet Take 1 tablet (1 mg total) by mouth daily      febuxostat (ULORIC) 80 mg tablet Take 1 tablet (80 mg total) by mouth daily 90 tablet 1    CARVEDILOL ORAL Take by mouth 2 (two) times a day Unknown mg      ascorbic acid (VITAMIN C ORAL) Take by mouth      lisinopril (PRINIVIL,ZESTRIL) 10 mg tablet Take 1 tablet(s) every day by oral route. 30 2    furosemide (LASIX) 20 mg tablet 2/day 180 3    warfarin (COUMADIN) 5 mg tablet 4 mg Alternates 2 mg with 4 mg every other day. 50 0    albuterol HFA (PROVENTIL HFA;VENTOLIN HFA) 90 mcg/actuation inhaler Inhale 2 puffs every 4 hours by inhalation route. 1 0    benzonatate (Tessalon Perles) 100 mg capsule Take 1 capsule (100 mg total) by mouth 3 (three) times a day as needed for cough for up to 7 days 20 capsule 1    amoxicillin-pot clavulanate (Augmentin) 875-125 mg per tablet Take 1 tablet by mouth 2 (two) times a day for 10 days 20 tablet 0    benzonatate (Tessalon Perles) 100 mg capsule Take 2 capsules (200 mg total) by mouth 3 (three) times a day as needed for cough (Patient not taking: Reported on 11/26/2023) 40 capsule 0    ondansetron (Zofran) 4 mg tablet Take 1 tablet (4 mg total) by  mouth every 8 (eight) hours as needed for nausea or vomiting (Patient not taking: Reported on 11/13/2023) 15 tablet 0    colchicine 0.6 mg tablet Take 1 tablet (0.6 mg total) by mouth every 3 (three) days for 10 doses Take one tablet every three days. (Patient not taking: Reported on 11/13/2023) 10 tablet 0    colchicine 0.6 mg tablet Take 1 tablet (0.6 mg total) by mouth every other day (Patient not taking: Reported on 10/17/2023) 45 tablet 1     No current facility-administered medications for this visit.     Past Medical History:   Diagnosis Date    Atrial fibrillation (CMS/HCC)     Gout     Psoriasis      Past Surgical History:   Procedure Laterality Date    CARDIOVERSION  03/2010    COLONOSCOPY  01/01/1995    ORTHOPEDIC SURGERY Bilateral     Knee Scope    OTHER SURGICAL HISTORY Right     Orthopedic:Rotator cuff x2 Right shoulder     Family History   Problem Relation Age of Onset    Heart disease Mother     Colon cancer Mother     Heart disease Father         MI at age 87    Other Father         Crohns    Psoriasis Father     Asthma Father     Diabetes type II Father's Sister     Heart disease Father's Brother      Social History     Socioeconomic History    Marital status:    Tobacco Use    Smoking status: Former    Smokeless tobacco: Former   Substance and Sexual Activity    Alcohol use: No     Social Determinants of Health     Tobacco Use: Medium Risk (11/13/2023)    Patient History     Smoking Tobacco Use: Former     Smokeless Tobacco Use: Former       Review of Systems   Constitutional:  Negative for chills and fever.   HENT:  Negative for ear pain and sore throat.    Eyes:  Negative for pain and visual disturbance.   Respiratory:  Positive for cough. Negative for shortness of breath.    Cardiovascular:  Negative for chest pain and palpitations.   Gastrointestinal:  Negative for abdominal pain and vomiting.   Genitourinary:  Negative for dysuria and hematuria.   Musculoskeletal:  Negative for  arthralgias and back pain.   Skin:  Negative for color change and rash.   Neurological:  Negative for seizures and syncope.   All other systems reviewed and are negative.      Objective   /63 (BP Location: Right arm, Patient Position: Sitting)   Pulse 84   Temp 36.5 °C (97.7 °F) (Temporal)   Resp 20   Wt (!) 144.2 kg (318 lb)   SpO2 94%   BMI 36.75 kg/m²     Physical Exam  Vitals and nursing note reviewed.   Constitutional:       Appearance: Normal appearance. He is obese.   HENT:      Head: Normocephalic.      Right Ear: Tympanic membrane, ear canal and external ear normal.      Left Ear: Tympanic membrane, ear canal and external ear normal.      Nose: Nose normal.      Mouth/Throat:      Mouth: Mucous membranes are moist.      Pharynx: Oropharynx is clear.   Eyes:      Extraocular Movements: Extraocular movements intact.      Conjunctiva/sclera: Conjunctivae normal.      Pupils: Pupils are equal, round, and reactive to light.   Cardiovascular:      Rate and Rhythm: Normal rate. Rhythm irregular.      Heart sounds: Normal heart sounds.   Pulmonary:      Breath sounds: Normal breath sounds.   Abdominal:      General: Abdomen is flat.   Musculoskeletal:      Cervical back: Normal range of motion. No rigidity.   Lymphadenopathy:      Cervical: No cervical adenopathy.   Skin:     General: Skin is warm.   Neurological:      General: No focal deficit present.      Mental Status: He is alert.         Recent Results (from the past 24 hour(s))   D-dimer, quantitative Blood, Venous    Collection Time: 11/26/23  7:47 AM   Result Value Ref Range    D-Dimer, Quant (ng/mL) 574 (H) <501 NG/MLFEU   HS Troponin I    Collection Time: 11/26/23  7:47 AM   Result Value Ref Range    hsTnI 0 Hour 5.1 <=19.8 pg/mL   Comprehensive metabolic panel Blood, Venous    Collection Time: 11/26/23  7:47 AM   Result Value Ref Range    Sodium 139 135 - 145 mmol/L    Potassium 4.5 3.5 - 5.1 MMOL/L    Chloride 109 (H) 98 - 107 mmol/L    CO2  "24 21 - 32 mmol/L    Anion Gap 6 3 - 11 mmol/L    BUN 28 (H) 7 - 25 mg/dL    Creatinine 1.28 0.70 - 1.30 mg/dL    Glucose 104 70 - 105 mg/dL    Calcium 8.5 (L) 8.6 - 10.3 mg/dL    AST 18 <40 U/L    ALT (SGPT) 13 7 - 52 U/L    Alkaline Phosphatase 71 45 - 115 U/L    Total Protein 6.4 6.0 - 8.3 g/dL    Albumin 3.8 3.5 - 5.3 g/dL    Total Bilirubin 0.90 0.20 - 1.40 mg/dL    Corrected Calcium 8.7 8.6 - 10.3 mg/dL    eGFR 62 >60 mL/min/1.73m*2   CBC w/auto differential Blood, Venous    Collection Time: 11/26/23  7:47 AM   Result Value Ref Range    WBC 6.0 3.7 - 9.6 10*3/uL    RBC 4.23 4.10 - 5.80 10*6/µL    Hemoglobin 13.8 13.2 - 17.2 g/dL    Hematocrit 41.1 38.0 - 50.0 %    MCV 97.3 (H) 82.0 - 97.0 fL    MCH 32.6 29.0 - 34.0 pg    MCHC 33.5 32.0 - 36.0 g/dL    RDW 13.5 11.5 - 15.0 %    Platelets 207 130 - 350 10*3/uL    MPV 7.8 6.9 - 10.8 fL    Neutrophils% 60.3 46.0 - 70.0 %    Lymphocytes% 22.8 15.0 - 47.0 %    Monocytes% 9.9 5.0 - 13.0 %    Eosinophils% 6.0 (H) 0.0 - 3.0 %    Basophils% 1.0 0.0 - 2.0 %    ANC (auto diff) 3.60 10*3/UL    Lymphocytes Absolute 1.40 10*3/uL    Monocytes Absolute 0.60 10*3/uL    Eosinophils Absolute 0.40 10*3/uL    Basophils Absolute 0.10 10*3/uL   C-reactive protein (Inflammation) Blood, Venous    Collection Time: 11/26/23  7:47 AM   Result Value Ref Range    CRP 3.9 <=10.0 MG/L   Protime-INR Blood, Venous    Collection Time: 11/26/23  7:47 AM   Result Value Ref Range    Protime 21.0 (H) 9.4 - 12.5 SECONDS    INR 1.8 (H) <=1.1     Atrial fibrillation with a rate of 68.  Q waves in leads II, III and aVF.  Last EKG was in 2012.  No \"Q waves at that point    Chest x-ray with no acute.  Reported by [facility].  Assessment/Plan   Patient has been ill for the last month.  Did not have treatment for COVID-19.  Concerning for COVID complications.  History of atrial fibrillation on Coumadin.  Will check an INR, troponin, EKG, chest x-ray and D-dimer.  Discussed that postviral you can have a cough up to " 6 weeks    Age-adjusted D-dimer is 644 cutoff.  Unlikely for PE.    Troponin is negative.  Will treat him for bronchitis.  He has close follow-up with PCP upcoming.  Also given some Tessalon Perles  Diagnoses and all orders for this visit:    Subacute cough  -     X-ray chest 2 views  -     ECG 12 lead  -     D-dimer, quantitative Blood, Venous  -     HS Troponin I  -     Comprehensive metabolic panel Blood, Venous  -     CBC w/auto differential Blood, Venous  -     C-reactive protein (Inflammation) Blood, Venous    Anticoagulated  -     Protime-INR Blood, Venous    Bronchitis  -     benzonatate (Tessalon Perles) 100 mg capsule; Take 1 capsule (100 mg total) by mouth 3 (three) times a day as needed for cough for up to 7 days  -     amoxicillin-pot clavulanate (Augmentin) 875-125 mg per tablet; Take 1 tablet by mouth 2 (two) times a day for 10 days        Patient Instructions   Testing today reveals a normal white blood cell count.  Test for blood clot is unlikely and negative when age-adjusted. Test for heart inflammation is negative.  You were given Tessalon Perles and a refill to help with your cough.  Due to the length of your illness were going to treat you for bronchitis.  You were prescribed Augmentin for the next 10 days.  You might like take a probiotic with this as it may cause some diarrhea.  Is important to have close follow-up with your physician if you are not improving from COVID-19 and post-COVID 19 bronchitis.    Your INR is slightly low today at 1.8.  Antibiotics sometimes to affect this.  It is advised that you test this in another 4 days and at the end of your antibiotic treatment.  Follow-up with your regular provider for alterations in your Coumadin dosing.    TONO Gordillo

## 2023-11-26 NOTE — PATIENT INSTRUCTIONS
Testing today reveals a normal white blood cell count.  Test for blood clot is unlikely and negative when age-adjusted. Test for heart inflammation is negative.  You were given Tessalon Perles and a refill to help with your cough.  Due to the length of your illness were going to treat you for bronchitis.  You were prescribed Augmentin for the next 10 days.  You might like take a probiotic with this as it may cause some diarrhea.  Is important to have close follow-up with your physician if you are not improving from COVID-19 and post-COVID 19 bronchitis.    Your INR is slightly low today at 1.8.  Antibiotics sometimes to affect this.  It is advised that you test this in another 4 days and at the end of your antibiotic treatment.  Follow-up with your regular provider for alterations in your Coumadin dosing.

## 2023-12-09 ENCOUNTER — OFFICE VISIT (OUTPATIENT)
Dept: URGENT CARE | Facility: CLINIC | Age: 63
End: 2023-12-09
Payer: MEDICARE

## 2023-12-09 VITALS
OXYGEN SATURATION: 95 % | TEMPERATURE: 97.8 F | DIASTOLIC BLOOD PRESSURE: 92 MMHG | RESPIRATION RATE: 20 BRPM | SYSTOLIC BLOOD PRESSURE: 129 MMHG | BODY MASS INDEX: 36.71 KG/M2 | WEIGHT: 315 LBS | HEART RATE: 82 BPM

## 2023-12-09 DIAGNOSIS — L24.9 IRRITANT CONTACT DERMATITIS, UNSPECIFIED TRIGGER: Primary | ICD-10-CM

## 2023-12-09 PROCEDURE — G0463 HOSPITAL OUTPT CLINIC VISIT: HCPCS | Performed by: NURSE PRACTITIONER

## 2023-12-09 PROCEDURE — 99213 OFFICE O/P EST LOW 20 MIN: CPT | Performed by: NURSE PRACTITIONER

## 2023-12-09 RX ORDER — TRIAMCINOLONE ACETONIDE 1 MG/G
1 CREAM TOPICAL 2 TIMES DAILY
Qty: 30 G | Refills: 0 | Status: SHIPPED | OUTPATIENT
Start: 2023-12-09 | End: 2023-12-19 | Stop reason: ALTCHOICE

## 2023-12-09 ASSESSMENT — ENCOUNTER SYMPTOMS
DIZZINESS: 0
SHORTNESS OF BREATH: 0
TROUBLE SWALLOWING: 0
WEAKNESS: 0
CHILLS: 0
HEADACHES: 0
VOMITING: 0
LIGHT-HEADEDNESS: 0
AGITATION: 0
DIARRHEA: 0
FREQUENCY: 0
SINUS PRESSURE: 0
ABDOMINAL PAIN: 0
FEVER: 0
NERVOUS/ANXIOUS: 1
CONSTIPATION: 0
ARTHRALGIAS: 0
SORE THROAT: 0
DIFFICULTY URINATING: 0
CHEST TIGHTNESS: 0
MYALGIAS: 0
COUGH: 0
RHINORRHEA: 0
PALPITATIONS: 0
NAUSEA: 0
SINUS PAIN: 0
EYE PAIN: 0
WHEEZING: 0

## 2023-12-09 ASSESSMENT — PAIN SCALES - GENERAL: PAINLEVEL: 0-NO PAIN

## 2023-12-09 NOTE — PATIENT INSTRUCTIONS
You are instructed to wear clean dry close daily.  I encourage you to take over-the-counter Zyrtec 10 mg daily for itching.  You can also take Benadryl 50 mg as needed for itching every 4-6 hours if needed.

## 2023-12-09 NOTE — PROGRESS NOTES
Subjective      Brando Sexton is a 63 y.o. male who presents for itching.    Patient presents today with complaints of rash or itching to his forearms and an area on his right upper thigh.  He states this started after he wore a turtleneck shirt.  He reports that he took some Benadryl and this seemed to help however the itching did return.        The following have been reviewed and updated as appropriate in this visit:   Tobacco  Allergies  Meds  Problems  Med Hx  Surg Hx  Fam Hx         Allergies   Allergen Reactions    Allopurinol     Cephalexin      Current Outpatient Medications   Medication Sig Dispense Refill    warfarin (COUMADIN) 1 mg tablet Take 1 tablet (1 mg total) by mouth daily      febuxostat (ULORIC) 80 mg tablet Take 1 tablet (80 mg total) by mouth daily 90 tablet 1    CARVEDILOL ORAL Take by mouth 2 (two) times a day Unknown mg      ascorbic acid (VITAMIN C ORAL) Take by mouth      lisinopril (PRINIVIL,ZESTRIL) 10 mg tablet Take 1 tablet(s) every day by oral route. 30 2    furosemide (LASIX) 20 mg tablet 2/day 180 3    warfarin (COUMADIN) 5 mg tablet 4 mg Alternates 2 mg with 4 mg every other day. 50 0    albuterol HFA (PROVENTIL HFA;VENTOLIN HFA) 90 mcg/actuation inhaler Inhale 2 puffs every 4 hours by inhalation route. 1 0    triamcinolone (KENALOG) 0.1 % cream Apply 1 Application topically 2 (two) times a day 30 g 0     No current facility-administered medications for this visit.     Past Medical History:   Diagnosis Date    Atrial fibrillation (CMS/HCC)     Gout     Psoriasis      Past Surgical History:   Procedure Laterality Date    CARDIOVERSION  03/2010    COLONOSCOPY  01/01/1995    ORTHOPEDIC SURGERY Bilateral     Knee Scope    OTHER SURGICAL HISTORY Right     Orthopedic:Rotator cuff x2 Right shoulder     Family History   Problem Relation Age of Onset    Heart disease Mother     Colon cancer Mother     Heart disease Father         MI at age 87    Other Father         Crohns     Psoriasis Father     Asthma Father     Diabetes type II Father's Sister     Heart disease Father's Brother      Social History     Socioeconomic History    Marital status:    Tobacco Use    Smoking status: Former    Smokeless tobacco: Former   Substance and Sexual Activity    Alcohol use: No     Social Determinants of Health     Tobacco Use: Medium Risk (11/13/2023)    Patient History     Smoking Tobacco Use: Former     Smokeless Tobacco Use: Former       Review of Systems   Constitutional:  Negative for chills and fever.   HENT:  Negative for congestion, ear pain, rhinorrhea, sinus pressure, sinus pain, sore throat and trouble swallowing.    Eyes:  Negative for pain and visual disturbance.   Respiratory:  Negative for cough, chest tightness, shortness of breath and wheezing.    Cardiovascular:  Negative for chest pain and palpitations.   Gastrointestinal:  Negative for abdominal pain, constipation, diarrhea, nausea and vomiting.   Endocrine: Negative for cold intolerance and heat intolerance.   Genitourinary:  Negative for difficulty urinating, frequency and urgency.   Musculoskeletal:  Negative for arthralgias and myalgias.   Skin:  Positive for rash (Itching to the forearms and the right upper thigh).   Neurological:  Negative for dizziness, weakness, light-headedness and headaches.   Psychiatric/Behavioral:  Negative for agitation. The patient is nervous/anxious.        Objective   /92 (BP Location: Left arm, Patient Position: Sitting, Cuff Size: Large Adult)   Pulse 82   Temp 36.6 °C (97.8 °F) (Temporal)   Resp 20   Wt (!) 144.1 kg (317 lb 11.2 oz)   SpO2 95%   BMI 36.71 kg/m²   BP Readings from Last 3 Encounters:   12/09/23 129/92   11/26/23 137/63   11/13/23 112/67      Wt Readings from Last 3 Encounters:   12/09/23 (!) 144.1 kg (317 lb 11.2 oz)   11/26/23 (!) 144.2 kg (318 lb)   11/13/23 (!) 145.6 kg (321 lb)      Pulse Readings from Last 3 Encounters:   12/09/23 82   11/26/23 84    11/13/23 74      Resp Readings from Last 3 Encounters:   12/09/23 20   11/26/23 20   11/13/23 18       Physical Exam  Vitals and nursing note reviewed.   Constitutional:       Appearance: Normal appearance. He is obese.   HENT:      Head: Normocephalic and atraumatic.      Right Ear: Tympanic membrane normal.      Left Ear: Tympanic membrane normal.      Nose: Nose normal.      Mouth/Throat:      Mouth: Mucous membranes are moist.   Eyes:      Extraocular Movements: Extraocular movements intact.      Pupils: Pupils are equal, round, and reactive to light.   Cardiovascular:      Rate and Rhythm: Normal rate and regular rhythm.      Pulses: Normal pulses.      Heart sounds: Normal heart sounds.   Pulmonary:      Effort: Pulmonary effort is normal.      Breath sounds: Normal breath sounds.   Abdominal:      General: Abdomen is flat. Bowel sounds are normal.      Palpations: Abdomen is soft.   Musculoskeletal:         General: Normal range of motion.      Cervical back: Normal range of motion and neck supple.   Lymphadenopathy:      Cervical: No cervical adenopathy.   Skin:     General: Skin is warm and dry.      Capillary Refill: Capillary refill takes less than 2 seconds.      Findings: Rash (faint rash to bilateral forearms and right upper thigh) present.   Neurological:      General: No focal deficit present.      Mental Status: He is alert and oriented to person, place, and time.   Psychiatric:         Mood and Affect: Mood normal.         Behavior: Behavior normal.         No results found for this or any previous visit (from the past 24 hour(s)).    Assessment/Plan   Diagnoses and all orders for this visit:    Irritant contact dermatitis, unspecified trigger  -     triamcinolone (KENALOG) 0.1 % cream; Apply 1 Application topically 2 (two) times a day    Patient is given some triamcinolone cream and encouraged to use over-the-counter antihistamines.  He does have an upcoming appointment with his primary care  provider in 10 days.    Patient Instructions   You are instructed to wear clean dry close daily.  I encourage you to take over-the-counter Zyrtec 10 mg daily for itching.  You can also take Benadryl 50 mg as needed for itching every 4-6 hours if needed.    Josefina Claudio, CNP

## 2023-12-11 ENCOUNTER — TELEPHONE (OUTPATIENT)
Dept: RHEUMATOLOGY | Facility: CLINIC | Age: 63
End: 2023-12-11

## 2023-12-11 NOTE — TELEPHONE ENCOUNTER
Called pt, read provider's notes verbatim, pt voiced understanding and has no questions or concerns.

## 2023-12-11 NOTE — TELEPHONE ENCOUNTER
Caller would like to discuss (a) medication Writer has advised caller of a callback from within 24 hours.    Patient: Brando Sexton    Caller Name (Last and first, relation/role): self    Name of Facility: [facility]    Callback Number: [phone]    Best Availability: any    Fax Number: [facility]    Additional Info: thought he didn't take of his uloric took 2 instead of one needs advisement on effects of this     Did you confirm the message with the caller: Yes    Is it okay that the nurse communicates your response through MyChart? No

## 2023-12-11 NOTE — TELEPHONE ENCOUNTER
Accidentally took 160 mg today of Uloric- took one about 4 hours ago and took another on about half an hour ago by accident.   Showing no s/s. Wants to know if he needs to anything as far as not take one tomorrow or call you or urgent care if he does show any s/s

## 2023-12-19 ENCOUNTER — ANTICOAGULATION VISIT (OUTPATIENT)
Dept: PHARMACY | Facility: HOSPITAL | Age: 63
End: 2023-12-19
Payer: MEDICARE

## 2023-12-19 ENCOUNTER — TELEPHONE (OUTPATIENT)
Dept: FAMILY MEDICINE | Facility: CLINIC | Age: 63
End: 2023-12-19

## 2023-12-19 ENCOUNTER — OFFICE VISIT (OUTPATIENT)
Dept: FAMILY MEDICINE | Facility: CLINIC | Age: 63
End: 2023-12-19
Payer: MEDICARE

## 2023-12-19 VITALS
DIASTOLIC BLOOD PRESSURE: 82 MMHG | SYSTOLIC BLOOD PRESSURE: 138 MMHG | BODY MASS INDEX: 37.19 KG/M2 | WEIGHT: 315 LBS | TEMPERATURE: 97.9 F | OXYGEN SATURATION: 92 % | HEART RATE: 85 BPM | HEIGHT: 77 IN | RESPIRATION RATE: 22 BRPM

## 2023-12-19 DIAGNOSIS — I10 ESSENTIAL HYPERTENSION: ICD-10-CM

## 2023-12-19 DIAGNOSIS — M10.9 GOUT OF MULTIPLE SITES, UNSPECIFIED CAUSE, UNSPECIFIED CHRONICITY: ICD-10-CM

## 2023-12-19 DIAGNOSIS — I48.19 PERSISTENT ATRIAL FIBRILLATION (CMS/HCC): Primary | Chronic | ICD-10-CM

## 2023-12-19 DIAGNOSIS — Z79.899 MEDICATION MANAGEMENT: ICD-10-CM

## 2023-12-19 DIAGNOSIS — R73.9 HYPERGLYCEMIA: ICD-10-CM

## 2023-12-19 DIAGNOSIS — I48.19 PERSISTENT ATRIAL FIBRILLATION (CMS/HCC): ICD-10-CM

## 2023-12-19 DIAGNOSIS — Z00.00 MEDICARE ANNUAL WELLNESS VISIT, INITIAL: ICD-10-CM

## 2023-12-19 DIAGNOSIS — Z23 NEED FOR VACCINATION: Primary | ICD-10-CM

## 2023-12-19 DIAGNOSIS — L73.9 FOLLICULITIS: ICD-10-CM

## 2023-12-19 PROBLEM — G45.9 TRANSIENT ISCHEMIC ATTACK: Chronic | Status: ACTIVE | Noted: 2017-11-02

## 2023-12-19 PROBLEM — N40.0 BENIGN PROSTATIC HYPERTROPHY WITHOUT URINARY OBSTRUCTION: Chronic | Status: ACTIVE | Noted: 2023-10-17

## 2023-12-19 PROBLEM — K21.9 GASTROESOPHAGEAL REFLUX DISEASE: Chronic | Status: ACTIVE | Noted: 2023-10-17

## 2023-12-19 PROBLEM — G45.9 TRANSIENT ISCHEMIC ATTACK: Chronic | Status: RESOLVED | Noted: 2017-11-02 | Resolved: 2023-12-19

## 2023-12-19 PROBLEM — S89.90XA INJURY OF KNEE: Status: RESOLVED | Noted: 2023-10-17 | Resolved: 2023-12-19

## 2023-12-19 PROBLEM — U07.1 COVID-19: Status: RESOLVED | Noted: 2022-01-14 | Resolved: 2023-12-19

## 2023-12-19 PROBLEM — R51.9 HEADACHE: Status: RESOLVED | Noted: 2023-10-17 | Resolved: 2023-12-19

## 2023-12-19 PROCEDURE — G0439 PPPS, SUBSEQ VISIT: HCPCS | Performed by: FAMILY MEDICINE

## 2023-12-19 PROCEDURE — G0463 HOSPITAL OUTPT CLINIC VISIT: HCPCS | Performed by: FAMILY MEDICINE

## 2023-12-19 RX ORDER — DOXYCYCLINE 100 MG/1
100 CAPSULE ORAL 2 TIMES DAILY
Qty: 14 CAPSULE | Refills: 0 | Status: SHIPPED | OUTPATIENT
Start: 2023-12-19 | End: 2023-12-26

## 2023-12-19 RX ORDER — FUROSEMIDE 20 MG/1
20 TABLET ORAL DAILY
Qty: 90 TABLET | Refills: 3 | Status: SHIPPED | OUTPATIENT
Start: 2023-12-19 | End: 2024-12-19

## 2023-12-19 RX ORDER — FEBUXOSTAT 80 MG/1
80 TABLET, FILM COATED ORAL DAILY
Qty: 90 TABLET | Refills: 3 | Status: SHIPPED | OUTPATIENT
Start: 2023-12-19 | End: 2024-08-14 | Stop reason: SDUPTHER

## 2023-12-19 RX ORDER — CARVEDILOL 3.12 MG/1
3.12 TABLET ORAL 2 TIMES DAILY
Qty: 180 TABLET | Refills: 3 | Status: SHIPPED | OUTPATIENT
Start: 2023-12-19 | End: 2024-03-19

## 2023-12-19 RX ORDER — WARFARIN SODIUM 5 MG/1
TABLET ORAL
Qty: 90 TABLET | Refills: 3 | Status: SHIPPED | OUTPATIENT
Start: 2023-12-19 | End: 2023-12-19 | Stop reason: ALTCHOICE

## 2023-12-19 RX ORDER — WARFARIN 1 MG/1
1 TABLET ORAL
Qty: 90 TABLET | Refills: 3 | Status: SHIPPED | OUTPATIENT
Start: 2023-12-19 | End: 2023-12-19 | Stop reason: ALTCHOICE

## 2023-12-19 RX ORDER — LISINOPRIL 10 MG/1
10 TABLET ORAL DAILY
Qty: 90 TABLET | Refills: 3 | Status: SHIPPED | OUTPATIENT
Start: 2023-12-19 | End: 2024-03-19 | Stop reason: ALTCHOICE

## 2023-12-19 SDOH — HEALTH STABILITY: MENTAL HEALTH
DO YOU FEEL STRESS - TENSE, RESTLESS, NERVOUS, OR ANXIOUS, OR UNABLE TO SLEEP AT NIGHT BECAUSE YOUR MIND IS TROUBLED ALL THE TIME - THESE DAYS?: NOT AT ALL

## 2023-12-19 SDOH — SOCIAL STABILITY: SOCIAL INSECURITY: ARE YOU MARRIED, WIDOWED, DIVORCED, SEPARATED, NEVER MARRIED, OR LIVING WITH A PARTNER?: MARRIED

## 2023-12-19 SDOH — SOCIAL STABILITY: SOCIAL NETWORK: IN A TYPICAL WEEK, HOW MANY TIMES DO YOU TALK ON THE PHONE WITH FAMILY, FRIENDS, OR NEIGHBORS?: PATIENT DECLINED

## 2023-12-19 SDOH — HEALTH STABILITY: MENTAL HEALTH: HOW MANY DRINKS CONTAINING ALCOHOL DO YOU HAVE ON A TYPICAL DAY WHEN YOU ARE DRINKING?: PATIENT DOES NOT DRINK

## 2023-12-19 SDOH — SOCIAL STABILITY: SOCIAL INSECURITY: WITHIN THE LAST YEAR, HAVE YOU BEEN AFRAID OF YOUR PARTNER OR EX-PARTNER?: PATIENT DECLINED

## 2023-12-19 SDOH — ECONOMIC STABILITY: TRANSPORTATION INSECURITY: IN THE PAST 12 MONTHS, HAS LACK OF TRANSPORTATION KEPT YOU FROM MEDICAL APPOINTMENTS OR FROM GETTING MEDICATIONS?: NO

## 2023-12-19 SDOH — ECONOMIC STABILITY: HOUSING INSECURITY: IN THE LAST 12 MONTHS, WAS THERE A TIME WHEN YOU WERE NOT ABLE TO PAY THE MORTGAGE OR RENT ON TIME?: PATIENT REFUSED

## 2023-12-19 SDOH — SOCIAL STABILITY: SOCIAL INSECURITY
WITHIN THE LAST YEAR, HAVE YOU BEEN RAPED OR FORCED TO HAVE ANY KIND OF SEXUAL ACTIVITY BY YOUR PARTNER OR EX-PARTNER?: NO

## 2023-12-19 SDOH — ECONOMIC STABILITY: FOOD INSECURITY: WITHIN THE PAST 12 MONTHS, THE FOOD YOU BOUGHT JUST DIDN'T LAST AND YOU DIDN'T HAVE MONEY TO GET MORE.: PATIENT DECLINED

## 2023-12-19 SDOH — HEALTH STABILITY: MENTAL HEALTH: HOW OFTEN DO YOU HAVE A DRINK CONTAINING ALCOHOL?: NEVER

## 2023-12-19 SDOH — SOCIAL STABILITY: SOCIAL NETWORK: HOW OFTEN DO YOU ATTEND CHURCH OR RELIGIOUS SERVICES?: PATIENT DECLINED

## 2023-12-19 SDOH — HEALTH STABILITY: PHYSICAL HEALTH: ON AVERAGE, HOW MANY DAYS PER WEEK DO YOU ENGAGE IN MODERATE TO STRENUOUS EXERCISE (LIKE A BRISK WALK)?: 1 DAY

## 2023-12-19 SDOH — SOCIAL STABILITY: SOCIAL NETWORK: HOW OFTEN DO YOU GET TOGETHER WITH FRIENDS OR RELATIVES?: PATIENT DECLINED

## 2023-12-19 SDOH — ECONOMIC STABILITY: FOOD INSECURITY
WITHIN THE PAST 12 MONTHS, YOU WORRIED THAT YOUR FOOD WOULD RUN OUT BEFORE YOU GOT THE MONEY TO BUY MORE.: PATIENT DECLINED

## 2023-12-19 SDOH — ECONOMIC STABILITY: FOOD INSECURITY: HOW HARD IS IT FOR YOU TO PAY FOR THE VERY BASICS LIKE FOOD, HOUSING, MEDICAL CARE, AND HEATING?: PATIENT DECLINED

## 2023-12-19 SDOH — SOCIAL STABILITY: SOCIAL NETWORK
DO YOU BELONG TO ANY CLUBS OR ORGANIZATIONS SUCH AS CHURCH GROUPS, UNIONS, FRATERNAL OR ATHLETIC GROUPS, OR SCHOOL GROUPS?: PATIENT DECLINED

## 2023-12-19 SDOH — ECONOMIC STABILITY: INCOME INSECURITY
IN THE PAST 12 MONTHS HAS THE ELECTRIC, GAS, OIL, OR WATER COMPANY THREATENED TO SHUT OFF SERVICES IN YOUR HOME?: PATIENT REFUSED

## 2023-12-19 SDOH — SOCIAL STABILITY: SOCIAL NETWORK: HOW OFTEN DO YOU ATTEND MEETINGS OF THE CLUBS OR ORGANIZATIONS YOU BELONG TO?: NEVER

## 2023-12-19 SDOH — SOCIAL STABILITY: SOCIAL INSECURITY
WITHIN THE LAST YEAR, HAVE YOU BEEN HUMILIATED OR EMOTIONALLY ABUSED IN OTHER WAYS BY YOUR PARTNER OR EX-PARTNER?: PATIENT DECLINED

## 2023-12-19 SDOH — ECONOMIC STABILITY: HOUSING INSECURITY
IN THE LAST 12 MONTHS, WAS THERE A TIME WHEN YOU DID NOT HAVE A STEADY PLACE TO SLEEP OR SLEPT IN A SHELTER (INCLUDING NOW)?: PATIENT REFUSED

## 2023-12-19 SDOH — HEALTH STABILITY: MENTAL HEALTH: HOW OFTEN DO YOU HAVE SIX OR MORE DRINKS ON ONE OCCASION?: PATIENT DECLINED

## 2023-12-19 SDOH — HEALTH STABILITY: PHYSICAL HEALTH: ON AVERAGE, HOW MANY MINUTES DO YOU ENGAGE IN EXERCISE AT THIS LEVEL?: 60 MIN

## 2023-12-19 ASSESSMENT — LIFESTYLE VARIABLES
AUDIT-C TOTAL SCORE: -1
SKIP TO QUESTIONS 9-10: 0

## 2023-12-19 ASSESSMENT — PATIENT HEALTH QUESTIONNAIRE - PHQ9
SUM OF ALL RESPONSES TO PHQ9 QUESTIONS 1 & 2: 0
2. FEELING DOWN, DEPRESSED OR HOPELESS: NOT AT ALL
1. LITTLE INTEREST OR PLEASURE IN DOING THINGS: NOT AT ALL

## 2023-12-19 ASSESSMENT — VISUAL ACUITY
OS_CC: 20/13
OD_CC: 20/15

## 2023-12-19 ASSESSMENT — ACTIVITIES OF DAILY LIVING (ADL): LACK_OF_TRANSPORTATION: NO

## 2023-12-19 ASSESSMENT — PAIN SCALES - GENERAL: PAINLEVEL: 3

## 2023-12-19 NOTE — PROGRESS NOTES
WELCOME TO MEDICARE PHYSICAL    HPI:    Brando Sexton is a 63 y.o. male who presents for a yearly  preventative physical.    Patient Care Team:  Avery Gordon MD as PCP - General (Family Medicine)     Total time spent: 854  1000 = 1 hour 6 minutes    Preventative time spent: 6 minutes problems #1    Office visit, acute/chronic care time spent: 1 hour problems 2 through 11    Below is a copy of your assessment and plan.  If you have questions or corrections please call Ruth, my nurse, at [phone] or you can send us nonemergent messages through [employer].    ASSESSMENT  AND PLAN:    Preventative diagnoses:  Preventative physical,   preventative care worksheet reviewed with my nurse and patient to assure accuracy; please see the checklist which will be attached to this chart note separately;   Vaccinations, see worksheet    Office visit, acute/chronic care diagnoses:  Rash, looks like folliculitis,   BEGIN doxycycline 100 mg p.o. twice daily x 7 days to see if we can knock it down.    If not let us know next week  Gout,   Control:   Refill febuxostat (Uloric) 80 mg daily  Rescue:   Refill colchicine 0.6 mg 1 to 2 tablets at onset of gout pain, then take 1 tablet every 3-4 hours until the pain resolves or GI side effects limit further doses; the next day begin twice daily dosing for a week;   when you get gout you need to  eliminate movement at the joint that is affected and you can do that with a stiff soled shoe or a postoperative shoe   Primary hypertension, controlled  refill lisinopril 10 mg daily  Atrial fibrillation, paroxysmal leaning towards permanent, regular rhythm today by palpation/auscultation  rate control:   Refill carvedilol 3.125 mg twice daily,   anticoagulation:   Refill warfarin,   Consult Coumadin clinic for warfarin dosing;  Staff from the clinic will give you a call to begin monitoring.  Obesity class II, with venous insufficiency and edema,   refill furosemide 20 mg daily   check  CMP  Echocardiogram ordered,  Cardiology staff will call you to schedule  Hyperglycemia,   check hemoglobin A1c with labs  Obstructive sleep apnea syndrome diagnosed 10 years ago with a nocturnal polysomnogram   currently uses Breathe Right strips and his ESS is only 4,   we could always take a look at this with a nocturnal oximeter but symptoms seem well-controlled at this time  BPH with 1 time nocturia,   PSA reassuring this year,   relatively asymptomatic  Sprain second digit left hand   if this takes more than 6 to 9 months to completely heal please let us know and we can consult Ortho for injection therapy  Bilateral knee pain,   Pain/physical medicine please contact Dr. Yates's office for referral to who physicians  can perform PRP if you are interested.        Diagnoses and all orders for this visit:    Need for vaccination    Gout of multiple sites, unspecified cause, unspecified chronicity  -     Ambulatory referral to Family Practice  -     febuxostat (ULORIC) 80 mg tablet; Take 1 tablet (80 mg total) by mouth daily    Medication management  -     Ambulatory referral to Family Practice    Longstanding persistent atrial fibrillation (CMS/HCC)    Essential hypertension  -     furosemide (LASIX) 20 mg tablet; Take 1 tablet (20 mg total) by mouth daily  -     lisinopriL (PRINIVIL,ZESTRIL) 10 mg tablet; Take 1 tablet (10 mg total) by mouth daily    Transient ischemic attack    Persistent atrial fibrillation (CMS/HCC)  -     carvediloL (COREG) 3.125 mg tablet; Take 1 tablet (3.125 mg total) by mouth 2 (two) times a day Unknown mg  -     warfarin (COUMADIN) 5 mg tablet; Alternates 2 mg with 4 mg every other day.  -     warfarin (COUMADIN) 1 mg tablet; Take 1 tablet (1 mg total) by mouth daily  -     Ambulatory referral to Anticoagulation Monitoring          ROXIE GUILLERMO MD  12/19/23    This document was dictated with a Dragon voice recognition device; sometimes the program transcribes words differently than  spoken.  Please notify me for error corrections.     Brando Sexton is a 63 y.o. male who presents for a yearly  preventative physical. Ruth, my nurse, compiled a 3 page comprehensive preventative checklist which she then reviewed with the patient and myself. I also reviewed the checklist with the patient for accuracy and completeness. That document will be attached to this chart note.       10-year ASCVD risk:  The ASCVD Risk score (Leland DK, et al., 2019) failed to calculate for the following reasons:    The patient has a prior MI or stroke diagnosis     Depression Screening:  PHQ 9: 0    Anxiety Screening:    GALEN-7: Over the last two weeks, how often have you been bothered by the following problems  Feeling Nervous, anxious or on edge: not at all  Not being able to stop or control worrying: not at all  Worrying too much about different things: not at all  Trouble Relaxing: not at all  Being so restless that it is hard to sit still: not at all  Becoming easily annoyed or irritable: not at all  Feeling afraid, as if something awful might happen: not at all  GALEN-7 Total Score: 0       Medicare Checklist:  Depression/mood:  1. Over the past two weeks, have you felt down, depressed or hopeless?  no   2. Over the past two weeks, have you felt little interest or pleasure in doing things?  no  Funtional ability/safety:  1. Was the patient's timed Up & Go test unsteady or longer than 30 seconds?   no  2. Do you need help with the phone, transportation, shopping, preparing meals housework, laundry, medications, or managing money?   no  3. Does your home have rugs in the hallway, lack grab bars in the bathroom, lack handrails on the stairs, or have poor lighting?   no  4. Have you noticed any hearing difficulties?   no  These questions have been asked and discussed with Brando Sexton.   Educational handouts were given and discussed with the patient if appropriate regarding the following topics:  healthy  living, healthy eating, diet and exercise, preventing falls at home, exercises to improve balance, flexibility, strength and staying power, prevention of chronic disease and the importance of tobacco free living and the cessation of use.   A written checklist of preventative services recommendations was distributed and discussed with patient.                               ____________________________________    HPI:    Brando Sexton is a 63 y.o. male who presents today as a new patient for his initial Medicare physical with multiple medical problems.    He has a rash over the lower back which is not painful mostly pruritic but it looks like a folliculitis and seems to come and go he tried Zyrtec which may be decrease the itching but has not eliminated the eruption.    He also has gout is on Uloric previously was also on colchicine every other day now just uses it for rescue but has not required rescue in some time.  Follow-up with Dr. Yates and rheumatology.    His blood pressure is controlled with lisinopril 10 mg a day and carvedilol 3.125 mg twice daily.  He has no personal history of heart attack or stroke there was a note of a TIA in his medical record but he denies that this is true.  I will remove it from the list.     He has a history of atrial fibrillation which sounds like it is a paroxysmal in nature on exam today he had a normal should say regular rhythm edinson auscultation and with palpation.  He is on Coumadin for anticoagulation and carvedilol for rate control.    He also has obesity class II and suspected venous insufficiency.  He is on furosemide 20 mg a day without potassium supplementation.  It is unknown if he has heart failure.    He also has a history of hyperglycemia his last A1c was nice and low he would like to recheck that this year.  No polyuria polydipsia or polyphagia.  Not on diabetic medications.    He also reports that he was diagnosed with obstructive sleep apnea  syndrome about 10 years ago by a nocturnal polysomnogram.  He did not end up initiating CPAP but uses Breathe Right strips and his wife tells him he no longer gasps or struggles to breathe at night.  His ESS today is 4 and is not interested in further workup.    He also has BPH with 1 time nocturia.  In fact he has nocturia maybe 2 or 3 times a week not even nightly.  His PSA was reassuring this year not interested in further treatment.  No dysuria or hematuria.    He fell on his left outstretched hand and has stiffness in the second digit of the left hand since.  This happened 2 to 3 months ago.  He had x-rays and there was no fracture found I suspect this will take another 6 months to completely heal the let us know if he would like a referral with ortho    He also describes bilateral knee pain and Dr. Yates is trying to get him into see Dr. Kraft for one of the pain physicians for PRP injections.        The following have been reviewed and updated as appropriate in this visit:        Allergies   Allergen Reactions    Allopurinol     Cephalexin      Current Outpatient Medications   Medication Sig Dispense Refill    ascorbic acid (VITAMIN C ORAL) Take by mouth      febuxostat (ULORIC) 80 mg tablet Take 1 tablet (80 mg total) by mouth daily 90 tablet 3    carvediloL (COREG) 3.125 mg tablet Take 1 tablet (3.125 mg total) by mouth 2 (two) times a day Unknown mg 180 tablet 3    furosemide (LASIX) 20 mg tablet Take 1 tablet (20 mg total) by mouth daily 90 tablet 3    lisinopriL (PRINIVIL,ZESTRIL) 10 mg tablet Take 1 tablet (10 mg total) by mouth daily 90 tablet 3    warfarin (COUMADIN) 4 mg tablet Alternate 2mg with 4mg tablet every other day 45 tablet 3    warfarin (COUMADIN) 2 mg tablet Alternate 2mg with 4mg tablet every other day 45 tablet 3     No current facility-administered medications for this visit.     Past Medical History:   Diagnosis Date    Atrial fibrillation (CMS/HCC)     COVID-19 01/14/2022    Gout   "   Headache 10/17/2023    Injury of knee 10/17/2023    Pain in joint, shoulder region 01/25/2012    Note: Unchanged    Psoriasis     Sprain of rotator cuff capsule 07/12/2011    Note: Unchanged     Past Surgical History:   Procedure Laterality Date    CARDIOVERSION  03/2010    COLONOSCOPY  01/01/1995    ORTHOPEDIC SURGERY Bilateral     Knee Scope    OTHER SURGICAL HISTORY Right     Orthopedic:Rotator cuff x2 Right shoulder     Family History   Problem Relation Age of Onset    Heart disease Mother     Colon cancer Mother     Heart disease Father         MI at age 87    Other Father         Crohns    Psoriasis Father     Asthma Father     Diabetes type II Father's Sister     Heart disease Father's Brother      Social History     Socioeconomic History    Marital status:    Tobacco Use    Smoking status: Former    Smokeless tobacco: Former   Substance and Sexual Activity    Alcohol use: No     Social Determinants of Health     Tobacco Use: Medium Risk (12/19/2023)    Patient History     Smoking Tobacco Use: Former     Smokeless Tobacco Use: Former       Review of Systems    Physical Exam:  /82   Pulse 85   Temp 36.6 °C (97.9 °F)   Resp 22   Ht 1.943 m (6' 4.5\")   Wt (!) 145.6 kg (321 lb)   SpO2 92%   BMI 38.56 kg/m²   Physical Exam      ROXIE GUILLERMO MD  12/19/23    This document was dictated with a Dragon voice recognition device; sometimes the program transcribes words differently than spoken.  Please notify me for error corrections.     "

## 2023-12-19 NOTE — LETTER
12/19/23      [facility]  [address] [place] [id number] [phone]  Dept: [phone]      Dear Jc,     Below is a copy of your assessment and plan.  If you have questions or corrections please call Ruth, my nurse, at [phone] or you can send us nonemergent messages through [employer].    ASSESSMENT  AND PLAN:    Preventative diagnoses:  Preventative physical,   preventative care worksheet reviewed with my nurse and patient to assure accuracy; please see the checklist which will be attached to this chart note separately;   Vaccinations, see worksheet    Office visit, acute/chronic care diagnoses:  Rash, looks like folliculitis,   BEGIN doxycycline 100 mg p.o. twice daily x 7 days to see if we can knock it down.    If not let us know next week  Gout,   Control:   Refill febuxostat (Uloric) 80 mg daily  Rescue:   Refill colchicine 0.6 mg 1 to 2 tablets at onset of gout pain, then take 1 tablet every 3-4 hours until the pain resolves or GI side effects limit further doses; the next day begin twice daily dosing for a week;   when you get gout you need to  eliminate movement at the joint that is affected and you can do that with a stiff soled shoe or a postoperative shoe   Primary hypertension, controlled  refill lisinopril 10 mg daily  Atrial fibrillation, paroxysmal leaning towards permanent, regular rhythm today by palpation/auscultation  rate control:   Refill carvedilol 3.125 mg twice daily,   anticoagulation:   Refill warfarin,   Consult Coumadin clinic for warfarin dosing;  Staff from the clinic will give you a call to begin monitoring.  Obesity class II, with venous insufficiency and edema,   refill furosemide 20 mg daily   check CMP  Echocardiogram ordered,  Cardiology staff will call you to schedule  Hyperglycemia,   check hemoglobin A1c with labs  Obstructive sleep apnea syndrome diagnosed 10 years ago with a nocturnal polysomnogram   currently uses Breathe Right strips  and his ESS is only 4,   we could always take a look at this with a nocturnal oximeter but symptoms seem well-controlled at this time  BPH with 1 time nocturia,   PSA reassuring this year,   relatively asymptomatic  Sprain second digit left hand   if this takes more than 6 to 9 months to completely heal please let us know and we can consult Ortho for injection therapy  Bilateral knee pain,   Pain/physical medicine please contact Dr. Yates's office for referral to who physicians  can perform PRP if you are interested.        Diagnoses and all orders for this visit:    Need for vaccination    Gout of multiple sites, unspecified cause, unspecified chronicity  -     Ambulatory referral to Family Practice  -     febuxostat (ULORIC) 80 mg tablet; Take 1 tablet (80 mg total) by mouth daily    Medication management  -     Ambulatory referral to Family Practice    Longstanding persistent atrial fibrillation (CMS/HCC)    Essential hypertension  -     furosemide (LASIX) 20 mg tablet; Take 1 tablet (20 mg total) by mouth daily  -     lisinopriL (PRINIVIL,ZESTRIL) 10 mg tablet; Take 1 tablet (10 mg total) by mouth daily    Transient ischemic attack    Persistent atrial fibrillation (CMS/HCC)  -     carvediloL (COREG) 3.125 mg tablet; Take 1 tablet (3.125 mg total) by mouth 2 (two) times a day Unknown mg  -     warfarin (COUMADIN) 5 mg tablet; Alternates 2 mg with 4 mg every other day.  -     warfarin (COUMADIN) 1 mg tablet; Take 1 tablet (1 mg total) by mouth daily  -     Ambulatory referral to Anticoagulation Monitoring          ROXIE GUILLERMO MD  12/19/23    This document was dictated with a Dragon voice recognition device; sometimes the program transcribes words differently than spoken.  Please notify me for error corrections.

## 2023-12-19 NOTE — PROGRESS NOTES
Pharmacy team was consulted by Dr. Gordon today to manage warfarin for this patient. I spoke with Brando over the phone. Brando is on warfarin for history of atrial fibrillation with INR goal of 2-3. Brando's warfarin was previously managed by [facility]. Last INR was 1.8 on 11/26/23. I notified [facility] by phone that we will manage moving forward.     Current home dose is warfarin 2 mg on Sun/Tues/Thurs and 4 mg all other days of the week. He has 4 mg tablets.     Brando scheduled an appointment to see us at  anticoagulation clinic tomorrow, 12/20 @ 3679.    Thank you,   Yair Mcdaniels, PharmD  [phone]

## 2023-12-19 NOTE — PATIENT INSTRUCTIONS
"Below is a copy of your assessment and plan.  If you have questions or corrections please call Ruth, my nurse, at [phone] or you can send us nonemergent messages through [employer].    ASSESSMENT  AND PLAN:    Preventative diagnoses:  Preventative physical,   preventative care worksheet reviewed with my nurse and patient to assure accuracy; please see the checklist which will be attached to this chart note separately;   Vaccinations, see worksheet    Office visit, acute/chronic care diagnoses:  Gout,   Control: Refill febuxostat (Uloric) 80 mg daily  rescue: Begin colchicine 0.6 mg 1 to 2 tablets at onset of gout pain, then take 1 tablet every 3-4 hours until the pain resolves or GI side effects limit further doses; the next day begin twice daily dosing for a week; when you get gout you need to  eliminate movement at the joint that is affected and you can do that with a stiff soled shoe or a postoperative shoe that we can give you here.  Mild intermittent asthma,   rescue: Refill albuterol 2 puffs every 4-6 hours as needed for shortness of breath; if you find you are using albuterol more than 2 times a week we will need to begin a \"controller\" medication  Primary hypertension,   refill lisinopril 10 mg daily  Atrial fibrillation,   rate control: Refill carvedilol twice daily,   anticoagulation: Refill warfarin, continue dosing through the Coumadin clinic  Obesity class II, with venous insufficiency and edema,   refill furosemide 20 mg daily    "

## 2023-12-20 ENCOUNTER — ANTICOAGULATION VISIT (OUTPATIENT)
Dept: ANTICOAGULATION | Facility: CLINIC | Age: 63
End: 2023-12-20
Payer: MEDICARE

## 2023-12-20 ENCOUNTER — LAB (OUTPATIENT)
Dept: LAB | Facility: CLINIC | Age: 63
End: 2023-12-20
Payer: MEDICARE

## 2023-12-20 VITALS
SYSTOLIC BLOOD PRESSURE: 138 MMHG | WEIGHT: 315 LBS | BODY MASS INDEX: 37.19 KG/M2 | HEIGHT: 77 IN | DIASTOLIC BLOOD PRESSURE: 82 MMHG

## 2023-12-20 DIAGNOSIS — I48.19 PERSISTENT ATRIAL FIBRILLATION (CMS/HCC): Primary | Chronic | ICD-10-CM

## 2023-12-20 DIAGNOSIS — Z11.59 NEED FOR HEPATITIS C SCREENING TEST: ICD-10-CM

## 2023-12-20 DIAGNOSIS — R73.9 HYPERGLYCEMIA: ICD-10-CM

## 2023-12-20 DIAGNOSIS — Z13.220 SCREENING FOR LIPID DISORDERS: ICD-10-CM

## 2023-12-20 DIAGNOSIS — I48.91 ATRIAL FIBRILLATION, UNSPECIFIED TYPE (CMS/HCC): ICD-10-CM

## 2023-12-20 DIAGNOSIS — I10 ESSENTIAL HYPERTENSION: ICD-10-CM

## 2023-12-20 LAB
ALBUMIN SERPL-MCNC: 4.1 G/DL (ref 3.5–5.3)
ALP SERPL-CCNC: 84 U/L (ref 45–115)
ALT SERPL-CCNC: 15 U/L (ref 7–52)
ANION GAP SERPL CALC-SCNC: 9 MMOL/L (ref 3–11)
AST SERPL-CCNC: 21 U/L
BILIRUB SERPL-MCNC: 1.09 MG/DL (ref 0.2–1.4)
BUN SERPL-MCNC: 26 MG/DL (ref 7–25)
CALCIUM ALBUM COR SERPL-MCNC: 9 MG/DL (ref 8.6–10.3)
CALCIUM SERPL-MCNC: 9.1 MG/DL (ref 8.6–10.3)
CHLORIDE SERPL-SCNC: 106 MMOL/L (ref 98–107)
CHOLEST SERPL-MCNC: 202 MG/DL (ref 0–199)
CO2 SERPL-SCNC: 25 MMOL/L (ref 21–32)
CREAT SERPL-MCNC: 1.23 MG/DL (ref 0.7–1.3)
CREAT UR-MCNC: 90.5 MG/DL
EGFRCR SERPLBLD CKD-EPI 2021: 66 ML/MIN/1.73M*2
EST. AVERAGE GLUCOSE BLD GHB EST-MCNC: 119.8 MG/DL
FASTING STATUS PATIENT QL REPORTED: YES
GLUCOSE SERPL-MCNC: 103 MG/DL (ref 70–105)
HBA1C MFR BLD: 5.8 % (ref 4–6)
HCV AB SER QL: NORMAL
HDLC SERPL-MCNC: 50 MG/DL
INR (AMB): 2.3 (ref 2–3)
LDLC SERPL CALC-MCNC: 131 MG/DL (ref 20–99)
MICROALBUMIN UR-MCNC: <7 MG/L (ref 0–30)
POTASSIUM SERPL-SCNC: 4.5 MMOL/L (ref 3.5–5.1)
PROT SERPL-MCNC: 6.5 G/DL (ref 6–8.3)
SODIUM SERPL-SCNC: 140 MMOL/L (ref 135–145)
TRIGL SERPL-MCNC: 103 MG/DL

## 2023-12-20 PROCEDURE — 80053 COMPREHEN METABOLIC PANEL: CPT

## 2023-12-20 PROCEDURE — 86803 HEPATITIS C AB TEST: CPT

## 2023-12-20 PROCEDURE — 85610 PROTHROMBIN TIME: CPT | Mod: QW

## 2023-12-20 PROCEDURE — 36415 COLL VENOUS BLD VENIPUNCTURE: CPT

## 2023-12-20 PROCEDURE — 80061 LIPID PANEL: CPT

## 2023-12-20 PROCEDURE — 83036 HEMOGLOBIN GLYCOSYLATED A1C: CPT

## 2023-12-20 PROCEDURE — G0463 HOSPITAL OUTPT CLINIC VISIT: HCPCS

## 2023-12-20 PROCEDURE — 82043 UR ALBUMIN QUANTITATIVE: CPT

## 2023-12-20 NOTE — PROGRESS NOTES
"Subjective   Brando Sexton presents to the anticoagulation clinic for monitoring of his long term warfarin therapy.       Today Brando reports the following:   Bleeding signs/symptoms: No   Major bleeding event: No  Thrombosis signs/symptoms: No  Thromboembolic event: No  Missed doses: No  Medication changes: No  Dietary changes: No  Bacterial/viral infection: no  Other concerns: No    Current Outpatient Medications   Medication Sig Dispense Refill    febuxostat (ULORIC) 80 mg tablet Take 1 tablet (80 mg total) by mouth daily 90 tablet 3    carvediloL (COREG) 3.125 mg tablet Take 1 tablet (3.125 mg total) by mouth 2 (two) times a day Unknown mg 180 tablet 3    furosemide (LASIX) 20 mg tablet Take 1 tablet (20 mg total) by mouth daily 90 tablet 3    lisinopriL (PRINIVIL,ZESTRIL) 10 mg tablet Take 1 tablet (10 mg total) by mouth daily 90 tablet 3    warfarin (COUMADIN) 4 mg tablet Alternate 2mg with 4mg tablet every other day 45 tablet 3    warfarin (COUMADIN) 2 mg tablet Alternate 2mg with 4mg tablet every other day 45 tablet 3    doxycycline (MONODOX) 100 mg capsule Take 1 capsule (100 mg total) by mouth 2 (two) times a day for 7 days 14 capsule 0    ascorbic acid (VITAMIN C ORAL) Take by mouth       No current facility-administered medications for this visit.         Objective     /82 (BP Location: Left arm, Patient Position: Sitting, Cuff Size: Regular Adult)   Ht 76.5\" (194.3 cm)   Wt (!) 145.6 kg (321 lb)   BMI 38.56 kg/m²     Assessment/Plan      Anticoagulation Summary  As of 12/20/2023      INR goal:  2.0-3.0   INR used for dosing:     Full warfarin instructions:  2 mg every Sun, Tue, Thu; 4 mg all other days   Next INR check:      Indications    Persistent atrial fibrillation (CMS/HCC) [I48.19]                 Anticoagulation Care Providers       Provider Role Specialty Phone number    Avery Gordon MD Referring Family Medicine [phone]            Anticoagulation therapy status: INR is " stable, no dose adjustment required.  Patient verbalized understanding regarding dose, monitoring and INR.      Please reference Anticoagulation episode for additional documentation.

## 2023-12-27 ENCOUNTER — ANTICOAGULATION VISIT (OUTPATIENT)
Dept: PHARMACY | Facility: HOSPITAL | Age: 63
End: 2023-12-27
Payer: MEDICARE

## 2023-12-27 ENCOUNTER — LAB (OUTPATIENT)
Dept: LAB | Facility: CLINIC | Age: 63
End: 2023-12-27
Payer: MEDICARE

## 2023-12-27 DIAGNOSIS — I48.19 PERSISTENT ATRIAL FIBRILLATION (CMS/HCC): Primary | Chronic | ICD-10-CM

## 2023-12-27 DIAGNOSIS — R73.9 HYPERGLYCEMIA: ICD-10-CM

## 2023-12-27 DIAGNOSIS — I48.19 PERSISTENT ATRIAL FIBRILLATION (CMS/HCC): Chronic | ICD-10-CM

## 2023-12-27 LAB
CREAT UR-MCNC: 36 MG/DL
EST. AVERAGE GLUCOSE BLD GHB EST-MCNC: 116.9 MG/DL
HBA1C MFR BLD: 5.7 % (ref 4–6)
INR BLD: 1.8
MICROALBUMIN UR-MCNC: <7 MG/L (ref 0–30)
PROTHROMBIN TIME: 20.5 SECONDS (ref 9.4–12.5)

## 2023-12-27 PROCEDURE — 36415 COLL VENOUS BLD VENIPUNCTURE: CPT

## 2023-12-27 PROCEDURE — 82570 ASSAY OF URINE CREATININE: CPT

## 2023-12-27 PROCEDURE — 83036 HEMOGLOBIN GLYCOSYLATED A1C: CPT

## 2023-12-27 PROCEDURE — 85610 PROTHROMBIN TIME: CPT

## 2023-12-27 NOTE — PROGRESS NOTES
Subjective   Brando Sexton is contacted by the anticoagulation clinic for monitoring of his long term warfarin therapy. Jc confirmed warfarin 2 mg on TuThSun and 4 mg all other days of the week. He has 4 mg tablets.       Today Brando reports the following:   Bleeding signs/symptoms: No   Major bleeding event: No  Thrombosis signs/symptoms: No  Thromboembolic event: No  Missed doses: No  Medication changes: No  Will completed doxycycline today.  Dietary changes: Yes  Ate brussel Tourats over Arias.  Bacterial/viral infection: no  Other concerns: No    Current Outpatient Medications   Medication Sig Dispense Refill    febuxostat (ULORIC) 80 mg tablet Take 1 tablet (80 mg total) by mouth daily 90 tablet 3    carvediloL (COREG) 3.125 mg tablet Take 1 tablet (3.125 mg total) by mouth 2 (two) times a day Unknown mg 180 tablet 3    furosemide (LASIX) 20 mg tablet Take 1 tablet (20 mg total) by mouth daily 90 tablet 3    lisinopriL (PRINIVIL,ZESTRIL) 10 mg tablet Take 1 tablet (10 mg total) by mouth daily 90 tablet 3    warfarin (COUMADIN) 4 mg tablet Alternate 2mg with 4mg tablet every other day 45 tablet 3    warfarin (COUMADIN) 2 mg tablet Alternate 2mg with 4mg tablet every other day 45 tablet 3    ascorbic acid (VITAMIN C ORAL) Take by mouth       No current facility-administered medications for this visit.         Objective     There were no vitals taken for this visit.    Assessment/Plan      Anticoagulation Summary  As of 2023      INR goal:  2.0-3.0   INR used for dosin.8 (2023)   Full warfarin instructions:  2 mg every Sun, Tue, Thu; 4 mg all other days   No change documented:  Yair Mcdaniels, PharmD   Next INR check:  1/10/2024   Priority:  High    Indications    Persistent atrial fibrillation (CMS/HCC) [I48.19]                 Anticoagulation Care Providers       Provider Role Specialty Phone number    Avery Gordon MD Referring Family Medicine [phone]             Anticoagulation therapy status: INR is stable, no dose adjustment required. Continue warfarin 2 mg Tues/Thurs/Sun and 4 mg all other days of the week. Recheck INR in 2 weeks - if low at that time will increase the dosing.  Patient verbalized understanding regarding dose, monitoring and INR.      Please reference Anticoagulation episode for additional documentation.

## 2023-12-28 NOTE — RESULT ENCOUNTER NOTE
Will you check with Brando and see if he has had a history of heart attack or stroke.  If he has had either he should be on a statin and we should shoot for an LDL of 70 or lower.  Please begin rosuvastatin 10 mg daily and we can recheck a lipid profile next year

## 2024-01-10 ENCOUNTER — ANTICOAGULATION VISIT (OUTPATIENT)
Dept: ANTICOAGULATION | Facility: CLINIC | Age: 64
End: 2024-01-10
Payer: MEDICARE

## 2024-01-10 VITALS — SYSTOLIC BLOOD PRESSURE: 128 MMHG | DIASTOLIC BLOOD PRESSURE: 67 MMHG | WEIGHT: 315 LBS | BODY MASS INDEX: 38.32 KG/M2

## 2024-01-10 DIAGNOSIS — I48.19 PERSISTENT ATRIAL FIBRILLATION (CMS/HCC): Primary | Chronic | ICD-10-CM

## 2024-01-10 LAB — INR (AMB): 2.3 (ref 2–3)

## 2024-01-10 PROCEDURE — 85610 PROTHROMBIN TIME: CPT | Mod: QW

## 2024-01-10 PROCEDURE — G0463 HOSPITAL OUTPT CLINIC VISIT: HCPCS

## 2024-01-10 ASSESSMENT — PAIN SCALES - GENERAL: PAINLEVEL: 0-NO PAIN

## 2024-01-10 NOTE — PROGRESS NOTES
Subjective   Brando Sexton presents to the anticoagulation clinic for monitoring of his long term warfarin therapy.       Today Brando reports the following:   Bleeding signs/symptoms: No   Major bleeding event: No  Thrombosis signs/symptoms: No  Thromboembolic event: No  Missed doses: No  Medication changes: No  Dietary changes: No  Bacterial/viral infection: no  Other concerns: No    Current Outpatient Medications   Medication Sig Dispense Refill    febuxostat (ULORIC) 80 mg tablet Take 1 tablet (80 mg total) by mouth daily 90 tablet 3    carvediloL (COREG) 3.125 mg tablet Take 1 tablet (3.125 mg total) by mouth 2 (two) times a day Unknown mg 180 tablet 3    furosemide (LASIX) 20 mg tablet Take 1 tablet (20 mg total) by mouth daily 90 tablet 3    lisinopriL (PRINIVIL,ZESTRIL) 10 mg tablet Take 1 tablet (10 mg total) by mouth daily 90 tablet 3    warfarin (COUMADIN) 4 mg tablet Alternate 2mg with 4mg tablet every other day 45 tablet 3    warfarin (COUMADIN) 2 mg tablet Alternate 2mg with 4mg tablet every other day 45 tablet 3    ascorbic acid (VITAMIN C ORAL) Take by mouth       No current facility-administered medications for this visit.         Objective     /67 (BP Location: Left arm, Patient Position: Sitting, Cuff Size: Regular Adult)   Wt (!) 144.7 kg (319 lb)   BMI 38.32 kg/m²     Assessment/Plan      Anticoagulation Summary  As of 1/10/2024      INR goal:  2.0-3.0   INR used for dosin.3 (1/10/2024)   Full warfarin instructions:  2 mg every Sun, Tue, Thu; 4 mg all other days   No change documented:  Fredi Calles, PharmD   Next INR check:  2024   Priority:  High    Indications    Persistent atrial fibrillation (CMS/HCC) [I48.19]                 Anticoagulation Care Providers       Provider Role Specialty Phone number    Avery Gordon MD Referring Family Medicine [phone]            Anticoagulation therapy status: INR is stable, no dose adjustment required.  Patient verbalized  understanding regarding dose, monitoring and INR.    Additional education : Follow up in 4 weeks      Please reference Anticoagulation episode for additional documentation.

## 2024-02-02 ENCOUNTER — OFFICE VISIT (OUTPATIENT)
Dept: DERMATOLOGY | Facility: CLINIC | Age: 64
End: 2024-02-02
Payer: MEDICARE

## 2024-02-02 DIAGNOSIS — L21.9 SEBORRHEIC DERMATITIS: ICD-10-CM

## 2024-02-02 DIAGNOSIS — L20.9 ATOPIC DERMATITIS, UNSPECIFIED TYPE: Primary | ICD-10-CM

## 2024-02-02 PROCEDURE — 99203 OFFICE O/P NEW LOW 30 MIN: CPT | Performed by: STUDENT IN AN ORGANIZED HEALTH CARE EDUCATION/TRAINING PROGRAM

## 2024-02-02 PROCEDURE — G0463 HOSPITAL OUTPT CLINIC VISIT: HCPCS | Mod: PO | Performed by: STUDENT IN AN ORGANIZED HEALTH CARE EDUCATION/TRAINING PROGRAM

## 2024-02-02 RX ORDER — TRIAMCINOLONE ACETONIDE 1 MG/G
OINTMENT TOPICAL
Qty: 454 G | Refills: 3 | Status: SHIPPED | OUTPATIENT
Start: 2024-02-02

## 2024-02-02 RX ORDER — KETOCONAZOLE 20 MG/ML
SHAMPOO, SUSPENSION TOPICAL
Qty: 120 ML | Refills: 11 | Status: SHIPPED | OUTPATIENT
Start: 2024-02-02 | End: 2024-02-05 | Stop reason: SDUPTHER

## 2024-02-02 RX ORDER — TRIAMCINOLONE ACETONIDE 1 MG/G
CREAM TOPICAL
Qty: 454 G | Refills: 3 | Status: SHIPPED | OUTPATIENT
Start: 2024-02-02

## 2024-02-02 NOTE — PATIENT INSTRUCTIONS
Recommendations for Dry Skin             Bathing   Use lukewarm water, not too hot or too cold   Once daily or every other day bath/shower   Never scrub with a washcloth, sponge, loofah or brush   Use pH balanced “syndet” cleansers that do NOT make soaps and suds   Bar soaps: unscented Dove, Cetaphil bar, Vanicream bar   Liquid soaps: Cetaphil Gentle Cleanser, Eucerin Advanced Cleanser   No bubble-baths   Limit bathing/showering time to 10-15 minutes   Pat the skin dry, do not rub   Directly after patting dry, apply medication and/or moisturizer   Robathol bath oil or Aveeno oatmeal bath packets are great additions to soften bathwater as well as coat the skin to lock in moisture. Caution: they can make the tub slippery.    Moisturizers   Ointments/oils (when dry skin is the worst): Aquaphor, Vaseline, coconut oil, Cerave ointment, Vanicream ointment   Creams (for typical days): Eucerin, Aveeno, Cerave, Cetaphil, Vanicream   Lotions (for very mild days): Eucerin, Aveeno, Cerave, Cetaphil   Apply all over, to entire body, ideally twice a day (at least once a day after showers)    Prescription Creams or Ointments   Should only be applied to areas of the rash unless otherwise directed by Dermatologist   Apply medication first, then apply moisturizer     Other   Do not use perfumes, sprays, powders or colognes.   Use unscented “free & clear” laundry products.   Avoid rough, tight clothing. Wool, scratchy or new clothes can all be very irritating. Cotton or silk are usually best.   Wash all new clothing before wearing.   If the skin is very dry, a humidifier at home can help. Remember to clean it frequently to avoid the spread of mold.

## 2024-02-02 NOTE — PROGRESS NOTES
Subjective   HPI  Brando Sexton is a 64 y.o. male without personal history of skin cancer, presenting with a chief complaint of a rash of lower back, as referred by Dr. Gordon. Patient requests a above the waist skin exam. Patient reports this rash has been present for several months and has been on his lower back, bilateral axilla, and occasionally on his arms. Per referral note, Dr. Gordon initially suspected this rash to be folliculitis, but no improvement was noted after a 7-day course of doxycycline. Patient states that sometimes, if he scratches his rash in a very specific location on his back, he feels a tingle that goes down the back of his leg and into the sole of his foot. He is unsure if these are related.     Reports no family history of skin cancer.; reports history of blistering sunburns; denies history of tanning bed use.    Otherwise denies any lesions with associated rapid growth, pigment change, or spontaneous bleeding. Denies any other concerns or acute changes to baseline health.    Review of Systems  A 12 point review of systems was performed and negative aside from discussed in HPI.    Objective   Physical Examination  Vital Signs There were no vitals taken for this visit.    General: Awake, alert and oriented x 3; no apparent distress  Skin: A localized examination of the trunk and extremities was performed and notable for  .  Scattered greasy scaled patches and thin plaques overlying background poorly defined erythema over the face, scalp and chest., Diffuse xerosis and dry scaling over trunk and extremities., and Scattered poorly defined eczematous, dry scaled thin plaques with secondary excoriations,   over the bilateral flanks.    Assessment and Plan  1. Atopic dermatitis, unspecified type  Chronic, persistent, worsening and not well controlled.  -Education provided regarding diagnosis, prognosis, and recommended treatment plan including risks and benefits of recommended  prescription therapy options and/or possible procedural treatments and alternatives to therapy.  -Patient acknowledged understanding and amenable to plan as outlined below.  -Risks, benefits and appropriate use of prescribed medications reviewed with patient who acknowledged understanding and amenable to plan.  -Advised to begin lukewarm showers, no more than 15 minutes, with pH balanced cleanser or moisturizing soaps such as bar white Dove soap. Apply moisturizing cream after patting dry. Recommend Free & Clear products.  -Recommend OTC emollient therapy containing active ingredients such as 1% pramoxine, 1% dimethicone/colloidal oatmeal, 10% urea and/or 12% lactic acid.  Chronic, persistent, worsening and not well controlled.  -Education provided regarding diagnosis, prognosis, and recommended treatment plan including risks and benefits of recommended prescription therapy options and/or possible procedural treatments and alternatives to therapy.  -Patient acknowledged understanding and amenable to plan as outlined below.  -Risks, benefits and appropriate use of prescribed medications reviewed with patient who acknowledged understanding and amenable to plan.  - Ambulatory referral to Dermatology  - triamcinolone (KENALOG) 0.1 % cream; Apply thin layer over affected areas twice daily for two weeks then twice weekly for maintenance. OK to increase use again for flares. Avoid face, armpits, and groin.  Dispense: 454 g; Refill: 3  - triamcinolone (KENALOG) 0.1 % ointment; Apply thin layer over affected areas twice daily for two weeks then twice weekly for maintenance. OK to increase use again for flares. Avoid face, armpits and groin.  Dispense: 454 g; Refill: 3    2. Seborrheic dermatitis  Chronic, persistent, worsening and not well controlled.  Of note patient is currently using prescription hydrocortisone ointment to the affected areas, as patient slowly weaning off topical cortisone.  Every other day for the next 2  weeks every 2 days for the following 2 weeks and once weekly for 2 weeks as tolerated.  Suspect medicated shampoos will help with clearance, otherwise advised patient to use cortisone ointment sparingly as needed for flares for no more than 5 to 7 days at a time.  -Education provided regarding diagnosis, prognosis, and recommended treatment plan including risks and benefits of recommended prescription therapy options and/or possible procedural treatments and alternatives to therapy.  -Patient acknowledged understanding and amenable to plan as outlined below.  -Risks, benefits and appropriate use of prescribed medications reviewed with patient who acknowledged understanding and amenable to plan.  - ketoconazole (NIZORAL) 2 % shampoo; Shampoo scalp, nose, beard, and chest twice weekly. Let lather sit five minutes before rinsing. Alternate with an OTC dandruff shampoo.  Dispense: 120 mL; Refill: 11      Procedure(s)                  Return in about 3 months (around 5/2/2024).  Follow-up as directed above or as needed for any new or changing lesions or skin changes of concern.    Chiara Esteves MD

## 2024-02-05 ENCOUNTER — TELEPHONE (OUTPATIENT)
Dept: DERMATOLOGY | Facility: CLINIC | Age: 64
End: 2024-02-05
Payer: MEDICARE

## 2024-02-05 DIAGNOSIS — L21.9 SEBORRHEIC DERMATITIS: ICD-10-CM

## 2024-02-05 RX ORDER — KETOCONAZOLE 20 MG/ML
SHAMPOO, SUSPENSION TOPICAL
Qty: 120 ML | Refills: 11 | Status: SHIPPED | OUTPATIENT
Start: 2024-02-05 | End: 2024-02-23

## 2024-02-05 NOTE — TELEPHONE ENCOUNTER
Patient reports [facility] is out of stock of the Nizoral 2% and he picked up the OTC 1% as a replacement. Advised this is okay to use but the 2% prescription is preferred. Will reach out to [facility] to see if they have this in stock and transfer the prescription over to them. Patient also reports he is unable to apply anything to his back. Recommended back applicators that he can get from the pharmacy for both his soaps and his steroid cream / ointment. Understanding verbalized and he will call back with any issues.   No

## 2024-02-08 ENCOUNTER — TELEPHONE (OUTPATIENT)
Dept: DERMATOLOGY | Facility: CLINIC | Age: 64
End: 2024-02-08
Payer: MEDICARE

## 2024-02-09 ENCOUNTER — ANTICOAGULATION VISIT (OUTPATIENT)
Dept: PHARMACY | Facility: HOSPITAL | Age: 64
End: 2024-02-09
Payer: MEDICARE

## 2024-02-09 ENCOUNTER — LAB (OUTPATIENT)
Dept: LAB | Facility: CLINIC | Age: 64
End: 2024-02-09
Payer: MEDICARE

## 2024-02-09 DIAGNOSIS — I48.19 PERSISTENT ATRIAL FIBRILLATION (CMS/HCC): Primary | Chronic | ICD-10-CM

## 2024-02-09 DIAGNOSIS — I48.19 PERSISTENT ATRIAL FIBRILLATION (CMS/HCC): Chronic | ICD-10-CM

## 2024-02-09 LAB
INR BLD: 2.1
PROTHROMBIN TIME: 23.2 SECONDS (ref 9.4–12.5)

## 2024-02-09 PROCEDURE — 36415 COLL VENOUS BLD VENIPUNCTURE: CPT

## 2024-02-09 PROCEDURE — 85610 PROTHROMBIN TIME: CPT

## 2024-02-09 NOTE — PROGRESS NOTES
Subjective   Brando Sexton is contacted by the anticoagulation clinic for monitoring of his long term warfarin therapy.     INR: 2.1  Continue current Warfarin dosing  Follow-up INR: 1 month    Current Outpatient Medications   Medication Sig Dispense Refill    ketoconazole (NIZORAL) 2 % shampoo Shampoo scalp, nose, beard, and chest twice weekly. Let lather sit five minutes before rinsing. Alternate with an OTC dandruff shampoo. 120 mL 11    triamcinolone (KENALOG) 0.1 % cream Apply thin layer over affected areas twice daily for two weeks then twice weekly for maintenance. OK to increase use again for flares. Avoid face, armpits, and groin. 454 g 3    triamcinolone (KENALOG) 0.1 % ointment Apply thin layer over affected areas twice daily for two weeks then twice weekly for maintenance. OK to increase use again for flares. Avoid face, armpits and groin. 454 g 3    febuxostat (ULORIC) 80 mg tablet Take 1 tablet (80 mg total) by mouth daily 90 tablet 3    carvediloL (COREG) 3.125 mg tablet Take 1 tablet (3.125 mg total) by mouth 2 (two) times a day Unknown mg 180 tablet 3    furosemide (LASIX) 20 mg tablet Take 1 tablet (20 mg total) by mouth daily 90 tablet 3    lisinopriL (PRINIVIL,ZESTRIL) 10 mg tablet Take 1 tablet (10 mg total) by mouth daily 90 tablet 3    warfarin (COUMADIN) 4 mg tablet Alternate 2mg with 4mg tablet every other day 45 tablet 3    warfarin (COUMADIN) 2 mg tablet Alternate 2mg with 4mg tablet every other day 45 tablet 3    ascorbic acid (VITAMIN C ORAL) Take by mouth       No current facility-administered medications for this visit.         Objective     There were no vitals taken for this visit.    Assessment/Plan      Anticoagulation Summary  As of 2024      INR goal:  2.0-3.0   INR used for dosin.1 (2024)   Full warfarin instructions:  2 mg every Sun, e, Thu; 4 mg all other days   No change documented:  Benoit Martinez PharmD   Next INR check:  3/8/2024   Priority:   Maintenance    Indications    Persistent atrial fibrillation (CMS/[facility]) [I48.19]                 Anticoagulation Care Providers       Provider Role Specialty Phone number    Avery Gordon MD Referring Family Medicine [phone]            Anticoagulation therapy status: INR is stable, no dose adjustment required.  Patient verbalized understanding regarding dose, monitoring and INR.      Please reference Anticoagulation episode for additional documentation.

## 2024-02-13 ENCOUNTER — TELEPHONE (OUTPATIENT)
Dept: DERMATOLOGY | Facility: CLINIC | Age: 64
End: 2024-02-13
Payer: MEDICARE

## 2024-02-13 NOTE — TELEPHONE ENCOUNTER
Patient called in to clarify the instructions on the steroid creams and ointments and wanted to make sure he is only using the topical steroid twice daily whether it be the cream or the ointment. I verbalized that is correct and we typically let patient's know the cream is better in the morning while the ointment is better at night as it is more of a greasy feel than the cream. Patient verbalized understanding and thanked me for clarifying.

## 2024-02-21 ENCOUNTER — ANCILLARY PROCEDURE (OUTPATIENT)
Dept: CARDIOLOGY | Facility: CLINIC | Age: 64
End: 2024-02-21
Payer: MEDICARE

## 2024-02-21 DIAGNOSIS — I10 ESSENTIAL HYPERTENSION: ICD-10-CM

## 2024-02-21 LAB
ASCENDING AORTA: 4.12 CM
AV LVOT PEAK GRADIENT: 1.69 MMHG
AV MEAN GRADIENT: 3.86 MMHG
AV PEAK GRADIENT: 7.18 MMHG
DOP CALC AO PEAK VEL: 1.34 M/S
DOP CALC AO VTI: 30.7 CM
DOP CALC LVOT AREA: 5.15 CM2
DOP CALC LVOT DIAMETER: 2.56 CM
DOP CALC LVOT PEAK VEL: 65 CM/S
DOP CALC LVOT STROKE VOLUME: 75 CM3
DOP CALC MV VTI: 28.31 CM
DOP CALC RVOT PEAK VEL: 0.7 M/S
DOP CALCLVOT PEAK VEL VTI: 14.6 CM
E/E' RATIO (AVERAGE): 7.9
E/E' RATIO: 8.1
EJECTION FRACTION: 47 %
ERAP: 15 MMHG
INTERVENTRICULAR SEPTUM: 0.9 CM (ref 0.6–1.1)
IVC PROX: 2.96 CM
LA AREA A4C SYSTOLE: 103 CM3
LEFT ATRIUM SIZE: 5.33 CM
LEFT ATRIUM VOLUME INDEX: 44 ML/M2
LEFT ATRIUM VOLUME: 120 CM3
LEFT INTERNAL DIMENSION IN SYSTOLE: 3.7 CM (ref 2.1–4)
LEFT VENTRICLE DIASTOLIC VOLUME: 169 CM3
LEFT VENTRICLE SYSTOLIC VOLUME: 89 CM3
LEFT VENTRICULAR INTERNAL DIMENSION IN DIASTOLE: 5.4 CM (ref 3.5–6)
LVAD-AP2: 38.4 CM2
LVAD-AP4: 37.1 CM2
LVAD-AP4: 38.3 CM2
LVAD-AP4: 39.6 CM2
LVAD-AP4: 40.2 CM2
LVAD-AP4: 42 CM2
MV DEC SLOPE: 3620 MM/S2
MV DT: 270 MS
MV E/E' LATERAL: 7.7
MV MEAN GRADIENT: 1.44 MMHG
MV PEAK E VEL: 92.1 CM/S
MV PEAK GRADIENT: 5 MMHG
MV STENOSIS PRESSURE HALF TIME: 0.09 S
MV VALVE AREA P 1/2 METHOD: 2.56 CM2
MV VMAX: 109 CM/S
MVA (VTI): 2.66 CM2
PADP: 1.69 MMHG
POSTERIOR WALL: 1 CM (ref 0.6–1.1)
PR END VMAX: 65 CM/S
PULM VEIN S/D RATIO: 0.6
PV PEAK D VEL: 64 CM/S
PV PEAK GRADIENT: 4.49 MMHG
PV PEAK S VEL: 41 CM/S
PV VMAX: 1.06 M/S
RA AREA: 49.5 CM2
RH ABDOMINAL AORTA: 3.12 CM
RH CV ECHO AV VALVE AREA VEL: 2.5 CM2
RH CV ECHO AV VALVE AREA VTI: 2.5 CM2
RH LVOT PEAK VELOCITY REST: 0.7 M/S
RIGHT VENTRICULAR INTERNAL DIMENSION IN DIASTOLE: 3.8 CM
RV AP4 BASE: 6.3 CM
S': 12.4 CM/S
SINUS: 3.3 CM
STJ: 3.6 CM
TDI: 11.3 CM/S
TDILATERAL: 12 CM/S
TR MAX PG: 35.28 MMHG
TRICUSPID ANNULAR PLANE SYSTOLIC EXCURSION: 2.5 CM
TRICUSPID VALVE PEAK REGURGITATION VELOCITY: 2.97 M/S
TV REST PULMONARY ARTERY PRESSURE: 50 MMHG

## 2024-02-21 PROCEDURE — 93306 TTE W/DOPPLER COMPLETE: CPT

## 2024-02-21 PROCEDURE — 93306 TTE W/DOPPLER COMPLETE: CPT | Mod: 26 | Performed by: INTERNAL MEDICINE

## 2024-02-23 ENCOUNTER — OFFICE VISIT (OUTPATIENT)
Dept: DERMATOLOGY | Facility: CLINIC | Age: 64
End: 2024-02-23
Payer: MEDICARE

## 2024-02-23 DIAGNOSIS — L21.9 SEBORRHEIC DERMATITIS: ICD-10-CM

## 2024-02-23 DIAGNOSIS — L30.9 DERMATITIS: Primary | ICD-10-CM

## 2024-02-23 PROCEDURE — 99213 OFFICE O/P EST LOW 20 MIN: CPT | Performed by: STUDENT IN AN ORGANIZED HEALTH CARE EDUCATION/TRAINING PROGRAM

## 2024-02-23 PROCEDURE — G0463 HOSPITAL OUTPT CLINIC VISIT: HCPCS | Mod: PO | Performed by: STUDENT IN AN ORGANIZED HEALTH CARE EDUCATION/TRAINING PROGRAM

## 2024-02-23 RX ORDER — TACROLIMUS 1 MG/G
OINTMENT TOPICAL
Qty: 30 G | Refills: 3 | Status: SHIPPED | OUTPATIENT
Start: 2024-02-23

## 2024-02-23 NOTE — PROGRESS NOTES
"Subjective   HPI  Brando Sexton is a 64 y.o. male without personal history of skin cancer,  . presenting with a chief complaint of tender skin, and burning to his beard area after using ketoconazole shampoo 1%. Patient requests a lesion specific exam. Patient was seen 02/02 and was prescribed triamcinolone ointment and triamcinolone cream for atopic dermatitis on his lower back, bilateral axilla, and occasionally on his arms and patient reports this works very well for him. Patient was also prescribed ketoconazole shampoo 2% for seborrheic dermatitis and reports his pharmacy did not have the 2% so he got the 1% OTC. Patient reports that the ketoconazole gave him 7/10 pain around his upper lip, corners of the mouth and chin area, other areas were 4/10 pain. Patient reports after speaking to his wife about what was prescribed at his last appointment, that he was also prescribed this by a dermatologist in [place] 20+ years ago and had the same reaction of burning, dryness and pain. Patient reports suing this [place] provider 20+ years ago because he did not feel as though his condition was treated properly. Patient reports the [place] provider was terminated along with the  of the case as it was wrongfully handled. Patient reports [place] provider demanded his nurse to \"get rid of\" his chart, but the nurse made a copy of it before she deleted it, therefore patient was able to obtain his record from said nurse before seeing an alternative dermatologist in [place]. Patient reports this dermatologist told him to put \"cortisone cream\" on the area to help with the pain, so that is what he's been doing for 20 years. He is reluctant to try anything else at this point due to his history with dermatologists, but would like to see Dr. Esteves today.     Otherwise denies any lesions with associated rapid growth, pigment change, or spontaneous bleeding. Denies any other concerns or acute changes to baseline " health.    Review of Systems  A 12 point review of systems was performed and negative aside from discussed in HPI.    Objective   Physical Examination  Vital Signs There were no vitals taken for this visit.    General: Awake, alert and oriented x 3; no apparent distress  Skin: A localized examination of the face was performed and notable for increased red tone and secondary greasy scaling.      Assessment and Plan  1. Seborrheic dermatitis  Chronic, persistent, not optimally controlled. Adverse reaction to ketoconazole shampoo with reported burning, remembered history of same reaction after last visit. Filed under patient chart for adverse reactions. Vanicream dandruff shampoo would be a good alternative. Patient declines and notes he has reacted to Tgel washes as well. He would like to hold washes and continue topical hydrocortisone as needed for flares. Notes he will call if concerns.  - tacrolimus (PROTOPIC) 0.1 % ointment; Apply thin layers over rash twice daily as needed. Safe to use on face. Safe to use daily. If this burns, mix with vaseline when you apply.  Dispense: 30 g; Refill: 3    2. Dermatitis  Chronic, persistent, concern for topical steroid dependence on the face with chronic use of hydrocortisone. At length discussion regarding risks of continuing with topical steroids, advised transition to tacrolimus as outline below to taper off medication and minimize rebound flares. Despite at length discussion of risks, patient states he would like to continue HTC another week and if no improvement he will consider starting the tacrolimus. Eczematous rash on trunk and extremities resolved, and well controlled with triamcinolone as needed for flares. Will continue with gentle skin care regimen.  - tacrolimus (PROTOPIC) 0.1 % ointment; Apply thin layers over rash twice daily as needed. Safe to use on face. Safe to use daily. If this burns, mix with vaseline when you apply.  Dispense: 30 g; Refill: 3    Note:  Concern patient continued on many extensively detailed tangents and personal stories regarding his skin condition including the legal action against a previous dermatologist. Despite our extensive discussion of risks of continued topical steroid use, patient was in disagreement and stated he would continue. Patient acknowledged he did not disclose previous reaction to ketoconazole shampoo at last visit. I expressed agreement to completely discontinue both Rx and OTC medicated washes that were not tolerated.      Procedure(s)             Return if symptoms worsen or fail to improve.  Follow-up as directed above or as needed for any new or changing lesions or skin changes of concern.    Chiara Esteves MD

## 2024-02-26 ENCOUNTER — TELEPHONE (OUTPATIENT)
Dept: DERMATOLOGY | Facility: CLINIC | Age: 64
End: 2024-02-26
Payer: MEDICARE

## 2024-02-26 NOTE — TELEPHONE ENCOUNTER
Patient called to let Dr. Esteves know that he is doing much better, the medication is working well. Thanks

## 2024-03-14 ENCOUNTER — HOSPITAL ENCOUNTER (EMERGENCY)
Facility: HOSPITAL | Age: 64
Discharge: HOME | End: 2024-03-14
Attending: EMERGENCY MEDICINE
Payer: MEDICARE

## 2024-03-14 VITALS
HEART RATE: 68 BPM | DIASTOLIC BLOOD PRESSURE: 69 MMHG | RESPIRATION RATE: 18 BRPM | SYSTOLIC BLOOD PRESSURE: 118 MMHG | OXYGEN SATURATION: 97 % | WEIGHT: 315 LBS | TEMPERATURE: 97.6 F | HEIGHT: 77 IN | BODY MASS INDEX: 37.19 KG/M2

## 2024-03-14 DIAGNOSIS — S60.459A SUBUNGUAL FOREIGN BODY OF FINGER, INITIAL ENCOUNTER: Primary | ICD-10-CM

## 2024-03-14 PROCEDURE — 99283 EMERGENCY DEPT VISIT LOW MDM: CPT | Performed by: EMERGENCY MEDICINE

## 2024-03-14 PROCEDURE — 99282 EMERGENCY DEPT VISIT SF MDM: CPT | Performed by: EMERGENCY MEDICINE

## 2024-03-14 ASSESSMENT — ENCOUNTER SYMPTOMS
FEVER: 0
WOUND: 1
CHILLS: 0

## 2024-03-15 NOTE — DISCHARGE INSTRUCTIONS
1) We were able to remove the wooden splinter from beneath your fingernail.  It appears to be intact.  2) apply bacitracin and a Band-Aid to your fingertip twice daily for the next 1 to 2 days.  3) if any further problems, see your PCP for recheck.

## 2024-03-15 NOTE — ED PROVIDER NOTES
HPI:  Chief Complaint   Patient presents with    Foreign Body     4th digit left hand splinter under nail tonight from leg of end table at home         Jc is a 64-year-old white male complaining of a wooden splinter beneath his left fourth fingernail sustained approximately 30 minutes prior to arrival.        HISTORY:  Past Medical History:   Diagnosis Date    Atrial fibrillation (CMS/HCC)     COVID-19 01/14/2022    Gout     Headache 10/17/2023    Injury of knee 10/17/2023    Pain in joint, shoulder region 01/25/2012    Note: Unchanged    Psoriasis     Sprain of rotator cuff capsule 07/12/2011    Note: Unchanged       Past Surgical History:   Procedure Laterality Date    CARDIOVERSION  03/2010    COLONOSCOPY  01/01/1995    ORTHOPEDIC SURGERY Bilateral     Knee Scope    OTHER SURGICAL HISTORY Right     Orthopedic:Rotator cuff x2 Right shoulder       Family History   Problem Relation Age of Onset    Heart disease Mother     Colon cancer Mother     Heart disease Father         MI at age 87    Other Father         Crohns    Psoriasis Father     Asthma Father     Diabetes type II Father's Sister     Heart disease Father's Brother        Social History     Tobacco Use    Smoking status: Former    Smokeless tobacco: Former   Vaping Use    Vaping Use: Never used   Substance Use Topics    Alcohol use: No    Drug use: Never         ROS:  Review of Systems   Constitutional:  Negative for chills and fever.   Skin:  Positive for wound.       PE:  ED Triage Vitals [03/14/24 2114]   Temp Heart Rate Resp BP SpO2   36.4 °C (97.6 °F) 73 18 136/99 96 %      Mean BP (mmHg) Temp Source Heart Rate Source Patient Position BP Location   104 Oral -- -- --      FiO2 (%)       --           Physical Exam    Temp and vital signs are all within normal limits.  In general, the patient is a large, middle-aged white male who is reclining on the stretcher in no acute distress.    Exam of the left hand shows the fingernails to be trimmed  exceedingly close.  There is a dark foreign body that can be seen under the very end of the nail plate of the fourth digit.  ED LABS:  Labs Reviewed - No data to display      ED IMAGES:  No orders to display       ED PROCEDURES:  Procedures    ED COURSE:    After a lengthy search, splinter forceps were located and I was able to remove a 1 cm long piece of dark brown wood from beneath the nail plate.  This appears to be intact.  A minimal amount of bleeding ensued.  The fingertip was then soaked in warm water followed by a bacitracin and Band-Aid dressing.    I discussed aftercare at length with the patient and he voiced understanding of his discharge instructions.  I answered his questions prior to departure.       Sepsis Quality Bundle           MDM:  Medical Decision Making      Final diagnoses:   [S68.454Q] Subungual foreign body of finger, initial encounter     3/14/2024 10:39 PM                                        Sha Wilcox MD  03/14/24 5103

## 2024-03-18 DIAGNOSIS — I50.22 CHRONIC SYSTOLIC CONGESTIVE HEART FAILURE (CMS/HCC): Primary | ICD-10-CM

## 2024-03-18 DIAGNOSIS — I48.19 PERSISTENT ATRIAL FIBRILLATION (CMS/HCC): Chronic | ICD-10-CM

## 2024-03-19 ENCOUNTER — LAB (OUTPATIENT)
Dept: LAB | Facility: CLINIC | Age: 64
End: 2024-03-19
Payer: MEDICARE

## 2024-03-19 ENCOUNTER — OFFICE VISIT (OUTPATIENT)
Dept: CARDIOLOGY | Facility: CLINIC | Age: 64
End: 2024-03-19
Payer: MEDICARE

## 2024-03-19 ENCOUNTER — ANTICOAGULATION VISIT (OUTPATIENT)
Dept: PHARMACY | Facility: HOSPITAL | Age: 64
End: 2024-03-19
Payer: MEDICARE

## 2024-03-19 VITALS
DIASTOLIC BLOOD PRESSURE: 62 MMHG | WEIGHT: 315 LBS | OXYGEN SATURATION: 94 % | BODY MASS INDEX: 38.36 KG/M2 | HEIGHT: 76 IN | SYSTOLIC BLOOD PRESSURE: 120 MMHG | HEART RATE: 80 BPM

## 2024-03-19 DIAGNOSIS — I50.22 CHRONIC SYSTOLIC CONGESTIVE HEART FAILURE (CMS/HCC): ICD-10-CM

## 2024-03-19 DIAGNOSIS — Z91.89 AT RISK FOR OBSTRUCTIVE SLEEP APNEA: ICD-10-CM

## 2024-03-19 DIAGNOSIS — R60.9 EDEMA, UNSPECIFIED TYPE: ICD-10-CM

## 2024-03-19 DIAGNOSIS — I48.19 PERSISTENT ATRIAL FIBRILLATION (CMS/HCC): Primary | Chronic | ICD-10-CM

## 2024-03-19 DIAGNOSIS — I48.19 PERSISTENT ATRIAL FIBRILLATION (CMS/HCC): Chronic | ICD-10-CM

## 2024-03-19 DIAGNOSIS — I50.20 HEART FAILURE WITH REDUCED EJECTION FRACTION (CMS/HCC): ICD-10-CM

## 2024-03-19 DIAGNOSIS — G47.30 SLEEP APNEA, UNSPECIFIED TYPE: ICD-10-CM

## 2024-03-19 DIAGNOSIS — I50.22 CHRONIC SYSTOLIC HEART FAILURE (CMS/HCC): Primary | ICD-10-CM

## 2024-03-19 DIAGNOSIS — I50.22 CHRONIC SYSTOLIC HEART FAILURE (CMS/HCC): ICD-10-CM

## 2024-03-19 DIAGNOSIS — I10 ESSENTIAL HYPERTENSION: Chronic | ICD-10-CM

## 2024-03-19 LAB
ALBUMIN SERPL-MCNC: 4 G/DL (ref 3.5–5.3)
ALP SERPL-CCNC: 96 U/L (ref 45–115)
ALT SERPL-CCNC: 16 U/L (ref 7–52)
ANION GAP SERPL CALC-SCNC: 7 MMOL/L (ref 3–11)
AST SERPL-CCNC: 21 U/L
BILIRUB SERPL-MCNC: 0.83 MG/DL (ref 0.2–1.4)
BUN SERPL-MCNC: 33 MG/DL (ref 7–25)
CALCIUM ALBUM COR SERPL-MCNC: 8.8 MG/DL (ref 8.6–10.3)
CALCIUM SERPL-MCNC: 8.8 MG/DL (ref 8.6–10.3)
CHLORIDE SERPL-SCNC: 110 MMOL/L (ref 98–107)
CO2 SERPL-SCNC: 24 MMOL/L (ref 21–32)
CREAT SERPL-MCNC: 1.56 MG/DL (ref 0.7–1.3)
EGFRCR SERPLBLD CKD-EPI 2021: 49 ML/MIN/1.73M*2
ERYTHROCYTE [DISTWIDTH] IN BLOOD BY AUTOMATED COUNT: 13.7 % (ref 11.5–15)
FERRITIN SERPL-MCNC: 248.2 NG/ML (ref 24–336)
GLUCOSE SERPL-MCNC: 97 MG/DL (ref 70–105)
HCT VFR BLD AUTO: 45.3 % (ref 38–50)
HGB BLD-MCNC: 15.3 G/DL (ref 13.2–17.2)
INR BLD: 2
IRON SATN MFR SERPL: 29 % (ref 13–50)
IRON SERPL-MCNC: 93 UG/DL (ref 50–175)
MAGNESIUM SERPL-MCNC: 2.1 MG/DL (ref 1.8–2.4)
MCH RBC QN AUTO: 32.5 PG (ref 29–34)
MCHC RBC AUTO-ENTMCNC: 33.7 G/DL (ref 32–36)
MCV RBC AUTO: 96.3 FL (ref 82–97)
NT-PROBNP SERPL-MCNC: 1365 NG/L
PLATELET # BLD AUTO: 193 10*3/UL (ref 130–350)
PMV BLD AUTO: 8.4 FL (ref 6.9–10.8)
POTASSIUM SERPL-SCNC: 4.5 MMOL/L (ref 3.5–5.1)
PROT SERPL-MCNC: 6.6 G/DL (ref 6–8.3)
PROTHROMBIN TIME: 22.5 SECONDS (ref 9.4–12.5)
RBC # BLD AUTO: 4.7 10*6/ΜL (ref 4.1–5.8)
SODIUM SERPL-SCNC: 141 MMOL/L (ref 135–145)
TIBC SERPL-MCNC: 319 UG/DL (ref 250–450)
TSH SERPL DL<=0.05 MIU/L-ACNC: 1.88 UIU/ML (ref 0.34–4.82)
UIBC SERPL-MCNC: 226 UG/DL (ref 155–355)
WBC # BLD AUTO: 6.7 10*3/UL (ref 3.7–9.6)

## 2024-03-19 PROCEDURE — 84443 ASSAY THYROID STIM HORMONE: CPT

## 2024-03-19 PROCEDURE — 85610 PROTHROMBIN TIME: CPT

## 2024-03-19 PROCEDURE — G0463 HOSPITAL OUTPT CLINIC VISIT: HCPCS | Performed by: INTERNAL MEDICINE

## 2024-03-19 PROCEDURE — 80053 COMPREHEN METABOLIC PANEL: CPT

## 2024-03-19 PROCEDURE — 83880 ASSAY OF NATRIURETIC PEPTIDE: CPT

## 2024-03-19 PROCEDURE — 36415 COLL VENOUS BLD VENIPUNCTURE: CPT

## 2024-03-19 PROCEDURE — 83735 ASSAY OF MAGNESIUM: CPT

## 2024-03-19 PROCEDURE — 99205 OFFICE O/P NEW HI 60 MIN: CPT | Performed by: INTERNAL MEDICINE

## 2024-03-19 PROCEDURE — 82728 ASSAY OF FERRITIN: CPT

## 2024-03-19 PROCEDURE — 93005 ELECTROCARDIOGRAM TRACING: CPT | Performed by: INTERNAL MEDICINE

## 2024-03-19 PROCEDURE — 85027 COMPLETE CBC AUTOMATED: CPT

## 2024-03-19 PROCEDURE — 83550 IRON BINDING TEST: CPT

## 2024-03-19 RX ORDER — CARVEDILOL 6.25 MG/1
6.25 TABLET ORAL 2 TIMES DAILY
Qty: 60 TABLET | Refills: 3 | Status: SHIPPED | OUTPATIENT
Start: 2024-03-19 | End: 2024-03-20 | Stop reason: SDUPTHER

## 2024-03-19 RX ORDER — SPIRONOLACTONE 25 MG/1
12.5 TABLET ORAL DAILY
Qty: 15 TABLET | Refills: 11 | Status: SHIPPED | OUTPATIENT
Start: 2024-03-19 | End: 2024-12-09

## 2024-03-19 RX ORDER — SACUBITRIL AND VALSARTAN 24; 26 MG/1; MG/1
1 TABLET, FILM COATED ORAL 2 TIMES DAILY
Qty: 60 TABLET | Refills: 3 | Status: SHIPPED | OUTPATIENT
Start: 2024-03-19 | End: 2024-04-10

## 2024-03-19 ASSESSMENT — ENCOUNTER SYMPTOMS
DOUBLE VISION: 0
DYSURIA: 0
LIGHT-HEADEDNESS: 0
PALPITATIONS: 0
DECREASED APPETITE: 0
SHORTNESS OF BREATH: 0
FALLS: 0
NERVOUS/ANXIOUS: 0
DIZZINESS: 0
IRREGULAR HEARTBEAT: 0
NAUSEA: 0
BRUISES/BLEEDS EASILY: 0
NEAR-SYNCOPE: 0
VOMITING: 0
FEVER: 0
HALLUCINATIONS: 0
ORTHOPNEA: 0
DIARRHEA: 0

## 2024-03-19 NOTE — PROGRESS NOTES
[facility]     CARDIOLOGY OUTPATIENT CONSULT NOTE       Chief Complaint   Patient presents with    Congestive Heart Failure       HPI  Subjective   HPI:  Brando Sexton is a 64 y.o. male, who is here for a new heart failure consult. He has a history of atrial fibrillation which sounds like it is a paroxysmal in nature.  He had a normal regular rhythm edinson auscultation and with palpation.  He is on Coumadin for anticoagulation and carvedilol for rate control. His blood pressure is controlled with Lisinopril 10 mg a day and Carvedilol 3.125 mg twice daily.  He has no personal history of heart attack or stroke there was a note of a TIA in his medical record but he denies that this is true. Consultation requested by Dr. Gordon for an opinion regarding CHF and my final recommendations will be communicated back to the requesting physician by way of shared Medical record or letter via US mail.    He has a past medical history significant for  has a past medical history of Atrial fibrillation (CMS/[facility]), COVID-19 (01/14/2022), Gout, Headache (10/17/2023), Injury of knee (10/17/2023), Pain in joint, shoulder region (01/25/2012), Psoriasis, and Sprain of rotator cuff capsule (07/12/2011)..  Device:  NA    He states that his breathing ability and energy level, in general, are better since he was seen by Dr. Gordon.  He can ambulate one block.  He reports no dyspnea on exertion.  He is not able to lay flat on the bed. He does not have paroxysmal nocturnal dyspnea, does not have fatigue, and does not have edema. He reports no chest pain, no palpitations, no lightheadedness, and no syncope.   Right upper quadrant tenderness:  no.  His appetite is Good. He is on a low sodium diet.  He always follows his diet.  He is not on fluid restriction.  He is moderately active.      The patient presents today for a new heart failure consult. He is retired and no longer working. He states that  he knew he had A-Fib in 2017. He denies having sleep apnea and he is not using a CPAP mask.  He had a sleep study done 20 years ago. He was drinking Red Bull to keep him self awake when he was driving.  Currently, he does not have his license and he is not driving. He states that he consumes very little salt in his diet. He denies any symptoms of SOB or his heart racing. He is not able to lay flat on the bed. He denies any leg swelling and he has been taking Furosemide. He is not able to walk long distances because of knee pain. He is planning to consult a nutritionist to help him lose weight. He denies having any recent stress test or cardiac cath. He says that he has a very sedentary lifestyle. He denies having any open wounds. He denies checking his BP at home.  Overall patient states that he is feeling better and okay, his main complaint today is that he will want to go back to work as a .  He is requesting a permission or a DOT letter.  Denies A-fib workup in the past except 1 dose of cardioversion.      Medication reconciliation completed:  yes.      CARDIAC PROBLEM LIST:  No specialty comments available.     Past Medical History:   Diagnosis Date    Atrial fibrillation (CMS/[facility])     COVID-19 01/14/2022    Gout     Headache 10/17/2023    Injury of knee 10/17/2023    Pain in joint, shoulder region 01/25/2012    Note: Unchanged    Psoriasis     Sprain of rotator cuff capsule 07/12/2011    Note: Unchanged     Past Surgical History:   Procedure Laterality Date    CARDIOVERSION  03/2010    COLONOSCOPY  01/01/1995    ORTHOPEDIC SURGERY Bilateral     Knee Scope    OTHER SURGICAL HISTORY Right     Orthopedic:Rotator cuff x2 Right shoulder       Allergies as of 03/19/2024 - Reviewed 03/19/2024   Allergen Reaction Noted    Allopurinol  01/18/2022    Cephalexin  01/18/2022    Nizoral [ketoconazole] Other (see comments) 02/23/2024     Current Outpatient Medications   Medication Sig Dispense Refill    vitamin  E,dl-alpha tocopherol, (VITAMIN E, BULK, MISC) 1 tablet daily      tacrolimus (PROTOPIC) 0.1 % ointment Apply thin layers over rash twice daily as needed. Safe to use on face. Safe to use daily. If this burns, mix with vaseline when you apply. 30 g 3    triamcinolone (KENALOG) 0.1 % cream Apply thin layer over affected areas twice daily for two weeks then twice weekly for maintenance. OK to increase use again for flares. Avoid face, armpits, and groin. 454 g 3    triamcinolone (KENALOG) 0.1 % ointment Apply thin layer over affected areas twice daily for two weeks then twice weekly for maintenance. OK to increase use again for flares. Avoid face, armpits and groin. 454 g 3    febuxostat (ULORIC) 80 mg tablet Take 1 tablet (80 mg total) by mouth daily 90 tablet 3    furosemide (LASIX) 20 mg tablet Take 1 tablet (20 mg total) by mouth daily 90 tablet 3    warfarin (COUMADIN) 4 mg tablet Alternate 2mg with 4mg tablet every other day 45 tablet 3    warfarin (COUMADIN) 2 mg tablet Alternate 2mg with 4mg tablet every other day 45 tablet 3    sacubitriL-valsartan (Entresto) 24-26 mg tablet Take 1 tablet by mouth 2 (two) times a day 60 tablet 3    spironolactone (ALDACTONE) 25 mg tablet Take 0.5 tablets (12.5 mg total) by mouth daily 15 tablet 11    carvediloL (COREG) 6.25 mg tablet Take 1 tablet (6.25 mg total) by mouth 2 (two) times a day Unknown mg 60 tablet 3    ascorbic acid (VITAMIN C ORAL) Take by mouth       No current facility-administered medications for this visit.       Family History   Problem Relation Age of Onset    Heart disease Mother     Colon cancer Mother     Heart disease Father         MI at age 87    Other Father         Crohns    Psoriasis Father     Asthma Father     Diabetes type II Father's Sister     Heart disease Father's Brother      Social History     Tobacco Use    Smoking status: Former    Smokeless tobacco: Former   Vaping Use    Vaping Use: Never used   Substance Use Topics    Alcohol use:  "No    Drug use: Never       Review of Systems  Review of Systems   Constitutional: Negative for decreased appetite and fever.   HENT:  Negative for congestion.    Eyes:  Negative for double vision.   Cardiovascular:  Negative for chest pain, dyspnea on exertion, irregular heartbeat, leg swelling/pain, near-syncope, orthopnea and palpitations.   Respiratory:  Negative for shortness of breath and sleep apnea.    Hematologic/Lymphatic: Does not bruise/bleed easily.   Musculoskeletal:  Positive for joint pain. Negative for falls.        Positive knee pain   Gastrointestinal:  Negative for diarrhea, nausea and vomiting.   Genitourinary:  Negative for dysuria.   Neurological:  Negative for dizziness and light-headedness.   Psychiatric/Behavioral:  Negative for hallucinations. The patient is not nervous/anxious.      Objective     /62 (Patient Position: Sitting)   Pulse 80   Ht 1.93 m (6' 4\")   Wt (!) 145.6 kg (321 lb)   SpO2 94%   BMI 39.07 kg/m²     Physical Exam  Constitutional:       General: He is not in acute distress.     Appearance: Normal appearance. He is not ill-appearing.   HENT:      Head: Normocephalic and atraumatic.      Nose: Nose normal.      Mouth/Throat:      Mouth: Mucous membranes are moist.   Eyes:      General: No scleral icterus.  Cardiovascular:      Rate and Rhythm: Normal rate and regular rhythm.      Pulses: Normal pulses.      Heart sounds: No murmur heard.  Pulmonary:      Effort: Pulmonary effort is normal. No respiratory distress.      Breath sounds: Normal breath sounds. No wheezing or rales.   Chest:      Chest wall: No tenderness.   Abdominal:      General: Abdomen is flat. Bowel sounds are normal.      Palpations: Abdomen is soft.   Musculoskeletal:         General: No swelling or tenderness. Normal range of motion.      Right lower leg: No edema.      Left lower leg: No edema.      Comments: Minimal chronic edema and skin changes probably from venous stasis   Skin:     " General: Skin is warm.      Capillary Refill: Capillary refill takes less than 2 seconds.   Neurological:      General: No focal deficit present.      Mental Status: He is alert and oriented to person, place, and time.      Motor: No weakness.      Gait: Gait normal.   Psychiatric:         Mood and Affect: Mood normal.         Data Review:   Sodium   Date Value Ref Range Status   03/19/2024 141 135 - 145 mmol/L Final     Potassium   Date Value Ref Range Status   03/19/2024 4.5 3.5 - 5.1 MMOL/L Final     Chloride   Date Value Ref Range Status   03/19/2024 110 (H) 98 - 107 mmol/L Final     CO2   Date Value Ref Range Status   03/19/2024 24 21 - 32 mmol/L Final     BUN   Date Value Ref Range Status   03/19/2024 33 (H) 7 - 25 mg/dL Final     Creatinine   Date Value Ref Range Status   03/19/2024 1.56 (H) 0.70 - 1.30 mg/dL Final     Glucose   Date Value Ref Range Status   03/19/2024 97 70 - 105 mg/dL Final     Calcium   Date Value Ref Range Status   03/19/2024 8.8 8.6 - 10.3 mg/dL Final     Total CK   Date Value Ref Range Status   08/18/2022 105 39 - 308 U/L Final     BNP   Date Value Ref Range Status   08/10/2015 192 (H) 0 - 99 pg/mL    08/10/2015 192 (H) 0 - 99 pg/mL      Comment:     B-TYPE NATRIURETIC PEPTIDE INTERPRETIVE DATA: A cutoff of  100 pg/mL has been demonstrated to provide the maximal  combination of sensitivity, specificity, and negative  predictive value for contributing to the diagnosis of  congestive heart failure (CHF).  A BNP value greater than or  equal to 100 pg/mL is consistent with a diagnosis of CHF in  the appropriate clinical setting.  False positive results  are more common in females greater than 75 years of age.  Blood concentrations of natriuretic peptides may also be  elevated in patients with myocardial infarction and in  patients who are candidates for or are undergoing renal  dialysis.       Lipids:    Lab Results   Component Value Date    CHOL 202 (H) 12/20/2023    CHOL 223 (H)  08/10/2015     Lab Results   Component Value Date    HDL 50 12/20/2023    HDL 57 08/10/2015     Lab Results   Component Value Date    LDLCALC 131 (H) 12/20/2023    LDLCALC 146 (H) 08/10/2015     Lab Results   Component Value Date    TRIG 103 12/20/2023    TRIG 101 08/10/2015        TSH:   Lab Results   Component Value Date    TSH 1.878 03/19/2024     Magnesium:   Lab Results   Component Value Date    MG 2.1 03/19/2024     Protime-INR:   Lab Results   Component Value Date    PT 22.5 (H) 03/19/2024    INR 2.0 (H) 03/19/2024     A1c:   Lab Results   Component Value Date    HGBA1C 5.7 12/27/2023       Reviewed and interpreted by me:      Electrocardiogram (03/19/2024)    Atrial fibrillation  Inferior infarct, old  QRSD: 101  QRS: -56  QT: 403  HR: 73    Echocardiogram:  Results for orders placed in visit on 02/21/24    US Echo complete (02/21/2024)    Interpretation Summary    Normal left ventricular cavity size and wall thickness.    Biplane ejection fraction is 47%.    Mild global left ventricular systolic dysfunction.    Unable to assess left ventricular diastolic function due to atrial fibrillation.    Normal left ventricular filling pressure.    The right ventricle is moderately dilated.  RV systolic function is normal.    The left atrium is moderately dilated.    The right atrium is severely dilated.    Moderately elevated central venous pressure and IVC diameter is >21 mm (6-10 mmHg).    No pericardial effusion.    The ascending aorta is mildly dilated.  Diameter 4.1 cm.    Mild regurgitation of the mitral valve is noted.    Right ventricular systolic pressure is estimated at 50 mmHg.    Moderate tricuspid regurgitation.    Mild to moderate pulmonary hypertension is present.  The estimated PA systolic pressure is also 50 mmHg.    Comment: There are no old studies available for comparison purposes.       Stress Test:  No results found for this or any previous visit.       Cardiac Catheterization:  None      X-ray  chest 2 views (11/26/2023)     IMPRESSION:  No acute cardiopulmonary findings.    Assessment/Plan   Presents for Chronic systolic heart failure. EF: 47%  NYHA Functional Class:  III  Stage:  C heart failure  'NT pro BNP (08/10/15): 192  TSH (08/10/15): 2.32  Hgb (11/26/23): 13.8  Primary hypertension, controlled  Paroxysmal atrial fibrillation (On Coumadin for anticoagulation and Carvedilol for rate control) status post failed cardioversion?  Hyperglycemia   Obesity: BMI 39.07  Possible sleep apnea      Plan  The patient presents today for a new heart failure consult. He is retired and no longer working. He states that he knew he had A-Fib in 2017. He denies having sleep apnea and he is not using a CPAP mask.  He had a sleep study done 20 years ago. He was drinking Red Bull to keep him self awake when he was driving.  Currently, he does not have his license and he is not driving. He states that he consumes very little salt in his diet. He denies any symptoms of SOB or his heart racing. He is not able to lay flat on the bed. He denies any leg swelling and he has been taking Furosemide. He is not able to walk long distances because of knee pain. He is planning to consult a nutritionist to help him lose weight. He denies having any recent stress test or cardiac cath. He says that he has a very sedentary lifestyle. He denies having any open wounds. He denies checking his BP at home.  From my standpoint I do not see any concerns for the patient to be able to drive again.  The only issue that I have is after reviewing his images, EKG does show a possible inferior MI, no obvious wall motion abnormality noted on the echo.  We will need to complete ischemic workup to make sure that there is drop in EF is not ischemic.  His drop in EF can be multifactorial rapid A-fib, ischemic cardiomyopathy or other arrhythmias.  Will need blood work to rule out thyroid stuff.  He denies having sleep apnea but he got tested 20 years ago.   He will need a home sleep apnea test.  We will order a Holter monitor to better understand the burden of A-fib.  Since the patient is young I would recommend to being seen by EP to further optimize his EP management and see if we can get him out of A-fib possible ablation.  Today, I advised the patient to take Spironolactone 0.5 tablets (12.5 mg total) by mouth daily, Coreg take 1 tablet (6.25 mg total) by mouth 2 (two) times a day, and Entresto 24-26 mg take 1 tablet by mouth 2 (two) times a day (Start Entresto on Thursday). I advised the patient to stop Lisinopril today. We will schedule the patient for a Stress test. We will do blood work today, a repeat blood work next week, and a TSH level. I counseled the patient to consult an EP doctor for A-Fib.     Problem List Items Addressed This Visit          Cardiac and Vasculature    Essential hypertension (Chronic)    Relevant Medications    spironolactone (ALDACTONE) 25 mg tablet    carvediloL (COREG) 6.25 mg tablet    Persistent atrial fibrillation (CMS/HCC) (Chronic)    Relevant Medications    sacubitriL-valsartan (Entresto) 24-26 mg tablet    carvediloL (COREG) 6.25 mg tablet    Other Relevant Orders    ECG 12 lead -Normal, Today (Completed)    Holter monitor 48 hour    Chronic systolic heart failure (CMS/HCC) - Primary    Relevant Medications    sacubitriL-valsartan (Entresto) 24-26 mg tablet    spironolactone (ALDACTONE) 25 mg tablet    carvediloL (COREG) 6.25 mg tablet    Other Relevant Orders    NM Walking lexiscan myocardial perfusion imaging complete    Basic metabolic panel Blood, Venous    Ambulatory referral to Cardiac Electrophysiology    Protime-INR Blood, Venous (Completed)       Symptoms and Signs    Edema     Other Visit Diagnoses       Heart failure with reduced ejection fraction (CMS/HCC)        Relevant Medications    sacubitriL-valsartan (Entresto) 24-26 mg tablet    carvediloL (COREG) 6.25 mg tablet    At risk for obstructive sleep apnea         Relevant Orders    Home sleep test    Sleep apnea, unspecified type        Relevant Orders    Home sleep test             PLAN and Patient instructions:     Spironolactone take 0.5 tablets (12.5 mg total) by mouth daily  Coreg take 1 tablet (6.25 mg total) by mouth 2 (two) times a day   Entresto 24-26 mg take 1 tablet by mouth 2 (two) times a day (Start Entresto on Thursday)  I advised the patient to stop Lisinopril today  We will schedule the patient for a Stress test  We will do blood work today, a repeat blood work next week, and a TSH level  I counseled the patient to consult an EP doctor for A-Fib   Less than 2 g salt restricted diet and less than 2 L fluid restriction.  Counseled patient to continue weighing himself every day and maintaining a log, he/she should contact the clinic if he gains more than 3 to 4 pounds over the course of 1 to 2 days or 5 pounds in a week.  Check your blood pressure ideally twice a day and write a log.   Keep moving and activity as tolerated  Please call us with any question or concerns [phone]       COUNSELING:   We discussed the following non-pharmacological measures during this visit:  Smoking and alcohol abstinence/cessation, if applicable.  Dietary and medication compliance (in particular, salt restriction)  Monitoring daily weights and blood pressures  Exercise regimen (walking)    Heart Failure Education Booklet:  Given today  I spent 60 minutes in this visit, with more than 50% of the time devoted to patient counseling.    Follow-up appointment with in 1 month or sooner if needed.     A voice recognition program was used to aid in medical record documentation. Sometimes words are printed not exactly as they were spoken. While efforts were made to carefully edit and correct any inaccuracies, some errors may be present. Errors should be taken within the context of the discussion.  Please contact our office if you need assistance interpreting this medical record or notice  any mistakes.     Edgar Walsh MD  3/19/2024  4:59 PM

## 2024-03-19 NOTE — PROGRESS NOTES
Subjective   Brando Sexton is contacted by the anticoagulation clinic for monitoring of his long term warfarin therapy. Labs were drawn today at cardiology appointment.    Today Brando reports the following:   Bleeding signs/symptoms: No   Major bleeding event: No  Thrombosis signs/symptoms: No  Thromboembolic event: No  Missed doses: No  Medication changes: Yes  Stopping lisinopril today and starting Entresto on Thursday. Also starting spironolactone 12.5 mg daily and carvedilol 6.25 mg BID today. All per cardiology appt today.  Dietary changes: No  Bacterial/viral infection: no  Other concerns: No    Current Outpatient Medications   Medication Sig Dispense Refill    vitamin E,dl-alpha tocopherol, (VITAMIN E, BULK, MISC) 1 tablet daily      sacubitriL-valsartan (Entresto) 24-26 mg tablet Take 1 tablet by mouth 2 (two) times a day 60 tablet 3    spironolactone (ALDACTONE) 25 mg tablet Take 0.5 tablets (12.5 mg total) by mouth daily 15 tablet 11    carvediloL (COREG) 6.25 mg tablet Take 1 tablet (6.25 mg total) by mouth 2 (two) times a day Unknown mg 60 tablet 3    tacrolimus (PROTOPIC) 0.1 % ointment Apply thin layers over rash twice daily as needed. Safe to use on face. Safe to use daily. If this burns, mix with vaseline when you apply. 30 g 3    triamcinolone (KENALOG) 0.1 % cream Apply thin layer over affected areas twice daily for two weeks then twice weekly for maintenance. OK to increase use again for flares. Avoid face, armpits, and groin. 454 g 3    triamcinolone (KENALOG) 0.1 % ointment Apply thin layer over affected areas twice daily for two weeks then twice weekly for maintenance. OK to increase use again for flares. Avoid face, armpits and groin. 454 g 3    febuxostat (ULORIC) 80 mg tablet Take 1 tablet (80 mg total) by mouth daily 90 tablet 3    furosemide (LASIX) 20 mg tablet Take 1 tablet (20 mg total) by mouth daily 90 tablet 3    warfarin (COUMADIN) 4 mg tablet Alternate 2mg with 4mg  tablet every other day 45 tablet 3    warfarin (COUMADIN) 2 mg tablet Alternate 2mg with 4mg tablet every other day 45 tablet 3    ascorbic acid (VITAMIN C ORAL) Take by mouth       No current facility-administered medications for this visit.         Objective     There were no vitals taken for this visit.    Assessment/Plan      Anticoagulation Summary  As of 3/19/2024      INR goal:  2.0-3.0   INR used for dosin.0 (3/19/2024)   Full warfarin instructions:  2 mg every Sun, Tue, Thu; 4 mg all other days   No change documented:  Yair Mcdaniels, PharmD   Next INR check:  2024   Priority:  Maintenance    Indications    Persistent atrial fibrillation (CMS/HCC) [I48.19]                 Anticoagulation Care Providers       Provider Role Specialty Phone number    Avery Gordon MD Referring Family Medicine [phone]            Anticoagulation therapy status: INR is stable, no dose adjustment required. Continue warfarin 2 mg Tues/Thurs/Sun and 4 mg all other days of the week. Recheck INR in 1 month.  Left voicemail message with information and call back number.       Please reference Anticoagulation episode for additional documentation.

## 2024-03-19 NOTE — PATIENT INSTRUCTIONS
Spironolactone take 0.5 tablets (12.5 mg total) by mouth daily  Coreg take 1 tablet (6.25 mg total) by mouth 2 (two) times a day   Entresto 24-26 mg take 1 tablet by mouth 2 (two) times a day (Start Entresto on Thursday)  I advised the patient to stop Lisinopril today  We will schedule the patient for a Stress test  We will do blood work today, a repeat blood work next week, and a TSH level  I counseled the patient to consult an EP doctor for A-Fib   Less than 2 g salt restricted diet and less than 2 L fluid restriction.  Counseled patient to continue weighing himself every day and maintaining a log, he/she should contact the clinic if he gains more than 3 to 4 pounds over the course of 1 to 2 days or 5 pounds in a week.  Check your blood pressure ideally twice a day and write a log.  Keep moving and activity as tolerated  Please call us with any question or concerns [phone]

## 2024-03-20 ENCOUNTER — TELEPHONE (OUTPATIENT)
Dept: CARDIOLOGY | Facility: CLINIC | Age: 64
End: 2024-03-20
Payer: MEDICARE

## 2024-03-20 DIAGNOSIS — I48.19 PERSISTENT ATRIAL FIBRILLATION (CMS/HCC): Chronic | ICD-10-CM

## 2024-03-20 RX ORDER — CARVEDILOL 6.25 MG/1
6.25 TABLET ORAL 2 TIMES DAILY
Qty: 180 TABLET | Refills: 3 | Status: SHIPPED | OUTPATIENT
Start: 2024-03-20 | End: 2025-03-20

## 2024-03-20 NOTE — TELEPHONE ENCOUNTER
"----- Message from Edgar Walsh MD sent at 3/19/2024  4:43 PM MDT -----  Please call the patient regarding his abnormal result.  His NT-proBNP is slightly elevated due to heart failure.  Creatinine appears to be slightly on the higher side.  Will recommend to continue with the medication changes that I recommended today.  Blood work in a week from now.  No indication for iron replacement therapy due to hemoglobin more than 15    Spoke with pt regarding lab results and medication regimen. Pt was questioning if it was safe to take lasix and aldactone together. Informed pt that it is safe. Pt states that he \"pees all the time now and doesn't want to be peeing more.\" Informed pt that his Pro NT BNP was elevated and had further discussion regarding the lab results. Pt states [facility] pharmacy did not receive the Coreg 6.25 BID dose. Resent script. Pt states that due to his income/investments he is not eligible for the patient assistance programs for Entresto and Jardiance. Pt states pharmacy quoted Entresto $700 and Jardiance $500. Pt states he will not be able to pay for the medications. He is planning on using the pills that he received for the 30 day free trials on Entresto and Jardiance and then stopping the medications. Informed pt that Dr Walsh would be notified. Pt verbalizes understanding of lab results. Pt will have labs drawn next week.   "

## 2024-03-22 ENCOUNTER — TELEPHONE (OUTPATIENT)
Dept: CARDIOLOGY | Facility: CLINIC | Age: 64
End: 2024-03-22
Payer: MEDICARE

## 2024-03-22 NOTE — TELEPHONE ENCOUNTER
"Called pt back regarding BP and medication regimen. Pt states his /79 15 min after taking one of his medications. Discussed with pt his medication regimen. Pt states that his PCP instructed him to take his medications 30-60min apart \"to help with his stomach because it's a lot of pills all at once.\" Pt states he starts taking one of his pills at 7am. He takes his last pill for the morning at 10am. Instructed pt to take BP at 12 noon in order to allow time for the pills to be in his system to help with his BP. Pt states he will start taking his BP at noon.     Pt also states that after taking his BP this morning and after he removed the cuff, he had \"a funny feeling in my head.\" The feeling lasted less than 30seconds. No other symptoms were reported.  Pt was concerned that the BP cuff isn't right for him due to the funny feeling in his head. Discussed with pt that it was doubtful that the sensation was caused by the BP cuff. Pt will notate his Bps and any symptoms and will report if they become consistently bothersome. Informed pt that this all will be reported to Dr Walsh and will call back with recommendations. Pt verbalizes understanding.   "

## 2024-03-28 ENCOUNTER — ANCILLARY PROCEDURE (OUTPATIENT)
Dept: CARDIOLOGY | Facility: CLINIC | Age: 64
End: 2024-03-28
Payer: MEDICARE

## 2024-03-28 DIAGNOSIS — I48.19 PERSISTENT ATRIAL FIBRILLATION (CMS/HCC): Chronic | ICD-10-CM

## 2024-03-28 PROCEDURE — 93225 XTRNL ECG REC<48 HRS REC: CPT

## 2024-04-08 ENCOUNTER — TELEPHONE (OUTPATIENT)
Dept: CARDIOLOGY | Facility: CLINIC | Age: 64
End: 2024-04-08
Payer: MEDICARE

## 2024-04-08 DIAGNOSIS — I50.22 CHRONIC SYSTOLIC HEART FAILURE (CMS/HCC): Primary | ICD-10-CM

## 2024-04-08 NOTE — TELEPHONE ENCOUNTER
Patient called wondering about his Holter results and also that he is unable to sign up for Medicare part D until the end of the year. He does not qualify for the assistance programs for Entresto. He is wondering about a medication to replace the Entresto.     Told Jc his Holter had not been formally read yet so did not have any results for him at this time.     He also had a couple questions he forgot to ask if Dr. Walsh had any time to call him.

## 2024-04-10 PROCEDURE — 93010 ELECTROCARDIOGRAM REPORT: CPT | Performed by: INTERNAL MEDICINE

## 2024-04-10 RX ORDER — LOSARTAN POTASSIUM 25 MG/1
25 TABLET ORAL DAILY
Qty: 90 TABLET | Refills: 3 | Status: SHIPPED | OUTPATIENT
Start: 2024-04-10 | End: 2025-04-10

## 2024-04-10 NOTE — TELEPHONE ENCOUNTER
Patient called back about the change in medication. Will send in Losartan 25 mg daily to his pharmacy to start when he runs out of Entresto. Patient voiced understanding.

## 2024-04-14 LAB
RH CV SUPRAVENT % TOTAL BEATS: 0 %
RH CV TOTAL BEATS: NORMAL BEATS
RH CV VENTRICULAR % TOTAL BEATS: 6.3 %
RH SUPRAVENTRICULAR BEATS: 0 BEATS
RH VENTRICULAR BEATS: NORMAL BEATS

## 2024-04-14 PROCEDURE — 93227 XTRNL ECG REC<48 HR R&I: CPT | Performed by: INTERNAL MEDICINE

## 2024-04-29 ENCOUNTER — OFFICE VISIT (OUTPATIENT)
Dept: CARDIOLOGY | Facility: CLINIC | Age: 64
End: 2024-04-29
Payer: MEDICARE

## 2024-04-29 VITALS
OXYGEN SATURATION: 94 % | SYSTOLIC BLOOD PRESSURE: 110 MMHG | BODY MASS INDEX: 37.99 KG/M2 | HEIGHT: 76 IN | WEIGHT: 312 LBS | DIASTOLIC BLOOD PRESSURE: 64 MMHG

## 2024-04-29 DIAGNOSIS — I48.19 PERSISTENT ATRIAL FIBRILLATION (CMS/HCC): Primary | Chronic | ICD-10-CM

## 2024-04-29 DIAGNOSIS — I50.22 CHRONIC SYSTOLIC HEART FAILURE (CMS/HCC): ICD-10-CM

## 2024-04-29 DIAGNOSIS — I50.22 CHRONIC SYSTOLIC HEART FAILURE (CMS/HCC): Primary | ICD-10-CM

## 2024-04-29 PROCEDURE — 99204 OFFICE O/P NEW MOD 45 MIN: CPT | Performed by: INTERNAL MEDICINE

## 2024-04-29 PROCEDURE — 93010 ELECTROCARDIOGRAM REPORT: CPT | Performed by: INTERNAL MEDICINE

## 2024-04-29 PROCEDURE — G0463 HOSPITAL OUTPT CLINIC VISIT: HCPCS | Performed by: INTERNAL MEDICINE

## 2024-04-29 PROCEDURE — 93005 ELECTROCARDIOGRAM TRACING: CPT | Performed by: INTERNAL MEDICINE

## 2024-04-29 ASSESSMENT — CHA2DS2-VASC SCORE
CHA2DS2-VASC SCORE: 5
ESTIMATED_RISK_OF_STROKE_TIA_EMBOLISM_IN_%: 10
ESTIMATED_RISK_OF_ISCHEMIC_STROKE_IN_%: 7.2

## 2024-04-29 ASSESSMENT — CHA2DS2 SCORE
HYPERTENSION: YES
DIABETES: NO
PRIOR STROKE OR TIA OR THROMBOEMBOLISM: YES
AGE IN YEARS: 65-74
SEX: MALE

## 2024-04-29 ASSESSMENT — PAIN SCALES - GENERAL: PAINLEVEL: 0-NO PAIN

## 2024-04-29 NOTE — PROGRESS NOTES
ELECTROPHYSIOLOGY CONSULT    Chief Complaint:    The primary encounter diagnosis was Persistent atrial fibrillation (CMS/HCC). A diagnosis of Chronic systolic heart failure (CMS/HCC) was also pertinent to this visit..    HPI:    Brando Sexton is a very pleasant 64 y.o. y/o male who presents with a PMH significant for   Patient Active Problem List   Diagnosis    Encounter for therapeutic drug monitoring    Benign prostatic hyperplasia without urinary obstruction    Conduction disorder of the heart    Edema    Essential hypertension    Felty's syndrome (CMS/HCC)    Gastroesophageal reflux disease    Scrotal mass    Osteoarthritis of both knees    Gouty arthropathy    Persistent atrial fibrillation (CMS/HCC)    Chronic systolic heart failure (CMS/HCC)         Brando  presents today to discuss the further evaluation of longstanding persistent atrial fibrillation.  He believes that he has been in atrial fibrillation for at least the last 7 years perhaps the last decade.  He recollects being cardioverted once about a decade ago and that he thinks he maintained sinus rhythm for a few weeks thereafter.  Since that time he has had the impression that he has been persistently in atrial fibrillation.    He notes that he is free of palpitations and has not had syncope or presyncope.  He does have some exertional fatigue but has not had orthopnea or PND.  He remains compliant with his medical therapy as prescribed and does not report side effects as a result.    Patient history items personally reviewed:     Tobacco  Allergies  Meds  Problems  Med Hx  Surg Hx  Fam Hx           Medications:    Current Outpatient Medications   Medication Sig Dispense Refill    losartan (Cozaar) 25 mg tablet Take 1 tablet (25 mg total) by mouth daily 90 tablet 3    carvediloL (COREG) 6.25 mg tablet Take 1 tablet (6.25 mg total) by mouth 2 (two) times a day Unknown mg 180 tablet 3    spironolactone (ALDACTONE) 25 mg tablet Take 0.5  tablets (12.5 mg total) by mouth daily 15 tablet 11    febuxostat (ULORIC) 80 mg tablet Take 1 tablet (80 mg total) by mouth daily 90 tablet 3    furosemide (LASIX) 20 mg tablet Take 1 tablet (20 mg total) by mouth daily 90 tablet 3    warfarin (COUMADIN) 4 mg tablet Alternate 2mg with 4mg tablet every other day 45 tablet 3    warfarin (COUMADIN) 2 mg tablet Alternate 2mg with 4mg tablet every other day 45 tablet 3    vitamin E,dl-alpha tocopherol, (VITAMIN E, BULK, MISC) 1 tablet daily      tacrolimus (PROTOPIC) 0.1 % ointment Apply thin layers over rash twice daily as needed. Safe to use on face. Safe to use daily. If this burns, mix with vaseline when you apply. 30 g 3    triamcinolone (KENALOG) 0.1 % cream Apply thin layer over affected areas twice daily for two weeks then twice weekly for maintenance. OK to increase use again for flares. Avoid face, armpits, and groin. 454 g 3    triamcinolone (KENALOG) 0.1 % ointment Apply thin layer over affected areas twice daily for two weeks then twice weekly for maintenance. OK to increase use again for flares. Avoid face, armpits and groin. 454 g 3    ascorbic acid (VITAMIN C ORAL) Take by mouth       No current facility-administered medications for this visit.          ROS:    Non contributory except as noted in the HPI    Physical Exam:    Vitals:    Vitals:    04/29/24 1418   BP: 110/64   SpO2: 94%       General:  Well developed, well nourished male in no apparent distress.  HEENT:  NC/AT, sclerae anicteric, no redness  Cardiac: Irregular rhythm, no M/R/G  Lungs:  Clear, good air entry, no wheezes or rales  Ext: radial pulses +2/4     Testing Personally Reviewed:    Echo Results    Echo Interpretation Summary    Results for orders placed in visit on 02/21/24    US Echo complete    Interpretation Summary    Normal left ventricular cavity size and wall thickness.    Biplane ejection fraction is 47%.    Mild global left ventricular systolic dysfunction.    Unable to  "assess left ventricular diastolic function due to atrial fibrillation.    Normal left ventricular filling pressure.    The right ventricle is moderately dilated.  RV systolic function is normal.    The left atrium is moderately dilated.    The right atrium is severely dilated.    Moderately elevated central venous pressure and IVC diameter is >21 mm (6-10 mmHg).    No pericardial effusion.    The ascending aorta is mildly dilated.  Diameter 4.1 cm.    Mild regurgitation of the mitral valve is noted.    Right ventricular systolic pressure is estimated at 50 mmHg.    Moderate tricuspid regurgitation.    Mild to moderate pulmonary hypertension is present.  The estimated PA systolic pressure is also 50 mmHg.    Comment: There are no old studies available for comparison purposes.       Lab Results   Component Value Date    EF 47 02/21/2024    LASIZE 5.33 02/21/2024    LAVOL 120 02/21/2024        ECG: The tracing performed in the office today shows atrial fibrillation conducted with an average ventricular rate of 98 bpm there are stigmata of prior inferior wall infarct.  The available prior ECGs also demonstrate atrial fibrillation    Cardiac Monitors: I have reviewed the 48-hour Holter monitor from March 28, 2024 which is notable for atrial fibrillation throughout.  Ventricular rates are generally well-controlled.  There are rare PVCs without significant ventricular ectopy.    Stress:   No results found for: \"SUMMEDDIFFER\", \"TID\", \"NUCEF\"       CHADS2 Score: 5 (4/29/2024  2:53 PM)  Risk of ischemic stroke %: 7.2 % (4/29/2024  2:53 PM)  Risk of stroke/TIA/systemic embolism %: 10 % (4/29/2024  2:53 PM)       Assessment and Plan:    Brando is a very pleasant 64 y.o. male with:      Diagnoses and all orders for this visit:    Persistent atrial fibrillation (CMS/HCC)  -     ECG 12 lead -Normal, Today    Chronic systolic heart failure (CMS/HCC)  -     Ambulatory referral to Cardiac Electrophysiology    We discussed the " options for managing the arrhythmia.  Given the longstanding persistent nature of the atrial fibrillation the likelihood of long-term sinus rhythm even with catheter ablation is probably in the neighborhood of about 50%.  We did discuss the importance of ongoing rate control anticoagulation irrespective of any effort to restore sinus.    I have recommended to him that we proceed with the stress test as ordered.  If there is clear evidence of significant depression in the left ventricular systolic function then 1 could make a reasonable argument for a more aggressive approach based on recent data showing that maintenance of sinus rhythm in the heart failure population can improve mortality.  If on the other hand the stress test shows a normal ejection fraction and might be more reasonable to continue to pursue a rate control strategy with anticoagulation.    Thank you for allowing our participation in the care of this very pleasant patient.  Please do not hesitate to contact us at [phone] with any questions or concerns that you may have.    Hernan Casillas, DO     Please note that portions of this document were generated with speech recognition software.  Reasonable efforts have been made to correct any transcriptional errors however some typographical errors may remain.

## 2024-05-02 ENCOUNTER — LAB (OUTPATIENT)
Dept: LAB | Facility: CLINIC | Age: 64
End: 2024-05-02
Payer: MEDICARE

## 2024-05-02 ENCOUNTER — OFFICE VISIT (OUTPATIENT)
Dept: DERMATOLOGY | Facility: CLINIC | Age: 64
End: 2024-05-02
Payer: MEDICARE

## 2024-05-02 ENCOUNTER — ANTICOAGULATION VISIT (OUTPATIENT)
Dept: PHARMACY | Facility: HOSPITAL | Age: 64
End: 2024-05-02
Payer: MEDICARE

## 2024-05-02 VITALS — OXYGEN SATURATION: 97 % | HEART RATE: 69 BPM | WEIGHT: 311.95 LBS | BODY MASS INDEX: 37.99 KG/M2 | HEIGHT: 76 IN

## 2024-05-02 DIAGNOSIS — I48.19 PERSISTENT ATRIAL FIBRILLATION (CMS/HCC): Primary | Chronic | ICD-10-CM

## 2024-05-02 DIAGNOSIS — L21.9 SEBORRHEIC DERMATITIS: ICD-10-CM

## 2024-05-02 DIAGNOSIS — I48.19 PERSISTENT ATRIAL FIBRILLATION (CMS/HCC): Chronic | ICD-10-CM

## 2024-05-02 DIAGNOSIS — I48.91 ATRIAL FIBRILLATION, UNSPECIFIED TYPE (CMS/HCC): ICD-10-CM

## 2024-05-02 DIAGNOSIS — L30.9 DERMATITIS: Primary | ICD-10-CM

## 2024-05-02 LAB — INR PPP: 2.1 (ref 0.9–1.1)

## 2024-05-02 PROCEDURE — 85610 PROTHROMBIN TIME: CPT

## 2024-05-02 PROCEDURE — G0463 HOSPITAL OUTPT CLINIC VISIT: HCPCS | Mod: PO

## 2024-05-02 PROCEDURE — 99212 OFFICE O/P EST SF 10 MIN: CPT

## 2024-05-02 PROCEDURE — 36416 COLLJ CAPILLARY BLOOD SPEC: CPT

## 2024-05-02 NOTE — PROGRESS NOTES
Subjective   Brando Sexton is contacted by the anticoagulation clinic for monitoring of his long term warfarin therapy.     INR: 2.1  Continue current Warfarin dosing  Follow-up INR: 1 month.    Current Outpatient Medications   Medication Sig Dispense Refill    losartan (Cozaar) 25 mg tablet Take 1 tablet (25 mg total) by mouth daily 90 tablet 3    carvediloL (COREG) 6.25 mg tablet Take 1 tablet (6.25 mg total) by mouth 2 (two) times a day Unknown mg 180 tablet 3    vitamin E,dl-alpha tocopherol, (VITAMIN E, BULK, MISC) 1 tablet daily      spironolactone (ALDACTONE) 25 mg tablet Take 0.5 tablets (12.5 mg total) by mouth daily 15 tablet 11    tacrolimus (PROTOPIC) 0.1 % ointment Apply thin layers over rash twice daily as needed. Safe to use on face. Safe to use daily. If this burns, mix with vaseline when you apply. 30 g 3    triamcinolone (KENALOG) 0.1 % cream Apply thin layer over affected areas twice daily for two weeks then twice weekly for maintenance. OK to increase use again for flares. Avoid face, armpits, and groin. 454 g 3    triamcinolone (KENALOG) 0.1 % ointment Apply thin layer over affected areas twice daily for two weeks then twice weekly for maintenance. OK to increase use again for flares. Avoid face, armpits and groin. 454 g 3    febuxostat (ULORIC) 80 mg tablet Take 1 tablet (80 mg total) by mouth daily 90 tablet 3    furosemide (LASIX) 20 mg tablet Take 1 tablet (20 mg total) by mouth daily 90 tablet 3    warfarin (COUMADIN) 4 mg tablet Alternate 2mg with 4mg tablet every other day 45 tablet 3    warfarin (COUMADIN) 2 mg tablet Alternate 2mg with 4mg tablet every other day 45 tablet 3    ascorbic acid (VITAMIN C ORAL) Take by mouth       No current facility-administered medications for this visit.         Objective     There were no vitals taken for this visit.    Assessment/Plan      Anticoagulation Summary  As of 2024      INR goal:  2.0-3.0   INR used for dosin.1 (2024)    Full warfarin instructions:  2 mg every Sun, Tue, Thu; 4 mg all other days   No change documented:  Benoit Martinez PharmD   Next INR check:  5/30/2024   Priority:  Maintenance    Indications    Persistent atrial fibrillation (CMS/[facility]) [I48.19]                 Anticoagulation Care Providers       Provider Role Specialty Phone number    Avery Gordon MD Referring Family Medicine [phone]            Anticoagulation therapy status: INR is stable, no dose adjustment required.  Patient verbalized understanding regarding dose, monitoring and INR.    Please reference Anticoagulation episode for additional documentation.

## 2024-05-02 NOTE — PROGRESS NOTES
DERMATOLOGYPINKY  OFFICE VISIT    Subjective   HPI  Brando Sexton is a 64 y.o. male without personal history of skin cancer,  . presenting with a chief complaint of eczema (chest and back) recheck. Patient requests a lesion specific exam. Patient reports he is doing well with the protopic and only applies it 2 times a week. He has had 1 flare but he did not apply the protopic, once he did it cleared up.    Patient was last seen in office on 02/23/24 by Dr. Esteves. Patient has a history of seborrheic dermatitis (hydrocortisone and tacrolimus (PROTOPIC) 0.1 % ointment) and dermatitis (tacrolimus (PROTOPIC) 0.1 % ointment).    Otherwise denies any lesions with associated rapid growth, pigment change, or spontaneous bleeding. Denies any other concerns or acute changes to baseline health.    Past Medical History:   Diagnosis Date    Atrial fibrillation (CMS/[facility])     COVID-19 01/14/2022    Gout     Headache 10/17/2023    Injury of knee 10/17/2023    Pain in joint, shoulder region 01/25/2012    Note: Unchanged    Psoriasis     Sprain of rotator cuff capsule 07/12/2011    Note: Unchanged     Past Surgical History:   Procedure Laterality Date    CARDIOVERSION  03/2010    COLONOSCOPY  01/01/1995    ORTHOPEDIC SURGERY Bilateral     Knee Scope    OTHER SURGICAL HISTORY Right     Orthopedic:Rotator cuff x2 Right shoulder     family history includes Asthma in his father; Colon cancer in his mother; Diabetes type II in his father's sister; Heart disease in his father, father's brother, and mother; Other in his father; Psoriasis in his father.    Review of Systems  A 12 point review of systems was performed and negative aside from discussed in HPI.    Medications  Current Outpatient Medications on File Prior to Visit   Medication Sig Dispense Refill    losartan (Cozaar) 25 mg tablet Take 1 tablet (25 mg total) by mouth daily 90 tablet 3    carvediloL (COREG) 6.25 mg tablet Take 1 tablet (6.25 mg total) by mouth 2  "(two) times a day Unknown mg 180 tablet 3    vitamin E,dl-alpha tocopherol, (VITAMIN E, BULK, MISC) 1 tablet daily      spironolactone (ALDACTONE) 25 mg tablet Take 0.5 tablets (12.5 mg total) by mouth daily 15 tablet 11    tacrolimus (PROTOPIC) 0.1 % ointment Apply thin layers over rash twice daily as needed. Safe to use on face. Safe to use daily. If this burns, mix with vaseline when you apply. 30 g 3    triamcinolone (KENALOG) 0.1 % cream Apply thin layer over affected areas twice daily for two weeks then twice weekly for maintenance. OK to increase use again for flares. Avoid face, armpits, and groin. 454 g 3    triamcinolone (KENALOG) 0.1 % ointment Apply thin layer over affected areas twice daily for two weeks then twice weekly for maintenance. OK to increase use again for flares. Avoid face, armpits and groin. 454 g 3    febuxostat (ULORIC) 80 mg tablet Take 1 tablet (80 mg total) by mouth daily 90 tablet 3    furosemide (LASIX) 20 mg tablet Take 1 tablet (20 mg total) by mouth daily 90 tablet 3    warfarin (COUMADIN) 4 mg tablet Alternate 2mg with 4mg tablet every other day 45 tablet 3    warfarin (COUMADIN) 2 mg tablet Alternate 2mg with 4mg tablet every other day 45 tablet 3    ascorbic acid (VITAMIN C ORAL) Take by mouth       No current facility-administered medications on file prior to visit.       Allergies  Allopurinol, Cephalexin, and Nizoral [ketoconazole]    Objective   Physical Examination  Vital Signs Pulse 69   Ht 1.93 m (6' 3.98\")   Wt (!) 141.5 kg (311 lb 15.2 oz)   SpO2 97%   BMI 37.99 kg/m²     General: Awake, alert and oriented x 3; no apparent distress  Skin: A localized examination of the back, arms, chest, hands, head, neck was performed and notable for no residual erythema or scale noted to trunk, arms, or chest; some mild erythema and scale noted to bearded area of face.      Procedure(s)    Procedures        Assessment and Plan  1. Dermatitis  Eczematous rash on trunk and " extremities resolved, and well controlled with triamcinolone as needed for flares. Will continue with gentle skin care regimen.     2. Seborrheic dermatitis  Despite conversation and advice from Dr. Esteves, patient still continues to use hydrocortisone on his face for his seborrheic dermatitis.  Patient states he has done this for 15 to 20 years and does not plan to stop using it as it seems to work for him.  Again reiterated to patient that this can cause steroid atrophy over time, however patient is okay with the risk and has Protopic in reserve. Recheck annually or sooner as needed.        Discussed importance of daily sunscreen use with a broad spectrum SPF of 30-50, broad spectrum and those including zinc oxide and titanium dioxide as well as being water resistant.  Recommended wide brimmed hats.  Finally, we discussed danger signs of changing skin lesions including sores not healing and moles changing in size, shape or color.  Should any of these lesions occur before next appointment, I instructed patient to call sooner for evaluation.    Follow-up as directed above or as needed for any new or changing lesions or skin changes of concern.    Ramon Khan, CNP

## 2024-05-29 ENCOUNTER — ANCILLARY PROCEDURE (OUTPATIENT)
Dept: SLEEP MEDICINE | Facility: HOSPITAL | Age: 64
End: 2024-05-29
Payer: MEDICARE

## 2024-05-29 VITALS — HEIGHT: 76 IN | BODY MASS INDEX: 37.87 KG/M2 | WEIGHT: 311 LBS

## 2024-05-29 DIAGNOSIS — Z91.89 AT RISK FOR OBSTRUCTIVE SLEEP APNEA: ICD-10-CM

## 2024-05-29 DIAGNOSIS — G47.30 SLEEP APNEA, UNSPECIFIED TYPE: ICD-10-CM

## 2024-05-29 PROCEDURE — 95806 SLEEP STUDY UNATT&RESP EFFT: CPT | Mod: 26 | Performed by: PSYCHIATRY & NEUROLOGY

## 2024-05-29 PROCEDURE — 95806 SLEEP STUDY UNATT&RESP EFFT: CPT

## 2024-05-29 NOTE — PATIENT INSTRUCTIONS
Patient is here to  HSAT equipment. Pt. watched Sleep Disorders video. With assistance by DENNISE Guillermo, patient was instructed on how to apply HSAT equipment by self-application. Application instructions, patient morning questionnaire, and activity log sent with pt. Pt. instructed to call the Sleep Center with any concerns during the study. Patient will do the study on 5/29/24. Will return equipment 5/30/24 via [employer].  DENNISE Guillermo 05/29/2024 1409

## 2024-06-06 ENCOUNTER — TELEPHONE (OUTPATIENT)
Dept: SLEEP MEDICINE | Facility: HOSPITAL | Age: 64
End: 2024-06-06
Payer: MEDICARE

## 2024-06-13 ENCOUNTER — TELEPHONE (OUTPATIENT)
Dept: CARDIOLOGY | Facility: CLINIC | Age: 64
End: 2024-06-13
Payer: MEDICARE

## 2024-06-13 NOTE — TELEPHONE ENCOUNTER
Pt called to inquire about Dr Walsh' thoughts on pt having Inspire sleep apnea implant. Informed pt that Dr Walsh is currently out of office until mid week next week. Pt verbalizes understanding and requests for Dr Walsh input on the procedure when he gets back.

## 2024-07-01 ENCOUNTER — TELEPHONE (OUTPATIENT)
Dept: FAMILY MEDICINE | Facility: CLINIC | Age: 64
End: 2024-07-01

## 2024-07-01 NOTE — TELEPHONE ENCOUNTER
Room 302 Francisca [email or web address]  [phone]  Andrew CONNER [facility]  [address] [place]  [address]

## 2024-07-01 NOTE — LETTER
[facility]  [address] [facility] [address]  [phone]  Dept: [phone]  July 2, 2024         Patient: Brando Sexton  YOB: 1960  Today's Date: 07/02/24        of Courts Attn: Francisca CONNER [address]  [address]         To Whom it May Concern:         Brando Mccollum is a 64-year-old male who is on 2 diuretics which causes frequent urination.  He is not appropriate for jury duty due to his current medical condition.           If you have any questions or concerns, please don't hesitate to call.         Thank you for your consideration,                ROXIE GUILLERMO MD

## 2024-07-01 NOTE — TELEPHONE ENCOUNTER
"\"To whom it concerns,    Brando Mccollum is a 64-year-old male who is on 2 diuretics which causes frequent urination.  He is not appropriate for jury duty due to his current medical condition.    Thank you for your consideration,    Avery Gordon MD\""

## 2024-07-02 ENCOUNTER — TELEPHONE (OUTPATIENT)
Dept: CARDIOLOGY | Facility: CLINIC | Age: 64
End: 2024-07-02
Payer: MEDICARE

## 2024-07-02 ENCOUNTER — ANTICOAGULATION VISIT (OUTPATIENT)
Dept: ANTICOAGULATION | Facility: CLINIC | Age: 64
End: 2024-07-02
Payer: MEDICARE

## 2024-07-02 VITALS — SYSTOLIC BLOOD PRESSURE: 127 MMHG | BODY MASS INDEX: 38.73 KG/M2 | WEIGHT: 315 LBS | DIASTOLIC BLOOD PRESSURE: 67 MMHG

## 2024-07-02 DIAGNOSIS — I48.19 PERSISTENT ATRIAL FIBRILLATION (CMS/HCC): Primary | Chronic | ICD-10-CM

## 2024-07-02 LAB — INR (AMB): 2.5 (ref 2–3)

## 2024-07-02 PROCEDURE — G0463 HOSPITAL OUTPT CLINIC VISIT: HCPCS

## 2024-07-02 PROCEDURE — 85610 PROTHROMBIN TIME: CPT | Mod: QW

## 2024-07-02 NOTE — TELEPHONE ENCOUNTER
Pt called to inform us that he is reporting to Jury Duty on 7-9-2024. Pt is concerned is his is able to do it due to the diuretics causing him to frequently use the restroom. He is requesting for Dr Walsh to write a letter excusing him from the Jury Duty. Informed pt that Dr Walsh will be notified and will respond with recommendations. Pt verbalizes understanding.

## 2024-07-02 NOTE — PROGRESS NOTES
Subjective   Brando Sexton presents to the anticoagulation clinic for monitoring of his long term warfarin therapy. Jc confirmed warfarin 2 mg three days of the week and 4 mg on the other four days of the week.     Today Brando reports the following:   Bleeding signs/symptoms: No   Major bleeding event: No  Thrombosis signs/symptoms: No  Thromboembolic event: No  Missed doses: No  Medication changes: No  Dietary changes: No  Bacterial/viral infection: no  Other concerns: No    Current Outpatient Medications   Medication Sig Dispense Refill    losartan (Cozaar) 25 mg tablet Take 1 tablet (25 mg total) by mouth daily 90 tablet 3    carvediloL (COREG) 6.25 mg tablet Take 1 tablet (6.25 mg total) by mouth 2 (two) times a day Unknown mg 180 tablet 3    vitamin E,dl-alpha tocopherol, (VITAMIN E, BULK, MISC) 1 tablet daily      spironolactone (ALDACTONE) 25 mg tablet Take 0.5 tablets (12.5 mg total) by mouth daily 15 tablet 11    tacrolimus (PROTOPIC) 0.1 % ointment Apply thin layers over rash twice daily as needed. Safe to use on face. Safe to use daily. If this burns, mix with vaseline when you apply. 30 g 3    triamcinolone (KENALOG) 0.1 % cream Apply thin layer over affected areas twice daily for two weeks then twice weekly for maintenance. OK to increase use again for flares. Avoid face, armpits, and groin. 454 g 3    triamcinolone (KENALOG) 0.1 % ointment Apply thin layer over affected areas twice daily for two weeks then twice weekly for maintenance. OK to increase use again for flares. Avoid face, armpits and groin. 454 g 3    febuxostat (ULORIC) 80 mg tablet Take 1 tablet (80 mg total) by mouth daily 90 tablet 3    furosemide (LASIX) 20 mg tablet Take 1 tablet (20 mg total) by mouth daily 90 tablet 3    warfarin (COUMADIN) 4 mg tablet Alternate 2mg with 4mg tablet every other day 45 tablet 3    warfarin (COUMADIN) 2 mg tablet Alternate 2mg with 4mg tablet every other day 45 tablet 3    ascorbic acid  (VITAMIN C ORAL) Take by mouth       No current facility-administered medications for this visit.         Objective     /67 (BP Location: Left arm, Patient Position: Sitting, Cuff Size: Large Adult)   Wt (!) 144.3 kg (318 lb 3.2 oz)   BMI 38.73 kg/m²     Assessment/Plan      Anticoagulation Summary  As of 2024      INR goal:  2.0-3.0   INR used for dosin.5 (2024)   Full warfarin instructions:  2 mg every Sun, Tue, Thu; 4 mg all other days   No change documented:  Yair Mcdaniels, PharmD   Next INR check:  2024   Priority:  Maintenance    Indications    Persistent atrial fibrillation (CMS/HCC) [I48.19]                 Anticoagulation Care Providers       Provider Role Specialty Phone number    Avery Gordon MD Referring Family Medicine [phone]            Anticoagulation therapy status: INR is stable, no dose adjustment required. Continue warfarin 2 mg Tues/Thurs/Sun and 4 mg all other days of the week. Follow up in 1 month.  Patient verbalized understanding regarding dose, monitoring and INR.      Please reference Anticoagulation episode for additional documentation.

## 2024-08-01 ENCOUNTER — ANCILLARY PROCEDURE (OUTPATIENT)
Dept: CARDIOLOGY | Facility: CLINIC | Age: 64
End: 2024-08-01
Payer: MEDICARE

## 2024-08-01 VITALS
HEIGHT: 76 IN | DIASTOLIC BLOOD PRESSURE: 68 MMHG | BODY MASS INDEX: 38.36 KG/M2 | HEART RATE: 81 BPM | SYSTOLIC BLOOD PRESSURE: 116 MMHG | OXYGEN SATURATION: 95 % | WEIGHT: 315 LBS

## 2024-08-01 VITALS
OXYGEN SATURATION: 94 % | SYSTOLIC BLOOD PRESSURE: 133 MMHG | BODY MASS INDEX: 38.36 KG/M2 | DIASTOLIC BLOOD PRESSURE: 84 MMHG | HEART RATE: 78 BPM | WEIGHT: 315 LBS | HEIGHT: 76 IN

## 2024-08-01 DIAGNOSIS — I50.22 CHRONIC SYSTOLIC HEART FAILURE (CMS/HCC): ICD-10-CM

## 2024-08-01 LAB
BSA FOR ECHO PROCEDURE: 2.78 M2
NUC STRESS EJECTION FRACTION: 36 %
NUC STRESS TID: 1.05
RH CV NM REST DOSE: 11.7
RH CV NM STRESS DOSE: 33.6
STRESS BASELINE BP: NORMAL MMHG
STRESS BASELINE HR: 78 BPM
STRESS O2 SAT REST: 95 %
STRESS PERCENT HR: 63 %
STRESS POST O2 SAT PEAK: 98 %
STRESS POST PEAK BP: NORMAL MMHG
STRESS POST PEAK HR: 97 BPM
STRESS TARGET HR: 132 BPM

## 2024-08-01 PROCEDURE — 78452 HT MUSCLE IMAGE SPECT MULT: CPT

## 2024-08-01 PROCEDURE — A9502 TC99M TETROFOSMIN: HCPCS | Performed by: INTERNAL MEDICINE

## 2024-08-01 PROCEDURE — 93016 CV STRESS TEST SUPVJ ONLY: CPT | Performed by: INTERNAL MEDICINE

## 2024-08-01 PROCEDURE — 3430000500 HC RX DIAGNOSTIC RADIOPHARMACEUTICALS: Performed by: INTERNAL MEDICINE

## 2024-08-01 PROCEDURE — 6360000200 HC RX 636 W HCPCS (ALT 250 FOR IP): Mod: JZ | Performed by: INTERNAL MEDICINE

## 2024-08-01 PROCEDURE — 93018 CV STRESS TEST I&R ONLY: CPT | Performed by: INTERNAL MEDICINE

## 2024-08-01 PROCEDURE — 78452 HT MUSCLE IMAGE SPECT MULT: CPT | Mod: 26 | Performed by: INTERNAL MEDICINE

## 2024-08-01 RX ORDER — REGADENOSON 0.08 MG/ML
0.4 INJECTION, SOLUTION INTRAVENOUS ONCE
Status: COMPLETED | OUTPATIENT
Start: 2024-08-01 | End: 2024-08-01

## 2024-08-01 RX ADMIN — REGADENOSON 0.4 MG: 0.08 INJECTION, SOLUTION INTRAVENOUS at 10:30

## 2024-08-01 RX ADMIN — TETROFOSMIN 33.57 MILLICURIE: 1.38 INJECTION, POWDER, LYOPHILIZED, FOR SOLUTION INTRAVENOUS at 10:30

## 2024-08-01 RX ADMIN — TETROFOSMIN 11.66 MILLICURIE: 1.38 INJECTION, POWDER, LYOPHILIZED, FOR SOLUTION INTRAVENOUS at 08:55

## 2024-08-12 NOTE — PROGRESS NOTES
CC:  Chief Complaint   Patient presents with    Gout       HPI:  Mr. Sexton is a very pleasant 64-year-old male with chronic gout that is in remission on febuxostat 80 mg by mouth daily      Interim:  History of Present Illness    He denies any recent gout flares. 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 The patient's ejection fraction had increased from 36 to 47, indicating improved heart function. However, he underwent a stress test as a precautionary measure to rule out any blockages, despite not experiencing any symptoms such as sweating, palpitations, or chest pain.    In terms of medication, the patient is on a regimen that includes febuxostat 80 mg daily for gout. No changes in medication or dosage were discussed during the consultation.    Overall, the patient appears to be managing his chronic conditions well, with no new health complaints or concerns raised during the consultation.            ROS:  See Interim for details    History:  Past Medical History:   Diagnosis Date    Atrial fibrillation (CMS/[facility])     COVID-19 01/14/2022    Gout     Headache 10/17/2023    Injury of knee 10/17/2023    Pain in joint, shoulder region 01/25/2012    Note: Unchanged    Psoriasis     Sprain of rotator cuff capsule 07/12/2011    Note: Unchanged     Past Surgical History:   Procedure Laterality Date    CARDIOVERSION  03/2010    COLONOSCOPY  01/01/1995    ORTHOPEDIC SURGERY Bilateral     Knee Scope    OTHER SURGICAL HISTORY Right     Orthopedic:Rotator cuff x2 Right shoulder     Social History     Socioeconomic History    Marital status:      Spouse name: Not on file    Number of children: Not on file    Years of education: Not on file    Highest education level: Not on file   Occupational History    Not on file   Tobacco Use    Smoking status: Former    Smokeless tobacco: Former   Vaping Use    Vaping status: Never Used   Substance and Sexual  Activity    Alcohol use: No    Drug use: Never    Sexual activity: Defer   Other Topics Concern    Not on file   Social History Narrative    Not on file     Social Determinants of Health     Financial Resource Strain: Patient Declined (12/19/2023)    Overall Financial Resource Strain (CARDIA)     Difficulty of Paying Living Expenses: Patient declined   Food Insecurity: Patient Declined (12/19/2023)    Hunger Vital Sign     Worried About Running Out of Food in the Last Year: Patient declined     Ran Out of Food in the Last Year: Patient declined   Transportation Needs: No Transportation Needs (12/19/2023)    PRAPARE - Transportation     Lack of Transportation (Medical): No     Lack of Transportation (Non-Medical): No   Physical Activity: Insufficiently Active (12/19/2023)    Exercise Vital Sign     Days of Exercise per Week: 1 day     Minutes of Exercise per Session: 60 min   Stress: No Stress Concern Present (12/19/2023)    [place] [employer] - Occupational Stress Questionnaire     Feeling of Stress : Not at all   Social Connections: Unknown (12/19/2023)    Social Connection and Isolation Panel [NHANES]     Frequency of Communication with Friends and Family: Patient declined     Frequency of Social Gatherings with Friends and Family: Patient declined     Attends [employer] Services: Patient declined     Active Member of Clubs or Organizations: Patient declined     Attends Club or Organization Meetings: Never     Marital Status:    Intimate Partner Violence: Unknown (12/19/2023)    Humiliation, Afraid, Rape, and Kick questionnaire     Fear of Current or Ex-Partner: Patient declined     Emotionally Abused: Patient declined     Physically Abused: Not on file     Sexually Abused: No   Housing Stability: Unknown (12/19/2023)    Housing Stability Vital Sign     Unable to Pay for Housing in the Last Year: Patient refused     Number of Places Lived in the Last Year: Not on file     Unstable  "Housing in the Last Year: Patient refused       Objective:  Vitals:    08/14/24 0952 08/14/24 1007   BP: 130/59    Pulse: 55    SpO2:  95%   Weight: (!) 144.2 kg (318 lb)    Height: 1.93 m (6' 4\")        Allergies:  Allopurinol, Cephalexin, and Nizoral [ketoconazole]    Medications:  Current Outpatient Medications on File Prior to Visit   Medication Sig Dispense Refill    losartan (Cozaar) 25 mg tablet Take 1 tablet (25 mg total) by mouth daily 90 tablet 3    carvediloL (COREG) 6.25 mg tablet Take 1 tablet (6.25 mg total) by mouth 2 (two) times a day Unknown mg 180 tablet 3    vitamin E,dl-alpha tocopherol, (VITAMIN E, BULK, MISC) 1 tablet daily      spironolactone (ALDACTONE) 25 mg tablet Take 0.5 tablets (12.5 mg total) by mouth daily 15 tablet 11    tacrolimus (PROTOPIC) 0.1 % ointment Apply thin layers over rash twice daily as needed. Safe to use on face. Safe to use daily. If this burns, mix with vaseline when you apply. 30 g 3    triamcinolone (KENALOG) 0.1 % cream Apply thin layer over affected areas twice daily for two weeks then twice weekly for maintenance. OK to increase use again for flares. Avoid face, armpits, and groin. 454 g 3    triamcinolone (KENALOG) 0.1 % ointment Apply thin layer over affected areas twice daily for two weeks then twice weekly for maintenance. OK to increase use again for flares. Avoid face, armpits and groin. 454 g 3    furosemide (LASIX) 20 mg tablet Take 1 tablet (20 mg total) by mouth daily 90 tablet 3    warfarin (COUMADIN) 4 mg tablet Alternate 2mg with 4mg tablet every other day 45 tablet 3    warfarin (COUMADIN) 2 mg tablet Alternate 2mg with 4mg tablet every other day 45 tablet 3    ascorbic acid (VITAMIN C ORAL) Take by mouth       No current facility-administered medications on file prior to visit.         Labs:  CBC:  Lab Results   Component Value Date    WBC 6.7 03/19/2024    HGB 15.3 03/19/2024    HCT 45.3 03/19/2024    MCV 96.3 03/19/2024    MCH 32.5 03/19/2024    " MCHC 33.7 03/19/2024    RDW 13.7 03/19/2024     03/19/2024    MPV 8.4 03/19/2024      CMP:  Lab Results   Component Value Date     03/19/2024    K 4.5 03/19/2024     (H) 03/19/2024    CO2 24 03/19/2024    ANIONGAP 7 03/19/2024    BUN 33 (H) 03/19/2024    CREATININE 1.56 (H) 03/19/2024    GLUCOSE 97 03/19/2024    AST 21 03/19/2024    ALT 16 03/19/2024    ALKPHOS 96 03/19/2024    PROT 6.6 03/19/2024    ALBUMIN 4.0 03/19/2024    BILITOT 0.83 03/19/2024    EGFR 49 (L) 03/19/2024    MG 2.1 03/19/2024    URICACID 4.7 10/22/2023    CORRCALCIUM 8.8 03/19/2024      CRP:  Lab Results   Component Value Date    CRP 3.9 11/26/2023     ESR:  Lab Results   Component Value Date    SEDRATE 1 08/15/2023           Assessment/Plan:  Jc was seen today for gout.    Diagnoses and all orders for this visit:    Gout of multiple sites, unspecified cause, unspecified chronicity  -     Comprehensive metabolic panel Blood, Venous; Future  -     C-reactive protein (Inflammation) Blood, Venous; Future  -     Uric acid Blood, Venous; Future  -     febuxostat (ULORIC) 80 mg tablet; Take 1 tablet (80 mg total) by mouth daily    Medication management      Assessment/Plan     Gout  He is stable on Febuxostat 80mg daily. We will continue Febuxostat 80mg daily, order CRP, liver and kidney function tests, and uric acid level. Follow-up appointment in 1 year.              Discussed all in detail patient voiced understanding and is in agreement with plan.    On this date of service 30 minutes of total time was spent in evaluation and management of this encounter.      Doris Yates MD      10:57 AM, 8/14/2024

## 2024-08-14 ENCOUNTER — OFFICE VISIT (OUTPATIENT)
Dept: RHEUMATOLOGY | Facility: CLINIC | Age: 64
End: 2024-08-14
Payer: MEDICARE

## 2024-08-14 VITALS
DIASTOLIC BLOOD PRESSURE: 59 MMHG | SYSTOLIC BLOOD PRESSURE: 130 MMHG | WEIGHT: 315 LBS | BODY MASS INDEX: 38.36 KG/M2 | HEART RATE: 55 BPM | OXYGEN SATURATION: 95 % | HEIGHT: 76 IN

## 2024-08-14 DIAGNOSIS — Z79.899 MEDICATION MANAGEMENT: ICD-10-CM

## 2024-08-14 DIAGNOSIS — M10.9 GOUT OF MULTIPLE SITES, UNSPECIFIED CAUSE, UNSPECIFIED CHRONICITY: Primary | ICD-10-CM

## 2024-08-14 PROCEDURE — 99214 OFFICE O/P EST MOD 30 MIN: CPT | Performed by: INTERNAL MEDICINE

## 2024-08-14 PROCEDURE — G0463 HOSPITAL OUTPT CLINIC VISIT: HCPCS | Mod: PO | Performed by: INTERNAL MEDICINE

## 2024-08-14 RX ORDER — FEBUXOSTAT 80 MG/1
80 TABLET, FILM COATED ORAL DAILY
Qty: 90 TABLET | Refills: 3 | Status: SHIPPED | OUTPATIENT
Start: 2024-08-14 | End: 2025-08-14

## 2024-08-14 NOTE — LETTER
[facility] RHEUMATOLOGY  [address] [facility] [place] [address]  [phone]  Dept: [phone]  08/14/24      No referring provider defined for this encounter.        To: No ref. provider found      Thank you for referring your patient, Brando Sexton, to receive care in my office. I have enclosed a copy of the note for Brando from 08/14/24.    Please contact me with any questions you may have regarding the visit.    Sincerely,         Doris Yates MD  [address] [facility] [address] [facility] [place] [address]  [phone]    CC: Avery Gordon MD

## 2024-08-16 ENCOUNTER — LAB (OUTPATIENT)
Dept: LAB | Facility: CLINIC | Age: 64
End: 2024-08-16
Payer: MEDICARE

## 2024-08-16 ENCOUNTER — ANTICOAGULATION VISIT (OUTPATIENT)
Dept: PHARMACY | Facility: HOSPITAL | Age: 64
End: 2024-08-16
Payer: MEDICARE

## 2024-08-16 DIAGNOSIS — R73.9 HYPERGLYCEMIA: ICD-10-CM

## 2024-08-16 DIAGNOSIS — I48.19 PERSISTENT ATRIAL FIBRILLATION (CMS/HCC): Chronic | ICD-10-CM

## 2024-08-16 DIAGNOSIS — M10.9 GOUT OF MULTIPLE SITES, UNSPECIFIED CAUSE, UNSPECIFIED CHRONICITY: ICD-10-CM

## 2024-08-16 DIAGNOSIS — I50.22 CHRONIC SYSTOLIC HEART FAILURE (CMS/HCC): ICD-10-CM

## 2024-08-16 DIAGNOSIS — I48.91 ATRIAL FIBRILLATION, UNSPECIFIED TYPE (CMS/HCC): ICD-10-CM

## 2024-08-16 DIAGNOSIS — I48.19 PERSISTENT ATRIAL FIBRILLATION (CMS/HCC): Primary | Chronic | ICD-10-CM

## 2024-08-16 LAB
ALBUMIN SERPL-MCNC: 3.8 G/DL (ref 3.5–5.3)
ALP SERPL-CCNC: 88 U/L (ref 45–115)
ALT SERPL-CCNC: 15 U/L (ref 7–52)
ANION GAP SERPL CALC-SCNC: 6 MMOL/L (ref 3–11)
AST SERPL-CCNC: 17 U/L
BILIRUB SERPL-MCNC: 0.76 MG/DL (ref 0.2–1.4)
BUN SERPL-MCNC: 31 MG/DL (ref 7–25)
CALCIUM ALBUM COR SERPL-MCNC: 9.1 MG/DL (ref 8.6–10.3)
CALCIUM SERPL-MCNC: 8.9 MG/DL (ref 8.6–10.3)
CHLORIDE SERPL-SCNC: 109 MMOL/L (ref 98–107)
CO2 SERPL-SCNC: 25 MMOL/L (ref 21–32)
CREAT SERPL-MCNC: 1.49 MG/DL (ref 0.7–1.3)
CRP SERPL-MCNC: 2.4 MG/L
EGFRCR SERPLBLD CKD-EPI 2021: 52 ML/MIN/1.73M*2
EST. AVERAGE GLUCOSE BLD GHB EST-MCNC: 122.6 MG/DL
GLUCOSE SERPL-MCNC: 106 MG/DL (ref 70–105)
HBA1C MFR BLD: 5.9 % (ref 4–6)
INR BLD: 2.4
POTASSIUM SERPL-SCNC: 4.2 MMOL/L (ref 3.5–5.1)
PROT SERPL-MCNC: 6.4 G/DL (ref 6–8.3)
PROTHROMBIN TIME: 26.2 SECONDS (ref 9.4–12.5)
SODIUM SERPL-SCNC: 140 MMOL/L (ref 135–145)
URATE SERPL-MCNC: 4.5 MG/DL (ref 4.4–7.6)

## 2024-08-16 PROCEDURE — 86140 C-REACTIVE PROTEIN: CPT

## 2024-08-16 PROCEDURE — 36415 COLL VENOUS BLD VENIPUNCTURE: CPT

## 2024-08-16 PROCEDURE — 84550 ASSAY OF BLOOD/URIC ACID: CPT

## 2024-08-16 PROCEDURE — 80053 COMPREHEN METABOLIC PANEL: CPT

## 2024-08-16 PROCEDURE — 85610 PROTHROMBIN TIME: CPT

## 2024-08-16 PROCEDURE — 83036 HEMOGLOBIN GLYCOSYLATED A1C: CPT

## 2024-08-16 NOTE — PROGRESS NOTES
Subjective   Brando Sexton is contacted by the anticoagulation clinic for monitoring of his long term warfarin therapy.       Today Brando reports the following:   Bleeding signs/symptoms: No   Major bleeding event: No  Thrombosis signs/symptoms: No  Thromboembolic event: No  Missed doses: No  Medication changes: No  Dietary changes: No  Bacterial/viral infection: no  Other concerns: No    Current Outpatient Medications   Medication Sig Dispense Refill    febuxostat (ULORIC) 80 mg tablet Take 1 tablet (80 mg total) by mouth daily 90 tablet 3    losartan (Cozaar) 25 mg tablet Take 1 tablet (25 mg total) by mouth daily 90 tablet 3    carvediloL (COREG) 6.25 mg tablet Take 1 tablet (6.25 mg total) by mouth 2 (two) times a day Unknown mg 180 tablet 3    vitamin E,dl-alpha tocopherol, (VITAMIN E, BULK, MISC) 1 tablet daily      spironolactone (ALDACTONE) 25 mg tablet Take 0.5 tablets (12.5 mg total) by mouth daily 15 tablet 11    tacrolimus (PROTOPIC) 0.1 % ointment Apply thin layers over rash twice daily as needed. Safe to use on face. Safe to use daily. If this burns, mix with vaseline when you apply. 30 g 3    triamcinolone (KENALOG) 0.1 % cream Apply thin layer over affected areas twice daily for two weeks then twice weekly for maintenance. OK to increase use again for flares. Avoid face, armpits, and groin. 454 g 3    triamcinolone (KENALOG) 0.1 % ointment Apply thin layer over affected areas twice daily for two weeks then twice weekly for maintenance. OK to increase use again for flares. Avoid face, armpits and groin. 454 g 3    furosemide (LASIX) 20 mg tablet Take 1 tablet (20 mg total) by mouth daily 90 tablet 3    warfarin (COUMADIN) 4 mg tablet Alternate 2mg with 4mg tablet every other day 45 tablet 3    warfarin (COUMADIN) 2 mg tablet Alternate 2mg with 4mg tablet every other day 45 tablet 3    ascorbic acid (VITAMIN C ORAL) Take by mouth       No current facility-administered medications for this  visit.         Objective     There were no vitals taken for this visit.    Assessment/Plan      Anticoagulation Summary  As of 2024      INR goal:  2.0-3.0   INR used for dosin.4 (2024)   Full warfarin instructions:  2 mg every Sun, Tue, Thu; 4 mg all other days   No change documented:  Fredi Calles, [place]   Next INR check:  2024   Priority:  Maintenance    Indications    Persistent atrial fibrillation (CMS/HCC) [I48.19]                 Anticoagulation Care Providers       Provider Role Specialty Phone number    Avery Gordon MD Referring Family Medicine [phone]            Anticoagulation therapy status: INR is stable, no dose adjustment required.  Patient verbalized understanding regarding dose, monitoring and INR.    Additional education : INR in one month      Please reference Anticoagulation episode for additional documentation.

## 2024-08-22 ENCOUNTER — TELEPHONE (OUTPATIENT)
Dept: CARDIOLOGY | Facility: CLINIC | Age: 64
End: 2024-08-22
Payer: MEDICARE

## 2024-08-22 NOTE — TELEPHONE ENCOUNTER
Jc called today and was transferred to the general cardiology pod line.       Jc states he has concerns regarding his diuretics.  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   He states that he is on 2 diuretics--furosemide, and spironolactone.  Jc indicates that he has to urinate about every half hour after he takes them and this will interfere with his job.    Jc wants to know if he can stop the diuretics or if there is an alternative to these meds as he will not be able to stop to go to the restroom.      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   The afternoon route takes from about 1:45pm to 4 or 5pm.       Informed him that I would send message/notify providers in the CHF clinic to review and make recommendations and they will get back to him.   He stated understanding.

## 2024-08-23 NOTE — TELEPHONE ENCOUNTER
I spoke with Jc and informed him of information as per Dr. Walsh' note.  Explained EF, and purpose of diuretics in conjunction with improving heart function.  Informed him that stopping them is not recommended by provider at this time.  Jc stated understanding of info provided.

## 2024-09-02 ENCOUNTER — OFFICE VISIT (OUTPATIENT)
Dept: URGENT CARE | Facility: CLINIC | Age: 64
End: 2024-09-02
Payer: MEDICARE

## 2024-09-02 VITALS
RESPIRATION RATE: 18 BRPM | HEART RATE: 69 BPM | OXYGEN SATURATION: 97 % | SYSTOLIC BLOOD PRESSURE: 141 MMHG | TEMPERATURE: 97.6 F | DIASTOLIC BLOOD PRESSURE: 84 MMHG

## 2024-09-02 DIAGNOSIS — J02.9 PHARYNGITIS, UNSPECIFIED ETIOLOGY: Primary | ICD-10-CM

## 2024-09-02 LAB
S PYO DNA BLD POS QL NAA+NON-PROBE: NEGATIVE
SARS-COV-2 RNA RESP QL NAA+PROBE: NEGATIVE

## 2024-09-02 PROCEDURE — 87635 SARS-COV-2 COVID-19 AMP PRB: CPT | Performed by: PHYSICIAN ASSISTANT

## 2024-09-02 PROCEDURE — 87651 STREP A DNA AMP PROBE: CPT | Performed by: PHYSICIAN ASSISTANT

## 2024-09-02 PROCEDURE — G0463 HOSPITAL OUTPT CLINIC VISIT: HCPCS | Performed by: PHYSICIAN ASSISTANT

## 2024-09-02 PROCEDURE — 99213 OFFICE O/P EST LOW 20 MIN: CPT | Performed by: PHYSICIAN ASSISTANT

## 2024-09-02 ASSESSMENT — ENCOUNTER SYMPTOMS
ARTHRALGIAS: 0
DIARRHEA: 1
HEMATURIA: 0
BACK PAIN: 0
EYE PAIN: 0
ABDOMINAL PAIN: 0
FEVER: 0
PALPITATIONS: 0
CHILLS: 0
COLOR CHANGE: 0
SEIZURES: 0
COUGH: 0
FATIGUE: 1
SHORTNESS OF BREATH: 0
VOMITING: 0
SORE THROAT: 1
DYSURIA: 0

## 2024-09-02 NOTE — PROGRESS NOTES
Subjective      Brando Sexton is a 64 y.o. male who presents for sore throat    History of Present Illness          Patient presents to the  with sore throat since last night.  Able to swallow. No fevers.  Also has some GI upset but does have a history of IBS.  No black or blood.  No fevers.  Mild fatigue.      The following have been reviewed and updated as appropriate in this visit:   Tobacco  Allergies  Meds  Problems  Med Hx  Surg Hx  Fam Hx         Allergies   Allergen Reactions    Allopurinol     Cephalexin     Nizoral [Ketoconazole] Other (see comments)     Stinging and burning     Current Outpatient Medications   Medication Sig Dispense Refill    febuxostat (ULORIC) 80 mg tablet Take 1 tablet (80 mg total) by mouth daily 90 tablet 3    losartan (Cozaar) 25 mg tablet Take 1 tablet (25 mg total) by mouth daily 90 tablet 3    carvediloL (COREG) 6.25 mg tablet Take 1 tablet (6.25 mg total) by mouth 2 (two) times a day Unknown mg 180 tablet 3    vitamin E,dl-alpha tocopherol, (VITAMIN E, BULK, MISC) 1 tablet daily      spironolactone (ALDACTONE) 25 mg tablet Take 0.5 tablets (12.5 mg total) by mouth daily 15 tablet 11    tacrolimus (PROTOPIC) 0.1 % ointment Apply thin layers over rash twice daily as needed. Safe to use on face. Safe to use daily. If this burns, mix with vaseline when you apply. 30 g 3    triamcinolone (KENALOG) 0.1 % cream Apply thin layer over affected areas twice daily for two weeks then twice weekly for maintenance. OK to increase use again for flares. Avoid face, armpits, and groin. 454 g 3    triamcinolone (KENALOG) 0.1 % ointment Apply thin layer over affected areas twice daily for two weeks then twice weekly for maintenance. OK to increase use again for flares. Avoid face, armpits and groin. 454 g 3    furosemide (LASIX) 20 mg tablet Take 1 tablet (20 mg total) by mouth daily 90 tablet 3    warfarin (COUMADIN) 4 mg tablet Alternate 2mg with 4mg tablet every other day 45  tablet 3    warfarin (COUMADIN) 2 mg tablet Alternate 2mg with 4mg tablet every other day 45 tablet 3    ascorbic acid (VITAMIN C ORAL) Take by mouth       No current facility-administered medications for this visit.     Past Medical History:   Diagnosis Date    Atrial fibrillation (CMS/HCC)     COVID-19 01/14/2022    Gout     Headache 10/17/2023    Injury of knee 10/17/2023    Pain in joint, shoulder region 01/25/2012    Note: Unchanged    Psoriasis     Sprain of rotator cuff capsule 07/12/2011    Note: Unchanged     Past Surgical History:   Procedure Laterality Date    CARDIOVERSION  03/2010    COLONOSCOPY  01/01/1995    ORTHOPEDIC SURGERY Bilateral     Knee Scope    OTHER SURGICAL HISTORY Right     Orthopedic:Rotator cuff x2 Right shoulder     Family History   Problem Relation Age of Onset    Heart disease Mother     Colon cancer Mother     Heart disease Father         MI at age 87    Other Father         Crohns    Psoriasis Father     Asthma Father     Diabetes type II Father's Sister     Heart disease Father's Brother      Social History     Socioeconomic History    Marital status:    Tobacco Use    Smoking status: Former    Smokeless tobacco: Former   Vaping Use    Vaping status: Never Used   Substance and Sexual Activity    Alcohol use: No    Drug use: Never    Sexual activity: Defer     Social Determinants of Health     Tobacco Use: Medium Risk (9/2/2024)    Patient History     Smoking Tobacco Use: Former     Smokeless Tobacco Use: Former   Alcohol Use: Not At Risk (12/19/2023)    AUDIT-C     Frequency of Alcohol Consumption: Never     Average Number of Drinks: Patient does not drink     Frequency of Binge Drinking: Patient declined   Financial Resource Strain: Patient Declined (12/19/2023)    Overall Financial Resource Strain (CARDIA)     Difficulty of Paying Living Expenses: Patient declined   Food Insecurity: Patient Declined (12/19/2023)    Hunger Vital Sign     Worried About Running Out of Food  in the Last Year: Patient declined     Ran Out of Food in the Last Year: Patient declined   Transportation Needs: No Transportation Needs (12/19/2023)    PRAPARE - Transportation     Lack of Transportation (Medical): No     Lack of Transportation (Non-Medical): No   Physical Activity: Insufficiently Active (12/19/2023)    Exercise Vital Sign     Days of Exercise per Week: 1 day     Minutes of Exercise per Session: 60 min   Stress: No Stress Concern Present (12/19/2023)    [place] [employer] - Occupational Stress Questionnaire     Feeling of Stress : Not at all   Social Connections: Unknown (12/19/2023)    Social Connection and Isolation Panel [NHANES]     Frequency of Communication with Friends and Family: Patient declined     Frequency of Social Gatherings with Friends and Family: Patient declined     Attends [employer] Services: Patient declined     Active Member of Clubs or Organizations: Patient declined     Attends Club or Organization Meetings: Never     Marital Status:    Housing Stability: Unknown (12/19/2023)    Housing Stability Vital Sign     Unable to Pay for Housing in the Last Year: Patient refused     Unstable Housing in the Last Year: Patient refused   Utilities: Patient Declined (12/19/2023)    [facility] Utilities     Threatened with loss of utilities: Patient refused       Review of Systems   Constitutional:  Positive for fatigue. Negative for chills and fever.   HENT:  Positive for sore throat. Negative for ear pain.    Eyes:  Negative for pain and visual disturbance.   Respiratory:  Negative for cough and shortness of breath.    Cardiovascular:  Negative for chest pain and palpitations.   Gastrointestinal:  Positive for diarrhea. Negative for abdominal pain and vomiting.   Genitourinary:  Negative for dysuria and hematuria.   Musculoskeletal:  Negative for arthralgias and back pain.   Skin:  Negative for color change and rash.   Neurological:  Negative for seizures and  syncope.   All other systems reviewed and are negative.      Objective   /84 (BP Location: Left arm, Patient Position: Sitting)   Pulse 69   Temp 36.4 °C (97.6 °F) (Temporal)   Resp 18   SpO2 97%     Physical Exam  Vitals and nursing note reviewed.   Constitutional:       Appearance: Normal appearance.   HENT:      Head: Normocephalic.      Right Ear: Tympanic membrane, ear canal and external ear normal.      Left Ear: Tympanic membrane, ear canal and external ear normal.      Nose: Nose normal.      Mouth/Throat:      Mouth: Mucous membranes are moist.      Pharynx: Oropharynx is clear.   Eyes:      Extraocular Movements: Extraocular movements intact.      Conjunctiva/sclera: Conjunctivae normal.      Pupils: Pupils are equal, round, and reactive to light.   Cardiovascular:      Rate and Rhythm: Normal rate and regular rhythm.      Heart sounds: Normal heart sounds.   Musculoskeletal:      Cervical back: Normal range of motion. No rigidity.   Lymphadenopathy:      Cervical: No cervical adenopathy.   Skin:     General: Skin is warm.   Neurological:      General: No focal deficit present.      Mental Status: He is alert.         Recent Results (from the past 24 hour(s))   Rapid Strep A PCR Swab    Collection Time: 09/02/24  9:14 AM   Result Value Ref Range    Streptococcus Pyogenes (GRP A) PCR Negative Negative   COVID-19 PCR    Collection Time: 09/02/24  9:14 AM    Specimen: Nasopharynx; Swab   Result Value Ref Range    COVID-19 PCR Negative Negative       Assessment/Plan   Assessment/Plan           Will check a covid  and a strep    These were negative.  Likely viral.  Conservative cares offered.     Diagnosis Plan   1. Pharyngitis, unspecified etiology  Rapid Strep A PCR Swab    COVID-19 PCR        Patient Instructions   Cause of pharyngitis or sore throat is likely viral.  You can use Cepacol lozenges or Chloraseptic throat spray to help with the pain.  He can use honey to soothe the throat.  Stay  well-hydrated.  Follow-up with your PCP as needed.  Strep testing and COVID were negative today.    TONO Gordillo

## 2024-09-02 NOTE — PATIENT INSTRUCTIONS
Cause of pharyngitis or sore throat is likely viral.  You can use Cepacol lozenges or Chloraseptic throat spray to help with the pain.  He can use honey to soothe the throat.  Stay well-hydrated.  Follow-up with your PCP as needed.  Strep testing and COVID were negative today.

## 2024-09-17 ENCOUNTER — OFFICE VISIT (OUTPATIENT)
Dept: URGENT CARE | Facility: CLINIC | Age: 64
End: 2024-09-17
Payer: MEDICARE

## 2024-09-17 VITALS
TEMPERATURE: 97 F | BODY MASS INDEX: 39.07 KG/M2 | OXYGEN SATURATION: 96 % | RESPIRATION RATE: 20 BRPM | DIASTOLIC BLOOD PRESSURE: 88 MMHG | SYSTOLIC BLOOD PRESSURE: 132 MMHG | WEIGHT: 315 LBS | HEART RATE: 77 BPM

## 2024-09-17 DIAGNOSIS — R09.81 NASAL CONGESTION: Primary | ICD-10-CM

## 2024-09-17 LAB — SARS-COV-2 RNA RESP QL NAA+PROBE: NEGATIVE

## 2024-09-17 PROCEDURE — 87635 SARS-COV-2 COVID-19 AMP PRB: CPT

## 2024-09-17 PROCEDURE — 99213 OFFICE O/P EST LOW 20 MIN: CPT

## 2024-09-17 PROCEDURE — G0463 HOSPITAL OUTPT CLINIC VISIT: HCPCS

## 2024-09-17 ASSESSMENT — ENCOUNTER SYMPTOMS
SINUS PAIN: 0
ACTIVITY CHANGE: 0
FATIGUE: 0
CHILLS: 0
RHINORRHEA: 1
APNEA: 0
HEADACHES: 0
TROUBLE SWALLOWING: 0
SINUS PRESSURE: 0
VOMITING: 0
FEVER: 0
SHORTNESS OF BREATH: 0
MYALGIAS: 0
DIARRHEA: 0
SORE THROAT: 0
WHEEZING: 0
COUGH: 0
ARTHRALGIAS: 0
NAUSEA: 0
ABDOMINAL PAIN: 0
PSYCHIATRIC NEGATIVE: 1
PALPITATIONS: 0
DIZZINESS: 0
APPETITE CHANGE: 0
WEAKNESS: 0

## 2024-09-17 NOTE — PROGRESS NOTES
Subjective      Brando Sexton is a 64 y.o. male who presents for congestion and runny nose.    History of Present Illness    Jc, a patient with a history of heart issues and on warfarin, presents with symptoms of congestion and runny nose that started the previous night. He also reports a slightly raspy voice as noticed by his wife but denies having a sore throat. He has no other respiratory symptoms such as cough, shortness of breath, or wheezing. He also denies gastrointestinal symptoms like nausea, vomiting, or diarrhea, and systemic symptoms like fever, chills, or abnormal body aches. He mentions a chronic fatigue related to his heart condition.          The following have been reviewed and updated as appropriate in this visit:   Allergies  Meds  Problems         Allergies   Allergen Reactions    Allopurinol     Cephalexin     Nizoral [Ketoconazole] Other (see comments)     Stinging and burning     Current Outpatient Medications   Medication Sig Dispense Refill    febuxostat (ULORIC) 80 mg tablet Take 1 tablet (80 mg total) by mouth daily 90 tablet 3    losartan (Cozaar) 25 mg tablet Take 1 tablet (25 mg total) by mouth daily 90 tablet 3    carvediloL (COREG) 6.25 mg tablet Take 1 tablet (6.25 mg total) by mouth 2 (two) times a day Unknown mg 180 tablet 3    vitamin E,dl-alpha tocopherol, (VITAMIN E, BULK, MISC) 1 tablet daily      spironolactone (ALDACTONE) 25 mg tablet Take 0.5 tablets (12.5 mg total) by mouth daily 15 tablet 11    tacrolimus (PROTOPIC) 0.1 % ointment Apply thin layers over rash twice daily as needed. Safe to use on face. Safe to use daily. If this burns, mix with vaseline when you apply. 30 g 3    triamcinolone (KENALOG) 0.1 % cream Apply thin layer over affected areas twice daily for two weeks then twice weekly for maintenance. OK to increase use again for flares. Avoid face, armpits, and groin. 454 g 3    triamcinolone (KENALOG) 0.1 % ointment Apply thin layer over affected  areas twice daily for two weeks then twice weekly for maintenance. OK to increase use again for flares. Avoid face, armpits and groin. 454 g 3    furosemide (LASIX) 20 mg tablet Take 1 tablet (20 mg total) by mouth daily 90 tablet 3    warfarin (COUMADIN) 4 mg tablet Alternate 2mg with 4mg tablet every other day 45 tablet 3    warfarin (COUMADIN) 2 mg tablet Alternate 2mg with 4mg tablet every other day 45 tablet 3    ascorbic acid (VITAMIN C ORAL) Take by mouth       No current facility-administered medications for this visit.     Past Medical History:   Diagnosis Date    Atrial fibrillation (CMS/HCC)     COVID-19 01/14/2022    Gout     Headache 10/17/2023    Injury of knee 10/17/2023    Pain in joint, shoulder region 01/25/2012    Note: Unchanged    Psoriasis     Sprain of rotator cuff capsule 07/12/2011    Note: Unchanged     Past Surgical History:   Procedure Laterality Date    CARDIOVERSION  03/2010    COLONOSCOPY  01/01/1995    ORTHOPEDIC SURGERY Bilateral     Knee Scope    OTHER SURGICAL HISTORY Right     Orthopedic:Rotator cuff x2 Right shoulder     Family History   Problem Relation Age of Onset    Heart disease Mother     Colon cancer Mother     Heart disease Father         MI at age 87    Other Father         Crohns    Psoriasis Father     Asthma Father     Diabetes type II Father's Sister     Heart disease Father's Brother      Social History     Socioeconomic History    Marital status:    Tobacco Use    Smoking status: Former    Smokeless tobacco: Former   Vaping Use    Vaping status: Never Used   Substance and Sexual Activity    Alcohol use: No    Drug use: Never    Sexual activity: Defer     Social Determinants of Health     Tobacco Use: Medium Risk (9/2/2024)    Patient History     Smoking Tobacco Use: Former     Smokeless Tobacco Use: Former   Alcohol Use: Not At Risk (12/19/2023)    AUDIT-C     Frequency of Alcohol Consumption: Never     Average Number of Drinks: Patient does not drink      Frequency of Binge Drinking: Patient declined   Financial Resource Strain: Patient Declined (12/19/2023)    Overall Financial Resource Strain (CARDIA)     Difficulty of Paying Living Expenses: Patient declined   Food Insecurity: Patient Declined (12/19/2023)    Hunger Vital Sign     Worried About Running Out of Food in the Last Year: Patient declined     Ran Out of Food in the Last Year: Patient declined   Transportation Needs: No Transportation Needs (12/19/2023)    PRAPARE - Transportation     Lack of Transportation (Medical): No     Lack of Transportation (Non-Medical): No   Physical Activity: Insufficiently Active (12/19/2023)    Exercise Vital Sign     Days of Exercise per Week: 1 day     Minutes of Exercise per Session: 60 min   Stress: No Stress Concern Present (12/19/2023)    [employer] [place] [employer] - Occupational Stress Questionnaire     Feeling of Stress : Not at all   Social Connections: Unknown (12/19/2023)    Social Connection and Isolation Panel [NHANES]     Frequency of Communication with Friends and Family: Patient declined     Frequency of Social Gatherings with Friends and Family: Patient declined     Attends Sabianism Services: Patient declined     Active Member of Clubs or Organizations: Patient declined     Attends Club or Organization Meetings: Never     Marital Status:    Housing Stability: Unknown (12/19/2023)    Housing Stability Vital Sign     Unable to Pay for Housing in the Last Year: Patient refused     Unstable Housing in the Last Year: Patient refused   Utilities: Patient Declined (12/19/2023)    [facility] Utilities     Threatened with loss of utilities: Patient refused       Review of Systems   Constitutional:  Negative for activity change, appetite change, chills, fatigue and fever.   HENT:  Positive for congestion, postnasal drip, rhinorrhea and sneezing. Negative for ear discharge, ear pain, sinus pressure, sinus pain, sore throat and trouble swallowing.     Respiratory:  Negative for apnea, cough, shortness of breath and wheezing.    Cardiovascular:  Negative for chest pain and palpitations.   Gastrointestinal:  Negative for abdominal pain, diarrhea, nausea and vomiting.   Genitourinary: Negative.    Musculoskeletal:  Negative for arthralgias and myalgias.   Neurological:  Negative for dizziness, weakness and headaches.   Psychiatric/Behavioral: Negative.         Objective   /88 (BP Location: Left arm, Patient Position: Sitting)   Pulse 77   Temp 36.1 °C (97 °F) (Temporal)   Resp 20   Wt (!) 145.6 kg (321 lb)   SpO2 96%   BMI 39.07 kg/m²     Physical Exam  Constitutional:       General: He is not in acute distress.     Appearance: Normal appearance. He is not ill-appearing or toxic-appearing.   HENT:      Right Ear: Tympanic membrane normal.      Left Ear: Tympanic membrane normal.      Nose: Congestion present.      Mouth/Throat:      Mouth: Mucous membranes are moist.      Pharynx: Oropharynx is clear. No oropharyngeal exudate or posterior oropharyngeal erythema.   Cardiovascular:      Rate and Rhythm: Normal rate and regular rhythm.      Pulses: Normal pulses.      Heart sounds: Normal heart sounds. No murmur heard.  Pulmonary:      Effort: Pulmonary effort is normal. No respiratory distress.      Breath sounds: Normal breath sounds. No wheezing.   Abdominal:      Palpations: Abdomen is soft.   Musculoskeletal:         General: Normal range of motion.   Skin:     General: Skin is warm and dry.      Capillary Refill: Capillary refill takes less than 2 seconds.   Neurological:      Mental Status: He is alert and oriented to person, place, and time.   Psychiatric:         Mood and Affect: Mood normal.         Behavior: Behavior normal.         Recent Results (from the past 24 hour(s))   COVID-19 PCR    Collection Time: 09/17/24  7:15 AM    Specimen: Nasopharynx; Swab   Result Value Ref Range    COVID-19 PCR Negative Negative       Assessment/Plan    Diagnoses and all orders for this visit:    Nasal congestion  -     COVID-19 PCR      Assessment/Plan     Upper Respiratory Infection  He presented with symptoms of congestion and a runny nose that began yesterday, without cough, shortness of breath, wheezing, nausea, vomiting, diarrhea, or fever. His spouse noted a mildly raspy voice, and no ear pain was reported. Throat examination appeared normal.  Advised patient since he is COVID-negative today this is likely another viral illness.  This needs to run its course.  We will advise him to self-isolate until symptoms improve and for at least 5 days from symptom onset, continue current medications, use Mucinex for congestion, and consider a sinus rinse.    COVID-19  He was tested for COVID-19 due to upper respiratory symptoms, which returned negative. We will continue to monitor symptoms and advise self-isolation until symptoms improve.        Return guidelines were discussed.  Patient verbalized understanding and was in agreement with the plan.  There are no Patient Instructions on file for this visit.    Mariah Calhoun, CNP

## 2024-09-21 ENCOUNTER — OFFICE VISIT (OUTPATIENT)
Dept: URGENT CARE | Facility: CLINIC | Age: 64
End: 2024-09-21
Payer: MEDICARE

## 2024-09-21 VITALS
WEIGHT: 300 LBS | BODY MASS INDEX: 34.71 KG/M2 | RESPIRATION RATE: 24 BRPM | OXYGEN SATURATION: 96 % | HEIGHT: 78 IN | DIASTOLIC BLOOD PRESSURE: 77 MMHG | HEART RATE: 79 BPM | SYSTOLIC BLOOD PRESSURE: 134 MMHG

## 2024-09-21 DIAGNOSIS — Z11.52A ASYMPTOMATIC COVID-19 SCREENING - TRAVEL, RETURN TO WORK, OR RETURN TO SCHOOL/SCHOOL ACTIVITIES: Primary | ICD-10-CM

## 2024-09-21 LAB — SARS-COV-2 RNA RESP QL NAA+PROBE: NEGATIVE

## 2024-09-21 PROCEDURE — 87635 SARS-COV-2 COVID-19 AMP PRB: CPT | Mod: SP

## 2024-09-21 PROCEDURE — 99999 PR OFFICE/OUTPT VISIT,PROCEDURE ONLY: CPT | Mod: NC

## 2024-09-21 NOTE — PROGRESS NOTES
Pt states he is going to see his sister and she requested he get COVID tested, he denies any symptoms.  He denies exam per provider.     Discussed cost of exam with patient as patient is not symptomatic.  Verbalizes understanding, verbal consent given for testing.

## 2024-09-30 ENCOUNTER — TELEPHONE (OUTPATIENT)
Dept: CARDIOLOGY | Facility: CLINIC | Age: 64
End: 2024-09-30
Payer: MEDICARE

## 2024-09-30 ENCOUNTER — TELEPHONE (OUTPATIENT)
Dept: FAMILY MEDICINE | Facility: CLINIC | Age: 64
End: 2024-09-30

## 2024-09-30 NOTE — TELEPHONE ENCOUNTER
Patient called and left message that he needs to have a tooth extraction. Thi is scheduled for Wednesday however he thinks this will need to be rescheduled due to his warfarin.     He is wondering how long he needs to hold his warfarin before tooth extraction.

## 2024-09-30 NOTE — TELEPHONE ENCOUNTER
Medications:    warfarin (COUMADIN) 4 mg tablet   warfarin (COUMADIN) 2 mg tablet     Patient reports he is having a tooth extraction on Wednesday and he stated he needed to stop his warfarin. He is wondering whether or not he should reschedule and how long he is supposed to be off of it.    He also stated 9/24/24 that [facility] called and they are requesting guidelines for what to do with his medication as well.

## 2024-09-30 NOTE — TELEPHONE ENCOUNTER
Per Dr Gordon pt needs to obtain this from cardiology as he has most recently seen cardiology.  He has not seen Dr Gordon since December of last year.  Both patient and dental office notified

## 2024-10-01 ENCOUNTER — TELEPHONE (OUTPATIENT)
Dept: CARDIOLOGY | Facility: CLINIC | Age: 64
End: 2024-10-01
Payer: MEDICARE

## 2024-10-01 NOTE — TELEPHONE ENCOUNTER
Patient called clinic this morning requesting recommendations per Dr. Walsh for holding Coumadin prior to dental procedure.  Informed patient recommendation is 3 to 4 days prior to dental procedure to hold his Coumadin per Dr. Walsh.  Informed patient we will call [facility] to inform them of recommendation.    Called and spoke with Kiara at [facility] regarding recommendation per Dr. Walsh of holding warfarin for 3 to 4 days prior to dental procedure.  Will have Dr. Walsh fill out and will refax to [facility] with recommendations.  Kiara verbalizes understanding.

## 2024-10-10 PROBLEM — G47.31 CENTRAL SLEEP APNEA: Status: ACTIVE | Noted: 2024-10-10

## 2024-10-10 PROBLEM — G47.33 MODERATE OBSTRUCTIVE SLEEP APNEA: Status: ACTIVE | Noted: 2024-10-10

## 2024-10-10 PROBLEM — G47.30 SEVERE SLEEP APNEA: Status: ACTIVE | Noted: 2024-10-10

## 2024-10-10 NOTE — PROGRESS NOTES
10/11/24    CC: sleep apnea        SUBJECTIVE:     HPI:    Brando Sexton is a 64 y.o. male who [facility] for complex sleep apnea.      In regard to the pt's most recent sleep study:   Sleep study date: 5/9/2024  Weight at time of study: 141.1 kg  AHI: 36.9 (52% or 19.2 centrals/HR, 1.8 mixed/HR)  Minimum O2 saturation: 80%  Recommended: Titration study    Patient is here to establish care regarding complex sleep apnea.  Sleep questionnaire has been filled out, but he does not endorse any significant sleep symptomology other than needing to wake up to use the restroom.  No snoring, no EDS, no abnormal behaviors during sleep, no RLS symptoms.    He had a PSG greater than 5 years ago, and tried a CPAP for a very short amount of time during it, never tried it at home, but started using Breathe Right strips after that which resolved his snoring.  He does endorse being a restless sleeper.  He is hesitant to try the CPAP.        ROS:  General: Denied daytime somnolence. Pt does not drive currently.  HEENT: Denied dry mouth.  Pulmonary: Denied snoring, choking/gasping during sleep.  Cardiovascular: Denied leg edema, palpitations.  Neurologic: Denied morning headaches; difficulty with attention/memory.     Allergies:   Allergies   Allergen Reactions    Allopurinol     Cephalexin     Nizoral [Ketoconazole] Other (see comments)     Stinging and burning       Histories:    PMHx:   Past Medical History:   Diagnosis Date    Atrial fibrillation (CMS/HCC)     COVID-19 01/14/2022    Gout     Headache 10/17/2023    Injury of knee 10/17/2023    Pain in joint, shoulder region 01/25/2012    Note: Unchanged    Psoriasis     Sprain of rotator cuff capsule 07/12/2011    Note: Unchanged       PSHx:   Past Surgical History:   Procedure Laterality Date    CARDIOVERSION  03/2010    COLONOSCOPY  01/01/1995    ORTHOPEDIC SURGERY Bilateral     Knee Scope    OTHER SURGICAL HISTORY Right      Orthopedic:Rotator cuff x2 Right shoulder       FamHx:   Family History   Problem Relation Age of Onset    Heart disease Mother     Colon cancer Mother     Heart disease Father         MI at age 87    Other Father         Crohns    Psoriasis Father     Asthma Father     Diabetes type II Father's Sister     Heart disease Father's Brother        SocHx:   Social History     Socioeconomic History    Marital status:      Spouse name: Not on file    Number of children: Not on file    Years of education: Not on file    Highest education level: Not on file   Occupational History    Not on file   Tobacco Use    Smoking status: Former    Smokeless tobacco: Former   Vaping Use    Vaping status: Never Used   Substance and Sexual Activity    Alcohol use: No    Drug use: Never    Sexual activity: Defer   Other Topics Concern    Not on file   Social History Narrative    Not on file     Social Determinants of Health     Financial Resource Strain: Patient Declined (12/19/2023)    Overall Financial Resource Strain (CARDIA)     Difficulty of Paying Living Expenses: Patient declined   Food Insecurity: Patient Declined (12/19/2023)    Hunger Vital Sign     Worried About Running Out of Food in the Last Year: Patient declined     Ran Out of Food in the Last Year: Patient declined   Transportation Needs: No Transportation Needs (12/19/2023)    PRAPARE - Transportation     Lack of Transportation (Medical): No     Lack of Transportation (Non-Medical): No   Physical Activity: Insufficiently Active (12/19/2023)    Exercise Vital Sign     Days of Exercise per Week: 1 day     Minutes of Exercise per Session: 60 min   Stress: No Stress Concern Present (12/19/2023)    [employer] [place] [employer] - Occupational Stress Questionnaire     Feeling of Stress : Not at all   Social Connections: Unknown (12/19/2023)    Social Connection and Isolation Panel [NHANES]     Frequency of Communication with Friends and Family: Patient declined      Frequency of Social Gatherings with Friends and Family: Patient declined     Attends Denominational Services: Patient declined     Active Member of Clubs or Organizations: Patient declined     Attends Club or Organization Meetings: Never     Marital Status:    Intimate Partner Violence: Unknown (12/19/2023)    Humiliation, Afraid, Rape, and Kick questionnaire     Fear of Current or Ex-Partner: Patient declined     Emotionally Abused: Patient declined     Physically Abused: Not on file     Sexually Abused: No   Housing Stability: Unknown (12/19/2023)    Housing Stability Vital Sign     Unable to Pay for Housing in the Last Year: Patient refused     Number of Places Lived in the Last Year: Not on file     Unstable Housing in the Last Year: Patient refused       Medications:   Current Outpatient Medications   Medication Sig Dispense Refill    febuxostat (ULORIC) 80 mg tablet Take 1 tablet (80 mg total) by mouth daily 90 tablet 3    losartan (Cozaar) 25 mg tablet Take 1 tablet (25 mg total) by mouth daily 90 tablet 3    carvediloL (COREG) 6.25 mg tablet Take 1 tablet (6.25 mg total) by mouth 2 (two) times a day Unknown mg 180 tablet 3    vitamin E,dl-alpha tocopherol, (VITAMIN E, BULK, MISC) 1 tablet daily      spironolactone (ALDACTONE) 25 mg tablet Take 0.5 tablets (12.5 mg total) by mouth daily 15 tablet 11    tacrolimus (PROTOPIC) 0.1 % ointment Apply thin layers over rash twice daily as needed. Safe to use on face. Safe to use daily. If this burns, mix with vaseline when you apply. 30 g 3    triamcinolone (KENALOG) 0.1 % cream Apply thin layer over affected areas twice daily for two weeks then twice weekly for maintenance. OK to increase use again for flares. Avoid face, armpits, and groin. 454 g 3    triamcinolone (KENALOG) 0.1 % ointment Apply thin layer over affected areas twice daily for two weeks then twice weekly for maintenance. OK to increase use again for flares. Avoid face, armpits and groin. 454 g  "3    furosemide (LASIX) 20 mg tablet Take 1 tablet (20 mg total) by mouth daily 90 tablet 3    warfarin (COUMADIN) 4 mg tablet Alternate 2mg with 4mg tablet every other day 45 tablet 3    warfarin (COUMADIN) 2 mg tablet Alternate 2mg with 4mg tablet every other day 45 tablet 3    ascorbic acid (VITAMIN C ORAL) Take by mouth       No current facility-administered medications for this visit.         OBJECTIVE:   Vitals: Pulse 87   Ht 1.981 m (6' 6\")   Wt (!) 144.7 kg (319 lb)   SpO2 97%   BMI 36.86 kg/m²      ESS: [place] Sleepiness Scale (ESS) completed in the office today with a total score of 0/24.     LVEF from [facility] Lexiscan 8/1/2024: 36%    General: NAD; A&Ox3.  HEENT: Mallampati class 3-4 airway; no noted retro or micrognathia.  Pulmonary: No wheezing or crackles noted upon ausculation bilaterally.  Cardiovascular: Bilateral LEs without noted edema; RRR, no noted murmurs upon auscultation; radial pulses equal and palpable bilaterally.  Neurologic:  strength equal bilaterally; gait appears normal; PERRL, EOM intact.  Psychiatric: Normal behavior and mood.    ASSESSMENT:   Diagnoses and all orders for this visit:    Severe sleep apnea    Central sleep apnea    Moderate obstructive sleep apnea    Essential hypertension    Persistent atrial fibrillation (CMS/HCC)    Restless sleeper             PLAN:  Patient sleep study results were reviewed with him.  The pathophysiology of sleep apnea (both obstructive and central) were reviewed with him.  Treatment options were discussed including weight loss, and PAP therapy.  Oral appliance therapy and phrenic nerve stimulation therapy were also mentioned for obstructive and central sleep apnea respectively.  I did recommend him to treat his sleep apnea, and to trial the CPAP before pursuing other modalities of treatment, as I educated him that PAP therapy would be the best chance to resolve both his central and obstructive apnea via the same modality.  He intends to " think about the options, and to follow-up in 1 month.  His most recent EF was 36%, so he would not be an ASV candidate at this time, though BiPAP could certainly be an option.    Education provided regarding: risks of sleep apnea, risks of driving drowsy/tired.     Follow-up with Primary Care Provider for general medical needs. Keep all scheduled medical appointments.    Follow-up at the Neurology Clinic in 1 month for Sleep Health, sooner as needed.      The patient is in agreement with above plan, verbalizes understanding, no further questions or concerns.  The patient has been advised to contact this clinic or the answering service with questions or concerns that may arise from today's visit.      A total of 55 minutes was required for this clinic visit.    A voice recognition program was used to aid in documentation of the record. Sometimes words are not printed exactly as they were spoken. While efforts were made to carefully edit and correct any inaccuracies, some may be present; please take these into context. Please contact the provider if errors are identified.         Jamil Lynn PA-C

## 2024-10-11 ENCOUNTER — OFFICE VISIT (OUTPATIENT)
Dept: NEUROLOGY | Facility: CLINIC | Age: 64
End: 2024-10-11
Payer: MEDICARE

## 2024-10-11 VITALS — OXYGEN SATURATION: 97 % | WEIGHT: 315 LBS | BODY MASS INDEX: 36.45 KG/M2 | HEART RATE: 87 BPM | HEIGHT: 78 IN

## 2024-10-11 DIAGNOSIS — G47.9 RESTLESS SLEEPER: ICD-10-CM

## 2024-10-11 DIAGNOSIS — I10 ESSENTIAL HYPERTENSION: Chronic | ICD-10-CM

## 2024-10-11 DIAGNOSIS — I48.19 PERSISTENT ATRIAL FIBRILLATION (CMS/HCC): Chronic | ICD-10-CM

## 2024-10-11 DIAGNOSIS — G47.31 CENTRAL SLEEP APNEA: ICD-10-CM

## 2024-10-11 DIAGNOSIS — G47.33 MODERATE OBSTRUCTIVE SLEEP APNEA: ICD-10-CM

## 2024-10-11 DIAGNOSIS — G47.30 SEVERE SLEEP APNEA: Primary | ICD-10-CM

## 2024-10-11 PROCEDURE — 99215 OFFICE O/P EST HI 40 MIN: CPT | Performed by: PHYSICIAN ASSISTANT

## 2024-10-11 PROCEDURE — G0463 HOSPITAL OUTPT CLINIC VISIT: HCPCS | Performed by: PHYSICIAN ASSISTANT

## 2024-10-11 NOTE — PATIENT INSTRUCTIONS
You have been diagnosed with sleep apnea or another sleep disorder which may adversely affect your driving. People with sleep apnea or other sleep disorders may have a threefold increased rate of automobile crashes or other accidents. These accidents may cause serious injury or death to yourself or others. If you have had an automobile crash or frequent near-crashes due to sleepiness or inattention, you should stop driving and operating dangerous machinery until your sleep disorder has been treated and you no longer become sleepy or inattentive while driving. It is your responsibility not to drive if you are sleepy or inattentive while driving. Until your treatment has begun, have your spouse or friends drive for you. If you drive against medical advice, plan ahead so that you avoid driving at times of day when you are most likely to be sleepy, such as mid-afternoon, late at night, or after a poor night's sleep. If you have further concerns about your driving, talk to your doctor.        Living With Sleep Apnea  Sleep apnea is a condition in which breathing pauses or becomes shallow during sleep. Sleep apnea is most commonly caused by a collapsed or blocked airway. People with sleep apnea snore loudly and have times when they gasp and stop breathing for 10 seconds or more during sleep. This happens over and over during the night. This disrupts your sleep and keeps your body from getting the rest that it needs, which can cause tiredness and lack of energy (fatigue) during the day.  The breaks in breathing also interrupt the deep sleep that you need to feel rested. Even if you do not completely wake up from the gaps in breathing, your sleep may not be restful. You may also have a headache in the morning and low energy during the day, and you may feel anxious or depressed.  How can sleep apnea affect me?  Sleep apnea increases your chances of extreme tiredness during the day (daytime fatigue). It can also increase your  risk for health conditions, such as:  Heart attack.  Stroke.  Diabetes.  Heart failure.  Irregular heartbeat.  High blood pressure.  If you have daytime fatigue as a result of sleep apnea, you may be more likely to:  Perform poorly at school or work.  Fall asleep while driving.  Have difficulty with attention.  Develop depression or anxiety.  Become severely overweight (obese).  Have sexual dysfunction.  What actions can I take to manage sleep apnea?  Sleep apnea treatment  If you were given a device to open your airway while you sleep, use it only as told by your health care provider. You may be given:  An oral appliance. This is a custom-made mouthpiece that shifts your lower jaw forward.  A continuous positive airway pressure (CPAP) device. This device blows air through a mask when you breathe out (exhale).  A nasal expiratory positive airway pressure (EPAP) device. This device has valves that you put into each nostril.  A bi-level positive airway pressure (BPAP) device. This device blows air through a mask when you breathe in (inhale) and breathe out (exhale).  You may need surgery if other treatments do not work for you.       Sleep habits  Go to sleep and wake up at the same time every day. This helps set your internal clock (circadian rhythm) for sleeping.  If you stay up later than usual, such as on weekends, try to get up in the morning within 2 hours of your normal wake time.  Try to get at least 7-9 hours of sleep each night.  Stop computer, tablet, and mobile phone use a few hours before bedtime.  Do not take long naps during the day. If you nap, limit it to 30 minutes.  Have a relaxing bedtime routine. Reading or listening to music may relax you and help you sleep.  Use your bedroom only for sleep.  Keep your television and computer out of your bedroom.  Keep your bedroom cool, dark, and quiet.  Use a supportive mattress and pillows.  Follow your health care provider's instructions for other changes to  sleep habits.  Nutrition  Do not eat heavy meals in the evening.  Do not have caffeine in the later part of the day. The effects of caffeine can last for more than 5 hours.  Follow your health care provider's or dietitian's instructions for any diet changes.  Lifestyle  Do not drink alcohol before bedtime. Alcohol can cause you to fall asleep at first, but then it can cause you to wake up in the middle of the night and have trouble getting back to sleep.  Do not use any products that contain nicotine or tobacco, such as cigarettes and e-cigarettes. If you need help quitting, ask your health care provider.               Medicines  Take over-the-counter and prescription medicines only as told by your health care provider.  Do not use over-the-counter sleep medicine. You can become dependent on this medicine, and it can make sleep apnea worse.  Do not use medicines, such as sedatives and narcotics, unless told by your health care provider.  Activity  Exercise on most days, but avoid exercising in the evening. Exercising near bedtime can interfere with sleeping.  If possible, spend time outside every day. Natural light helps regulate your circadian rhythm.  General information  Lose weight if you need to, and maintain a healthy weight.  Keep all follow-up visits as told by your health care provider. This is important.  If you are having surgery, make sure to tell your health care provider that you have sleep apnea. You may need to bring your device with you.  Where to find more information  Learn more about sleep apnea and daytime fatigue from:  American Sleep Association: sleepassociation.org  National Sleep Foundation: sleepfoundation.org  National [employer] [place]: nhlbi.nih.gov  Summary  Sleep apnea can cause daytime fatigue and other serious health conditions.  Both sleep apnea and daytime fatigue can be bad for your health and well-being.  You may need to wear a device while sleeping to help keep  your airway open.  If you are having surgery, make sure to tell your health care provider that you have sleep apnea. You may need to bring your device with you.  Making changes to sleep habits, diet, lifestyle, and activity can help you manage sleep apnea.  This information is not intended to replace advice given to you by your health care provider. Make sure you discuss any questions you have with your health care provider.       Insomnia  Insomnia is a sleep disorder that makes it difficult to fall asleep or stay asleep. Insomnia can cause fatigue, low energy, difficulty concentrating, mood swings, and poor performance at work or school.  There are three different ways to classify insomnia:  Difficulty falling asleep.  Difficulty staying asleep.  Waking up too early in the morning.  Any type of insomnia can be long-term (chronic) or short-term (acute). Both are common. Short-term insomnia usually lasts for three months or less. Chronic insomnia occurs at least three times a week for longer than three months.  What are the causes?  Insomnia may be caused by another condition, situation, or substance, such as:  Anxiety.  Certain medicines.  Gastroesophageal reflux disease (GERD) or other gastrointestinal conditions.  Asthma or other breathing conditions.  Restless legs syndrome, sleep apnea, or other sleep disorders.  Chronic pain.  Menopause.  Stroke.  Abuse of alcohol, tobacco, or illegal drugs.  Mental health conditions, such as depression.  Caffeine.  Neurological disorders, such as Alzheimer's disease.  An overactive thyroid (hyperthyroidism).  Sometimes, the cause of insomnia may not be known.  What increases the risk?  Risk factors for insomnia include:  Gender. Women are affected more often than men.  Age. Insomnia is more common as you get older.  Stress.  Lack of exercise.  Irregular work schedule or working night shifts.  Traveling between different time zones.  Certain medical and mental health  conditions.  What are the signs or symptoms?  If you have insomnia, the main symptom is having trouble falling asleep or having trouble staying asleep. This may lead to other symptoms, such as:  Feeling fatigued or having low energy.  Feeling nervous about going to sleep.  Not feeling rested in the morning.  Having trouble concentrating.  Feeling irritable, anxious, or depressed.  How is this diagnosed?  This condition may be diagnosed based on:  Your symptoms and medical history. Your health care provider may ask about:  Your sleep habits.  Any medical conditions you have.  Your mental health.  A physical exam.  How is this treated?  Treatment for insomnia depends on the cause. Treatment may focus on treating an underlying condition that is causing insomnia. Treatment may also include:  Medicines to help you sleep.  Counseling or therapy.  Lifestyle adjustments to help you sleep better.  Follow these instructions at home:       Eating and drinking  Limit or avoid alcohol, caffeinated beverages, and cigarettes, especially close to bedtime. These can disrupt your sleep.  Do not eat a large meal or eat spicy foods right before bedtime. This can lead to digestive discomfort that can make it hard for you to sleep.       Sleep habits  Keep a sleep diary to help you and your health care provider figure out what could be causing your insomnia. Write down:  When you sleep.  When you wake up during the night.  How well you sleep.  How rested you feel the next day.  Any side effects of medicines you are taking.  What you eat and drink.  Make your bedroom a dark, comfortable place where it is easy to fall asleep.  Put up shades or blackout curtains to block light from outside.  Use a white noise machine to block noise.  Keep the temperature cool.  Limit screen use before bedtime. This includes:  Watching TV.  Using your smartphone, tablet, or computer.  Stick to a routine that includes going to bed and waking up at the same  times every day and night. This can help you fall asleep faster. Consider making a quiet activity, such as reading, part of your nighttime routine.  Try to avoid taking naps during the day so that you sleep better at night.  Get out of bed if you are still awake after 15 minutes of trying to sleep. Keep the lights down, but try reading or doing a quiet activity. When you feel sleepy, go back to bed.       General instructions  Take over-the-counter and prescription medicines only as told by your health care provider.  Exercise regularly, as told by your health care provider. Avoid exercise starting several hours before bedtime.  Use relaxation techniques to manage stress. Ask your health care provider to suggest some techniques that may work well for you. These may include:  Breathing exercises.  Routines to release muscle tension.  Visualizing peaceful scenes.  Make sure that you drive carefully. Avoid driving if you feel very sleepy.  Keep all follow-up visits as told by your health care provider. This is important.  Contact a health care provider if:  You are tired throughout the day.  You have trouble in your daily routine due to sleepiness.  You continue to have sleep problems, or your sleep problems get worse.  Get help right away if:  You have serious thoughts about hurting yourself or someone else.  If you ever feel like you may hurt yourself or others, or have thoughts about taking your own life, get help right away. You can go to your nearest emergency department or call:  Your local emergency services (911 in the [place]  A suicide crisis helpline, such as the National Suicide Prevention Lifeline at 1-333.792.3351. This is open 24 hours a day.  Summary  Insomnia is a sleep disorder that makes it difficult to fall asleep or stay asleep.  Insomnia can be long-term (chronic) or short-term (acute).  Treatment for insomnia depends on the cause. Treatment may focus on treating an underlying condition that is  causing insomnia.  Keep a sleep diary to help you and your health care provider figure out what could be causing your insomnia.  This information is not intended to replace advice given to you by your health care provider. Make sure you discuss any questions you have with your health care provider.

## 2024-10-21 ENCOUNTER — TELEPHONE (OUTPATIENT)
Dept: CARDIOLOGY | Facility: CLINIC | Age: 64
End: 2024-10-21
Payer: MEDICARE

## 2024-10-21 NOTE — TELEPHONE ENCOUNTER
Jc called with questions regarding his EF on his echo compared to his stress test and the treatment options for his sleep apnea.    His echo done 2/21/24 shows EF at 47%.  His stress test done 8/1/24 shows EF at 36%.  The sleep provider wrote in his note:      Jc wants Dr. Walsh opinion if his EF is 36 % or 47% and if that changes his options for his sleep apnea treatment.

## 2024-10-21 NOTE — TELEPHONE ENCOUNTER
pt left  to set up appt with [facility], sent email to [facility] scheduling 10/21/24 [facility]

## 2024-10-23 ENCOUNTER — ANTICOAGULATION VISIT (OUTPATIENT)
Dept: ANTICOAGULATION | Facility: CLINIC | Age: 64
End: 2024-10-23
Payer: MEDICARE

## 2024-10-23 VITALS — BODY MASS INDEX: 37.1 KG/M2 | SYSTOLIC BLOOD PRESSURE: 123 MMHG | WEIGHT: 315 LBS | DIASTOLIC BLOOD PRESSURE: 56 MMHG

## 2024-10-23 DIAGNOSIS — I48.19 PERSISTENT ATRIAL FIBRILLATION (CMS/HCC): Primary | Chronic | ICD-10-CM

## 2024-10-23 LAB — INR PPP: 2.1 (ref 0.9–1.1)

## 2024-10-23 PROCEDURE — G0463 HOSPITAL OUTPT CLINIC VISIT: HCPCS

## 2024-10-23 PROCEDURE — 85610 PROTHROMBIN TIME: CPT | Mod: QW

## 2024-10-23 ASSESSMENT — PAIN SCALES - GENERAL: PAINLEVEL: 0-NO PAIN

## 2024-10-23 NOTE — PROGRESS NOTES
Subjective   Brando Sexton presents to the anticoagulation clinic for monitoring of his long term warfarin therapy.       Today [facility] reports the following:   Bleeding signs/symptoms: No   Major bleeding event: No  Thrombosis signs/symptoms: No  Thromboembolic event: No  Missed doses: No  Medication changes: No  Dietary changes: No  Bacterial/viral infection: no  Other concerns: No    Current Outpatient Medications   Medication Sig Dispense Refill    febuxostat (ULORIC) 80 mg tablet Take 1 tablet (80 mg total) by mouth daily 90 tablet 3    losartan (Cozaar) 25 mg tablet Take 1 tablet (25 mg total) by mouth daily 90 tablet 3    carvediloL (COREG) 6.25 mg tablet Take 1 tablet (6.25 mg total) by mouth 2 (two) times a day Unknown mg 180 tablet 3    vitamin E,dl-alpha tocopherol, (VITAMIN E, BULK, MISC) 1 tablet daily      spironolactone (ALDACTONE) 25 mg tablet Take 0.5 tablets (12.5 mg total) by mouth daily 15 tablet 11    tacrolimus (PROTOPIC) 0.1 % ointment Apply thin layers over rash twice daily as needed. Safe to use on face. Safe to use daily. If this burns, mix with vaseline when you apply. 30 g 3    triamcinolone (KENALOG) 0.1 % cream Apply thin layer over affected areas twice daily for two weeks then twice weekly for maintenance. OK to increase use again for flares. Avoid face, armpits, and groin. 454 g 3    triamcinolone (KENALOG) 0.1 % ointment Apply thin layer over affected areas twice daily for two weeks then twice weekly for maintenance. OK to increase use again for flares. Avoid face, armpits and groin. 454 g 3    furosemide (LASIX) 20 mg tablet Take 1 tablet (20 mg total) by mouth daily 90 tablet 3    warfarin (COUMADIN) 4 mg tablet Alternate 2mg with 4mg tablet every other day 45 tablet 3    warfarin (COUMADIN) 2 mg tablet Alternate 2mg with 4mg tablet every other day 45 tablet 3    ascorbic acid (VITAMIN C ORAL) Take by mouth       No current facility-administered medications for this visit.          Objective     /56 (BP Location: Left arm, Patient Position: Sitting, Cuff Size: Regular Adult)   Wt (!) 145.6 kg (321 lb)   BMI 37.10 kg/m²     Assessment/Plan      Anticoagulation Summary  As of 10/23/2024      INR goal:  2.0-3.0   INR used for dosin.1 (10/23/2024)   Full warfarin instructions:  2 mg every Sun, e, Thu; 4 mg all other days   No change documented:  Fredi Calles, PharmD   Next INR check:  2024   Priority:  Maintenance    Indications    Persistent atrial fibrillation (CMS/HCC) [I48.19]                 Anticoagulation Care Providers       Provider Role Specialty Phone number    Avery Gordon MD Referring Family Medicine [phone]            Anticoagulation therapy status: INR is stable, no dose adjustment required.  Patient verbalized understanding regarding dose, monitoring and INR.    Additional education : follow up in one month      Please reference Anticoagulation episode for additional documentation.

## 2024-11-04 NOTE — PROGRESS NOTES
11/05/24    CC: sleep apnea        SUBJECTIVE:     HPI:    Brando Sexton is a 64 y.o. male who [facility] for complex sleep apnea.       In regard to the pt's most recent sleep study:              Sleep study date: 5/9/2024  Weight at time of study: 141.1 kg  AHI: 36.9 (52% or 19.2 centrals/HR, 1.8 mixed/HR)  Minimum O2 saturation: 80%  Recommended: Titration study      Patient is here for a 1 month follow-up to discuss sleep treatment options.  The previous night before today's visit he got a sleeping wedge, and noticed that he woke up less often at night.  His wife also told him in the morning that he is sending like he is breathing more normal, whereas typically he is very labored in the morning.  Today he would like to do the following: His sleep with a wedge moving forward, and do an HST around end of January/early February to see how his sleep looks with that.  He would also like a referral to lifestyle medicine.        ROS:  General: Denied daytime somnolence, driving while drowsy.  HEENT: Denied dry mouth.  Pulmonary: Denied snoring, choking/gasping during sleep.  Cardiovascular: Denied leg edema, palpitations.  Neurologic: Denied morning headaches; difficulty with attention/memory.     Allergies:   Allergies   Allergen Reactions    Allopurinol     Cephalexin     Nizoral [Ketoconazole] Other (see comments)     Stinging and burning       Histories:    PMHx:   Past Medical History:   Diagnosis Date    Atrial fibrillation (CMS/HCC)     COVID-19 01/14/2022    Gout     Headache 10/17/2023    Injury of knee 10/17/2023    Pain in joint, shoulder region 01/25/2012    Note: Unchanged    Psoriasis     Sprain of rotator cuff capsule 07/12/2011    Note: Unchanged       PSHx:   Past Surgical History:   Procedure Laterality Date    CARDIOVERSION  03/2010    COLONOSCOPY  01/01/1995    ORTHOPEDIC SURGERY Bilateral     Knee Scope    OTHER SURGICAL HISTORY Right      Orthopedic:Rotator cuff x2 Right shoulder       FamHx:   Family History   Problem Relation Age of Onset    Heart disease Mother     Colon cancer Mother     Heart disease Father         MI at age 87    Other Father         Crohns    Psoriasis Father     Asthma Father     Diabetes type II Father's Sister     Heart disease Father's Brother        SocHx:   Social History     Socioeconomic History    Marital status:      Spouse name: Not on file    Number of children: Not on file    Years of education: Not on file    Highest education level: Not on file   Occupational History    Not on file   Tobacco Use    Smoking status: Former    Smokeless tobacco: Former   Vaping Use    Vaping status: Never Used   Substance and Sexual Activity    Alcohol use: No    Drug use: Never    Sexual activity: Defer   Other Topics Concern    Not on file   Social History Narrative    Not on file     Social Determinants of Health     Financial Resource Strain: Patient Declined (12/19/2023)    Overall Financial Resource Strain (CARDIA)     Difficulty of Paying Living Expenses: Patient declined   Food Insecurity: Patient Declined (12/19/2023)    Hunger Vital Sign     Worried About Running Out of Food in the Last Year: Patient declined     Ran Out of Food in the Last Year: Patient declined   Transportation Needs: No Transportation Needs (12/19/2023)    PRAPARE - Transportation     Lack of Transportation (Medical): No     Lack of Transportation (Non-Medical): No   Physical Activity: Insufficiently Active (12/19/2023)    Exercise Vital Sign     Days of Exercise per Week: 1 day     Minutes of Exercise per Session: 60 min   Stress: No Stress Concern Present (12/19/2023)    [place] [place] [employer] - Occupational Stress Questionnaire     Feeling of Stress : Not at all   Social Connections: Unknown (12/19/2023)    Social Connection and Isolation Panel [NHANES]     Frequency of Communication with Friends and Family: Patient declined      Frequency of Social Gatherings with Friends and Family: Patient declined     Attends Congregation Services: Patient declined     Active Member of Clubs or Organizations: Patient declined     Attends Club or Organization Meetings: Never     Marital Status:    Intimate Partner Violence: Unknown (12/19/2023)    Humiliation, Afraid, Rape, and Kick questionnaire     Fear of Current or Ex-Partner: Patient declined     Emotionally Abused: Patient declined     Physically Abused: Not on file     Sexually Abused: No   Housing Stability: Unknown (12/19/2023)    Housing Stability Vital Sign     Unable to Pay for Housing in the Last Year: Patient refused     Number of Places Lived in the Last Year: Not on file     Unstable Housing in the Last Year: Patient refused       Medications:   Current Outpatient Medications   Medication Sig Dispense Refill    febuxostat (ULORIC) 80 mg tablet Take 1 tablet (80 mg total) by mouth daily 90 tablet 3    losartan (Cozaar) 25 mg tablet Take 1 tablet (25 mg total) by mouth daily 90 tablet 3    carvediloL (COREG) 6.25 mg tablet Take 1 tablet (6.25 mg total) by mouth 2 (two) times a day Unknown mg 180 tablet 3    vitamin E,dl-alpha tocopherol, (VITAMIN E, BULK, MISC) 1 tablet daily      spironolactone (ALDACTONE) 25 mg tablet Take 0.5 tablets (12.5 mg total) by mouth daily 15 tablet 11    tacrolimus (PROTOPIC) 0.1 % ointment Apply thin layers over rash twice daily as needed. Safe to use on face. Safe to use daily. If this burns, mix with vaseline when you apply. 30 g 3    triamcinolone (KENALOG) 0.1 % cream Apply thin layer over affected areas twice daily for two weeks then twice weekly for maintenance. OK to increase use again for flares. Avoid face, armpits, and groin. 454 g 3    triamcinolone (KENALOG) 0.1 % ointment Apply thin layer over affected areas twice daily for two weeks then twice weekly for maintenance. OK to increase use again for flares. Avoid face, armpits and groin. 454 g  3    furosemide (LASIX) 20 mg tablet Take 1 tablet (20 mg total) by mouth daily 90 tablet 3    warfarin (COUMADIN) 4 mg tablet Alternate 2mg with 4mg tablet every other day 45 tablet 3    warfarin (COUMADIN) 2 mg tablet Alternate 2mg with 4mg tablet every other day 45 tablet 3    ascorbic acid (VITAMIN C ORAL) Take by mouth       No current facility-administered medications for this visit.         OBJECTIVE:   Vitals: Pulse 75   SpO2 97%      ESS: [place] Sleepiness Scale (ESS) completed in the office today with a total score of 0/24.    LVEF from [facility] 8/1/2024: 36%     General: NAD; A&Ox3.  HEENT: Mallampati class 3-4 airway; no noted retro or micrognathia.  Pulmonary: No wheezing or crackles noted upon ausculation bilaterally.  Cardiovascular: Bilateral LEs without noted edema; RRR, no noted murmurs upon auscultation; radial pulses equal and palpable bilaterally.  Neurologic:  strength equal bilaterally; gait appears normal; PERRL, EOM intact.  Psychiatric: Normal behavior and mood.    ASSESSMENT:   Diagnoses and all orders for this visit:    Severe sleep apnea  -     Home sleep test; Future    Central sleep apnea  -     Home sleep test; Future    Moderate obstructive sleep apnea  -     Home sleep test; Future    Persistent atrial fibrillation (CMS/HCC)    Chronic systolic heart failure (CMS/HCC)    Essential hypertension    Restless sleeper    BMI 37.0-37.9, adult  -     Ambulatory referral for Nutrition Services; Future             PLAN:  HST ordered for end of January/early February.  Lifestyle medicine referral placed.  Depending on what HST shows with wedge will likely need to get patient in for a follow-up.    Education provided regarding: risks of obstructive sleep apnea, risks of driving drowsy/tired.     Follow-up with Primary Care Provider for general medical needs. Keep all scheduled medical appointments.    Follow-up at the Neurology Clinic will be organized after the patient's HST.       The patient is in agreement with above plan, verbalizes understanding, no further questions or concerns.  The patient has been advised to contact this clinic or the answering service with questions or concerns that may arise from today's visit.      A total of 35 minutes was required for this clinic visit.    A voice recognition program was used to aid in documentation of the record. Sometimes words are not printed exactly as they were spoken. While efforts were made to carefully edit and correct any inaccuracies, some may be present; please take these into context. Please contact the provider if errors are identified.         Jamil Lynn PA-C

## 2024-11-05 ENCOUNTER — OFFICE VISIT (OUTPATIENT)
Dept: NEUROLOGY | Facility: CLINIC | Age: 64
End: 2024-11-05
Payer: MEDICARE

## 2024-11-05 VITALS — OXYGEN SATURATION: 97 % | HEART RATE: 75 BPM

## 2024-11-05 DIAGNOSIS — G47.31 CENTRAL SLEEP APNEA: ICD-10-CM

## 2024-11-05 DIAGNOSIS — G47.30 SEVERE SLEEP APNEA: Primary | ICD-10-CM

## 2024-11-05 DIAGNOSIS — G47.9 RESTLESS SLEEPER: ICD-10-CM

## 2024-11-05 DIAGNOSIS — I50.22 CHRONIC SYSTOLIC HEART FAILURE (CMS/HCC): ICD-10-CM

## 2024-11-05 DIAGNOSIS — G47.33 MODERATE OBSTRUCTIVE SLEEP APNEA: ICD-10-CM

## 2024-11-05 DIAGNOSIS — I48.19 PERSISTENT ATRIAL FIBRILLATION (CMS/HCC): Chronic | ICD-10-CM

## 2024-11-05 DIAGNOSIS — I10 ESSENTIAL HYPERTENSION: Chronic | ICD-10-CM

## 2024-11-05 PROCEDURE — G0463 HOSPITAL OUTPT CLINIC VISIT: HCPCS | Performed by: PHYSICIAN ASSISTANT

## 2024-11-05 PROCEDURE — 99214 OFFICE O/P EST MOD 30 MIN: CPT | Performed by: PHYSICIAN ASSISTANT

## 2024-11-05 NOTE — PATIENT INSTRUCTIONS
You have been diagnosed with sleep apnea or another sleep disorder which may adversely affect your driving. People with sleep apnea or other sleep disorders may have a threefold increased rate of automobile crashes or other accidents. These accidents may cause serious injury or death to yourself or others. If you have had an automobile crash or frequent near-crashes due to sleepiness or inattention, you should stop driving and operating dangerous machinery until your sleep disorder has been treated and you no longer become sleepy or inattentive while driving. It is your responsibility not to drive if you are sleepy or inattentive while driving. Until your treatment has begun, have your spouse or friends drive for you. If you drive against medical advice, plan ahead so that you avoid driving at times of day when you are most likely to be sleepy, such as mid-afternoon, late at night, or after a poor night's sleep. If you have further concerns about your driving, talk to your doctor.        Living With Sleep Apnea  Sleep apnea is a condition in which breathing pauses or becomes shallow during sleep. Sleep apnea is most commonly caused by a collapsed or blocked airway. People with sleep apnea snore loudly and have times when they gasp and stop breathing for 10 seconds or more during sleep. This happens over and over during the night. This disrupts your sleep and keeps your body from getting the rest that it needs, which can cause tiredness and lack of energy (fatigue) during the day.  The breaks in breathing also interrupt the deep sleep that you need to feel rested. Even if you do not completely wake up from the gaps in breathing, your sleep may not be restful. You may also have a headache in the morning and low energy during the day, and you may feel anxious or depressed.  How can sleep apnea affect me?  Sleep apnea increases your chances of extreme tiredness during the day (daytime fatigue). It can also increase your  risk for health conditions, such as:  Heart attack.  Stroke.  Diabetes.  Heart failure.  Irregular heartbeat.  High blood pressure.  If you have daytime fatigue as a result of sleep apnea, you may be more likely to:  Perform poorly at school or work.  Fall asleep while driving.  Have difficulty with attention.  Develop depression or anxiety.  Become severely overweight (obese).  Have sexual dysfunction.  What actions can I take to manage sleep apnea?  Sleep apnea treatment  If you were given a device to open your airway while you sleep, use it only as told by your health care provider. You may be given:  An oral appliance. This is a custom-made mouthpiece that shifts your lower jaw forward.  A continuous positive airway pressure (CPAP) device. This device blows air through a mask when you breathe out (exhale).  A nasal expiratory positive airway pressure (EPAP) device. This device has valves that you put into each nostril.  A bi-level positive airway pressure (BPAP) device. This device blows air through a mask when you breathe in (inhale) and breathe out (exhale).  You may need surgery if other treatments do not work for you.       Sleep habits  Go to sleep and wake up at the same time every day. This helps set your internal clock (circadian rhythm) for sleeping.  If you stay up later than usual, such as on weekends, try to get up in the morning within 2 hours of your normal wake time.  Try to get at least 7-9 hours of sleep each night.  Stop computer, tablet, and mobile phone use a few hours before bedtime.  Do not take long naps during the day. If you nap, limit it to 30 minutes.  Have a relaxing bedtime routine. Reading or listening to music may relax you and help you sleep.  Use your bedroom only for sleep.  Keep your television and computer out of your bedroom.  Keep your bedroom cool, dark, and quiet.  Use a supportive mattress and pillows.  Follow your health care provider's instructions for other changes to  sleep habits.  Nutrition  Do not eat heavy meals in the evening.  Do not have caffeine in the later part of the day. The effects of caffeine can last for more than 5 hours.  Follow your health care provider's or dietitian's instructions for any diet changes.  Lifestyle  Do not drink alcohol before bedtime. Alcohol can cause you to fall asleep at first, but then it can cause you to wake up in the middle of the night and have trouble getting back to sleep.  Do not use any products that contain nicotine or tobacco, such as cigarettes and e-cigarettes. If you need help quitting, ask your health care provider.               Medicines  Take over-the-counter and prescription medicines only as told by your health care provider.  Do not use over-the-counter sleep medicine. You can become dependent on this medicine, and it can make sleep apnea worse.  Do not use medicines, such as sedatives and narcotics, unless told by your health care provider.  Activity  Exercise on most days, but avoid exercising in the evening. Exercising near bedtime can interfere with sleeping.  If possible, spend time outside every day. Natural light helps regulate your circadian rhythm.  General information  Lose weight if you need to, and maintain a healthy weight.  Keep all follow-up visits as told by your health care provider. This is important.  If you are having surgery, make sure to tell your health care provider that you have sleep apnea. You may need to bring your device with you.  Where to find more information  Learn more about sleep apnea and daytime fatigue from:  American Sleep Association: sleepassociation.org  National Sleep Foundation: sleepfoundation.org  National Heart, Lung, and Blood [place]: nhlbi.nih.gov  Summary  Sleep apnea can cause daytime fatigue and other serious health conditions.  Both sleep apnea and daytime fatigue can be bad for your health and well-being.  You may need to wear a device while sleeping to help keep  your airway open.  If you are having surgery, make sure to tell your health care provider that you have sleep apnea. You may need to bring your device with you.  Making changes to sleep habits, diet, lifestyle, and activity can help you manage sleep apnea.  This information is not intended to replace advice given to you by your health care provider. Make sure you discuss any questions you have with your health care provider.       Insomnia  Insomnia is a sleep disorder that makes it difficult to fall asleep or stay asleep. Insomnia can cause fatigue, low energy, difficulty concentrating, mood swings, and poor performance at work or school.  There are three different ways to classify insomnia:  Difficulty falling asleep.  Difficulty staying asleep.  Waking up too early in the morning.  Any type of insomnia can be long-term (chronic) or short-term (acute). Both are common. Short-term insomnia usually lasts for three months or less. Chronic insomnia occurs at least three times a week for longer than three months.  What are the causes?  Insomnia may be caused by another condition, situation, or substance, such as:  Anxiety.  Certain medicines.  Gastroesophageal reflux disease (GERD) or other gastrointestinal conditions.  Asthma or other breathing conditions.  Restless legs syndrome, sleep apnea, or other sleep disorders.  Chronic pain.  Menopause.  Stroke.  Abuse of alcohol, tobacco, or illegal drugs.  Mental health conditions, such as depression.  Caffeine.  Neurological disorders, such as Alzheimer's disease.  An overactive thyroid (hyperthyroidism).  Sometimes, the cause of insomnia may not be known.  What increases the risk?  Risk factors for insomnia include:  Gender. Women are affected more often than men.  Age. Insomnia is more common as you get older.  Stress.  Lack of exercise.  Irregular work schedule or working night shifts.  Traveling between different time zones.  Certain medical and mental health  conditions.  What are the signs or symptoms?  If you have insomnia, the main symptom is having trouble falling asleep or having trouble staying asleep. This may lead to other symptoms, such as:  Feeling fatigued or having low energy.  Feeling nervous about going to sleep.  Not feeling rested in the morning.  Having trouble concentrating.  Feeling irritable, anxious, or depressed.  How is this diagnosed?  This condition may be diagnosed based on:  Your symptoms and medical history. Your health care provider may ask about:  Your sleep habits.  Any medical conditions you have.  Your mental health.  A physical exam.  How is this treated?  Treatment for insomnia depends on the cause. Treatment may focus on treating an underlying condition that is causing insomnia. Treatment may also include:  Medicines to help you sleep.  Counseling or therapy.  Lifestyle adjustments to help you sleep better.  Follow these instructions at home:       Eating and drinking  Limit or avoid alcohol, caffeinated beverages, and cigarettes, especially close to bedtime. These can disrupt your sleep.  Do not eat a large meal or eat spicy foods right before bedtime. This can lead to digestive discomfort that can make it hard for you to sleep.       Sleep habits  Keep a sleep diary to help you and your health care provider figure out what could be causing your insomnia. Write down:  When you sleep.  When you wake up during the night.  How well you sleep.  How rested you feel the next day.  Any side effects of medicines you are taking.  What you eat and drink.  Make your bedroom a dark, comfortable place where it is easy to fall asleep.  Put up shades or blackout curtains to block light from outside.  Use a white noise machine to block noise.  Keep the temperature cool.  Limit screen use before bedtime. This includes:  Watching TV.  Using your smartphone, tablet, or computer.  Stick to a routine that includes going to bed and waking up at the same  times every day and night. This can help you fall asleep faster. Consider making a quiet activity, such as reading, part of your nighttime routine.  Try to avoid taking naps during the day so that you sleep better at night.  Get out of bed if you are still awake after 15 minutes of trying to sleep. Keep the lights down, but try reading or doing a quiet activity. When you feel sleepy, go back to bed.       General instructions  Take over-the-counter and prescription medicines only as told by your health care provider.  Exercise regularly, as told by your health care provider. Avoid exercise starting several hours before bedtime.  Use relaxation techniques to manage stress. Ask your health care provider to suggest some techniques that may work well for you. These may include:  Breathing exercises.  Routines to release muscle tension.  Visualizing peaceful scenes.  Make sure that you drive carefully. Avoid driving if you feel very sleepy.  Keep all follow-up visits as told by your health care provider. This is important.  Contact a health care provider if:  You are tired throughout the day.  You have trouble in your daily routine due to sleepiness.  You continue to have sleep problems, or your sleep problems get worse.  Get help right away if:  You have serious thoughts about hurting yourself or someone else.  If you ever feel like you may hurt yourself or others, or have thoughts about taking your own life, get help right away. You can go to your nearest emergency department or call:  Your local emergency services (911 in the [place]  A suicide crisis helpline, such as the National Suicide Prevention Lifeline at 1-616.822.3853. This is open 24 hours a day.  Summary  Insomnia is a sleep disorder that makes it difficult to fall asleep or stay asleep.  Insomnia can be long-term (chronic) or short-term (acute).  Treatment for insomnia depends on the cause. Treatment may focus on treating an underlying condition that is  causing insomnia.  Keep a sleep diary to help you and your health care provider figure out what could be causing your insomnia.  This information is not intended to replace advice given to you by your health care provider. Make sure you discuss any questions you have with your health care provider.

## 2024-11-22 ENCOUNTER — OFFICE VISIT (OUTPATIENT)
Dept: FAMILY MEDICINE | Facility: CLINIC | Age: 64
End: 2024-11-22
Payer: MEDICARE

## 2024-11-22 VITALS
TEMPERATURE: 97.7 F | DIASTOLIC BLOOD PRESSURE: 70 MMHG | BODY MASS INDEX: 38.36 KG/M2 | OXYGEN SATURATION: 95 % | HEART RATE: 79 BPM | HEIGHT: 76 IN | WEIGHT: 315 LBS | SYSTOLIC BLOOD PRESSURE: 118 MMHG

## 2024-11-22 DIAGNOSIS — E66.01 MORBID OBESITY (CMS/HCC): ICD-10-CM

## 2024-11-22 DIAGNOSIS — I48.19 PERSISTENT ATRIAL FIBRILLATION (CMS/HCC): Primary | Chronic | ICD-10-CM

## 2024-11-22 PROCEDURE — G2211 COMPLEX E/M VISIT ADD ON: HCPCS | Performed by: FAMILY MEDICINE

## 2024-11-22 PROCEDURE — 99214 OFFICE O/P EST MOD 30 MIN: CPT | Performed by: FAMILY MEDICINE

## 2024-11-22 PROCEDURE — G0463 HOSPITAL OUTPT CLINIC VISIT: HCPCS | Performed by: FAMILY MEDICINE

## 2024-11-22 NOTE — PROGRESS NOTES
Brando Sexton is a 64 y.o. male who presents for:    referral (Needs referral for dietician to try and reduce sleep apnoea.)        ---------------------------------------------------------------------    ASSESSMENT  AND PLAN 11/22/24:          Dear Brando Sxeton,    Please find a copy of my assessment and plan below for your review. A majority of the assessment and plan was transcribed by the artificial intelligence application called [employer].   Furthermore, at the bottom, you will find a list of diagnoses and orders.    If you have questions or find errors please call my nurse, Ruth, at [phone]; or, you can send her a nonemergent message through [employer].     JOANNA   Assessment/Plan       Atrial Fibrillation with Reduced Ejection Fraction  Long-standing persistent atrial fibrillation, likely for the last decade. Recent stress test on 08/01/2024 showed no ischemia, but EF was 36%. Previous echocardiogram in February 2024 showed EF at 47%, indicating mild global ventricular systolic dysfunction. The discrepancy between stress test and echocardiogram EF values suggests the need for further evaluation. Discussed importance of rate control and potential for more aggressive measures if EF remains low. Risks of untreated atrial fibrillation include worsening heart failure and thromboembolic events. Benefits of aggressive rate control include potential improvement in EF and symptom management. If EF remains low, more aggressive measures such as pacing may be considered. Patient expressed fear of MRI but agreed to follow-up with electrophysiology.  - Send referral to electrophysiology for follow-up with Dr. Casillas  - Ensure longitudinal follow-up of EF  - Consider repeat echocardiogram for more accurate EF assessment    Morbid obesity  - Consult dietitian    Obstructive and Central Sleep Apnea  Diagnosed with both obstructive and central sleep apnea. Discussed that weight loss may not cure  obstructive sleep apnea, as it is often due to muscle relaxation in the throat. Central sleep apnea is less amenable to treatment. Emphasized the importance of weight loss for overall cardiovascular health. Patient expressed desire to lose weight and avoid lifelong CPAP use.  - Refer to dietician for weight management  - Discuss potential benefits of CPAP therapy for obstructive sleep apnea    General Health Maintenance  Discussed the importance of weight loss for cardiovascular health. Emphasized that losing weight can reduce the workload on the heart and improve overall health.  - Encourage weight loss through diet and exercise  - Follow up with dietician for weight management    Follow-up  - Follow up with cardiology on 12/10/2024  - Ensure cardiology reviews Dr. Casillas's recommendations.              Diagnoses and all orders for this visit:    Persistent atrial fibrillation (CMS/HCC)  -     Ambulatory referral to Cardiology; Future    Morbid obesity (CMS/[facility])  -     Ambulatory referral for Nutrition Services; Future          ROXIE GUILLERMO MD  11/22/24    -----------------------------------------------------------------------      JOANNA History of Present Illness    Jc, a 64-year-old male with a history of atrial fibrillation, presents for follow-up after a recent consultation with electrophysiology and cardiology. He reports a probable decade-long history of persistent atrial fibrillation. Earlier this year, he underwent a stress test and echocardiogram to evaluate his cardiac function. The stress test, performed in August, showed no signs of ischemia, suggesting his coronary arteries are functioning well. However, his ejection fraction was notably low at 36%, indicating potential left ventricular systolic dysfunction. An echocardiogram performed in February showed a slightly higher ejection fraction of 47%, but still indicated mild global ventricular systolic dysfunction.    Jc also reports having both  obstructive and central sleep apnea. He expresses a desire to avoid long-term CPAP use and hopes that weight loss might alleviate some of his symptoms. He is scheduled to see a dietician in December to assist with this goal.    Despite his complex medical history, Jc remains active and engaged in his care. He is eager to explore all available treatment options and is committed to improving his health.             The following have been reviewed and updated as appropriate in this visit:        Allergies   Allergen Reactions    Allopurinol     Cephalexin     Nizoral [Ketoconazole] Other (see comments)     Stinging and burning     Current Outpatient Medications   Medication Sig Dispense Refill    febuxostat (ULORIC) 80 mg tablet Take 1 tablet (80 mg total) by mouth daily 90 tablet 3    losartan (Cozaar) 25 mg tablet Take 1 tablet (25 mg total) by mouth daily 90 tablet 3    carvediloL (COREG) 6.25 mg tablet Take 1 tablet (6.25 mg total) by mouth 2 (two) times a day Unknown mg 180 tablet 3    vitamin E,dl-alpha tocopherol, (VITAMIN E, BULK, MISC) 1 tablet daily      spironolactone (ALDACTONE) 25 mg tablet Take 0.5 tablets (12.5 mg total) by mouth daily 15 tablet 11    tacrolimus (PROTOPIC) 0.1 % ointment Apply thin layers over rash twice daily as needed. Safe to use on face. Safe to use daily. If this burns, mix with vaseline when you apply. 30 g 3    triamcinolone (KENALOG) 0.1 % cream Apply thin layer over affected areas twice daily for two weeks then twice weekly for maintenance. OK to increase use again for flares. Avoid face, armpits, and groin. 454 g 3    furosemide (LASIX) 20 mg tablet Take 1 tablet (20 mg total) by mouth daily 90 tablet 3    warfarin (COUMADIN) 2 mg tablet Alternate 2mg with 4mg tablet every other day 45 tablet 3    ascorbic acid (VITAMIN C ORAL) Take by mouth      triamcinolone (KENALOG) 0.1 % ointment Apply thin layer over affected areas twice daily for two weeks then twice weekly for  maintenance. OK to increase use again for flares. Avoid face, armpits and groin. 454 g 3    warfarin (COUMADIN) 4 mg tablet Alternate 2mg with 4mg tablet every other day 45 tablet 3     No current facility-administered medications for this visit.     Past Medical History:   Diagnosis Date    Atrial fibrillation (CMS/HCC)     COVID-19 01/14/2022    Gout     Headache 10/17/2023    Injury of knee 10/17/2023    Pain in joint, shoulder region 01/25/2012    Note: Unchanged    Psoriasis     Sprain of rotator cuff capsule 07/12/2011    Note: Unchanged     Past Surgical History:   Procedure Laterality Date    CARDIOVERSION  03/2010    COLONOSCOPY  01/01/1995    ORTHOPEDIC SURGERY Bilateral     Knee Scope    OTHER SURGICAL HISTORY Right     Orthopedic:Rotator cuff x2 Right shoulder     Family History   Problem Relation Age of Onset    Heart disease Mother     Colon cancer Mother     Heart disease Father         MI at age 87    Other Father         Crohns    Psoriasis Father     Asthma Father     Diabetes type II Father's Sister     Heart disease Father's Brother      Social History     Socioeconomic History    Marital status:    Tobacco Use    Smoking status: Former    Smokeless tobacco: Former   Vaping Use    Vaping status: Never Used   Substance and Sexual Activity    Alcohol use: No    Drug use: Never    Sexual activity: Defer     Social Drivers of Health     Tobacco Use: Medium Risk (11/22/2024)    Patient History     Smoking Tobacco Use: Former     Smokeless Tobacco Use: Former   Alcohol Use: Not At Risk (12/19/2023)    AUDIT-C     Frequency of Alcohol Consumption: Never     Average Number of Drinks: Patient does not drink     Frequency of Binge Drinking: Patient declined   Financial Resource Strain: Patient Declined (12/19/2023)    Overall Financial Resource Strain (CARDIA)     Difficulty of Paying Living Expenses: Patient declined   Food Insecurity: Patient Declined (12/19/2023)    Hunger Vital Sign     Worried  "About Running Out of Food in the Last Year: Patient declined     Ran Out of Food in the Last Year: Patient declined   Transportation Needs: No Transportation Needs (12/19/2023)    PRAPARE - Transportation     Lack of Transportation (Medical): No     Lack of Transportation (Non-Medical): No   Physical Activity: Insufficiently Active (12/19/2023)    Exercise Vital Sign     Days of Exercise per Week: 1 day     Minutes of Exercise per Session: 60 min   Stress: No Stress Concern Present (12/19/2023)    [employer] - Occupational Stress Questionnaire     Feeling of Stress : Not at all   Social Connections: Unknown (12/19/2023)    Social Connection and Isolation Panel [NHANES]     Frequency of Communication with Friends and Family: Patient declined     Frequency of Social Gatherings with Friends and Family: Patient declined     Attends Restorationism Services: Patient declined     Active Member of Clubs or Organizations: Patient declined     Attends Club or Organization Meetings: Never     Marital Status:    Housing Stability: Unknown (12/19/2023)    Housing Stability Vital Sign     Unable to Pay for Housing in the Last Year: Patient refused     Unstable Housing in the Last Year: Patient refused   Utilities: Patient Declined (12/19/2023)    [facility] Utilities     Threatened with loss of utilities: Patient refused       Review of Systems    Physical Exam:  /70   Pulse 79   Temp 36.5 °C (97.7 °F) (Temporal)   Ht 1.93 m (6' 4\")   Wt (!) 149.1 kg (328 lb 11.2 oz)   SpO2 95%   BMI 40.01 kg/m²   Physical Exam  Vitals and nursing note reviewed.   Constitutional:       General: He is not in acute distress.     Appearance: Normal appearance. He is well-developed. He is obese. He is not diaphoretic.   HENT:      Head: Normocephalic and atraumatic.      Nose: Nose normal.      Mouth/Throat:      Pharynx: No oropharyngeal exudate.   Eyes:      General: No scleral icterus.        Right eye: No " discharge.         Left eye: No discharge.      Conjunctiva/sclera: Conjunctivae normal.      Pupils: Pupils are equal, round, and reactive to light.   Neck:      Thyroid: No thyromegaly.      Vascular: No JVD.      Trachea: No tracheal deviation.   Cardiovascular:      Rate and Rhythm: Normal rate and regular rhythm.      Heart sounds: Normal heart sounds. No murmur heard.     No friction rub. No gallop.   Pulmonary:      Effort: Pulmonary effort is normal. No respiratory distress.      Breath sounds: Normal breath sounds. No stridor. No wheezing or rales.   Abdominal:      General: Bowel sounds are normal. There is no distension.      Palpations: Abdomen is soft.      Tenderness: There is no abdominal tenderness.   Musculoskeletal:         General: No deformity. Normal range of motion.      Cervical back: Neck supple.   Skin:     General: Skin is warm and dry.      Findings: No rash.   Neurological:      Mental Status: He is alert and oriented to person, place, and time.   Psychiatric:         Behavior: Behavior normal.         Thought Content: Thought content normal.               This document was transcribed using both or either DRAGON and [employer] software. Sometimes, words are not transcribed exactly as they are spoken.

## 2024-12-03 ENCOUNTER — TELEPHONE (OUTPATIENT)
Dept: CARDIOLOGY | Facility: CLINIC | Age: 64
End: 2024-12-03
Payer: MEDICARE
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
## 2024-12-07 DIAGNOSIS — I50.22 CHRONIC SYSTOLIC HEART FAILURE (CMS/HCC): ICD-10-CM

## 2024-12-09 RX ORDER — SPIRONOLACTONE 25 MG/1
12.5 TABLET ORAL DAILY
Qty: 45 TABLET | Refills: 0 | Status: SHIPPED | OUTPATIENT
Start: 2024-12-09 | End: 2024-12-19

## 2024-12-13 ENCOUNTER — OFFICE VISIT (OUTPATIENT)
Dept: CARDIOLOGY | Facility: CLINIC | Age: 64
End: 2024-12-13
Payer: MEDICARE

## 2024-12-13 VITALS
OXYGEN SATURATION: 95 % | RESPIRATION RATE: 18 BRPM | DIASTOLIC BLOOD PRESSURE: 60 MMHG | HEIGHT: 78 IN | HEART RATE: 94 BPM | SYSTOLIC BLOOD PRESSURE: 126 MMHG | BODY MASS INDEX: 36.45 KG/M2 | WEIGHT: 315 LBS

## 2024-12-13 DIAGNOSIS — I48.19 PERSISTENT ATRIAL FIBRILLATION (CMS/HCC): Primary | Chronic | ICD-10-CM

## 2024-12-13 PROCEDURE — 99213 OFFICE O/P EST LOW 20 MIN: CPT | Performed by: NURSE PRACTITIONER

## 2024-12-13 PROCEDURE — G0463 HOSPITAL OUTPT CLINIC VISIT: HCPCS | Performed by: NURSE PRACTITIONER

## 2024-12-13 PROCEDURE — 93005 ELECTROCARDIOGRAM TRACING: CPT | Performed by: NURSE PRACTITIONER

## 2024-12-13 ASSESSMENT — ENCOUNTER SYMPTOMS
DIZZINESS: 0
LIGHT-HEADEDNESS: 0
DYSPNEA ON EXERTION: 0
PALPITATIONS: 0
BRUISES/BLEEDS EASILY: 1
NEAR-SYNCOPE: 0
SHORTNESS OF BREATH: 0
IRREGULAR HEARTBEAT: 0

## 2024-12-13 ASSESSMENT — CHA2DS2-VASC SCORE
ESTIMATED_RISK_OF_ISCHEMIC_STROKE_IN_%: 7.2
ESTIMATED_RISK_OF_STROKE_TIA_EMBOLISM_IN_%: 10
CHA2DS2-VASC SCORE: 5

## 2024-12-13 ASSESSMENT — CHA2DS2 SCORE
DIABETES: NO
PRIOR STROKE OR TIA OR THROMBOEMBOLISM: YES
HYPERTENSION: YES
SEX: MALE
AGE IN YEARS: 65-74

## 2024-12-13 ASSESSMENT — PAIN SCALES - GENERAL: PAINLEVEL_OUTOF10: 0-NO PAIN

## 2024-12-13 NOTE — PATIENT INSTRUCTIONS
EKG shows controlled atrial fibrillation.  No changes with your current regimen at this time.  Would recommend discussing with wife and calling should you desire to proceed with an ablation.     F/u in one year.  Sooner if any issues arise.

## 2024-12-13 NOTE — PROGRESS NOTES
[facility]  [address] [place] [id number]    Electrophysiology Outpatient Follow-up Note    Subjective     Chief Complaint  Chief Complaint   Patient presents with    Atrial Fibrillation       HPI  Brando Sexton is a 64 y.o. male patient of Dr. Casillas and followed by heart failure team presents the clinic for routine follow-up for atrial fibrillation.    Patient was evaluated in clinic by Dr. Casillas 4/29/2024 to discuss further treatment options for longstanding persistent atrial fibrillation.  It was noted patient believed he was in atrial fibrillation for the past 7 to 10 years.  After further discussion of treatment options it was decided to proceed with a stress test to evaluate for left ventricular systolic dysfunction.    8/1/2024 nuc med Lexiscan showed normal myocardial perfusion imaging with no evidence of myocardial ischemia, global left ventricular systolic dysfunction, calculated EF 36%    Most recent echo 2/21/2024 showed an EF of 47% MIK 44ml/m2    3/28/24 48-hour Holter monitor shows an average heart rate of 73 bpm with a range of 34 bpm 156 bpm.  Patient remained in atrial fibrillation.    EKG today shows controlled atrial fibrillation heart rate 94 bpm with PVC    Patient presents to the clinic for atrial fibrillation reevaluation.  Patient reports overall he feels great.  Discussed stress test results including left ventricular dysfunction.  Discussed possible PVI redo that was originally discussed with Dr. Casillas in which patient at this time would like to decline.  Patient states he has a cardiologist friend in [place] who advised him to avoid an ablation.  Discussed risks that are involved with PVI ablation.  Patient also mentions he has sleep apnea and does not wear his mask.  He does have a mouthpiece that he reports helps significantly.  Takes Coumadin.  Past Medical History:   Diagnosis Date    Atrial fibrillation (CMS/HCC)     COVID-19  01/14/2022    Gout     Headache 10/17/2023    Injury of knee 10/17/2023    Pain in joint, shoulder region 01/25/2012    Note: Unchanged    Psoriasis     Sprain of rotator cuff capsule 07/12/2011    Note: Unchanged       Past Surgical History:   Procedure Laterality Date    CARDIOVERSION  03/2010    COLONOSCOPY  01/01/1995    ORTHOPEDIC SURGERY Bilateral     Knee Scope    OTHER SURGICAL HISTORY Right     Orthopedic:Rotator cuff x2 Right shoulder       No specialty comments available.    Allergies as of 12/13/2024 - Reviewed 12/13/2024   Allergen Reaction Noted    Allopurinol  01/18/2022    Cephalexin  01/18/2022    Nizoral [ketoconazole] Other (see comments) 02/23/2024       Current Outpatient Medications   Medication Sig Dispense Refill    spironolactone (ALDACTONE) 25 mg tablet Take 0.5 tablets (12.5 mg total) by mouth daily 45 tablet 0    febuxostat (ULORIC) 80 mg tablet Take 1 tablet (80 mg total) by mouth daily 90 tablet 3    losartan (Cozaar) 25 mg tablet Take 1 tablet (25 mg total) by mouth daily 90 tablet 3    carvediloL (COREG) 6.25 mg tablet Take 1 tablet (6.25 mg total) by mouth 2 (two) times a day Unknown mg 180 tablet 3    vitamin E,dl-alpha tocopherol, (VITAMIN E, BULK, MISC) 1 tablet daily      tacrolimus (PROTOPIC) 0.1 % ointment Apply thin layers over rash twice daily as needed. Safe to use on face. Safe to use daily. If this burns, mix with vaseline when you apply. 30 g 3    triamcinolone (KENALOG) 0.1 % cream Apply thin layer over affected areas twice daily for two weeks then twice weekly for maintenance. OK to increase use again for flares. Avoid face, armpits, and groin. 454 g 3    triamcinolone (KENALOG) 0.1 % ointment Apply thin layer over affected areas twice daily for two weeks then twice weekly for maintenance. OK to increase use again for flares. Avoid face, armpits and groin. 454 g 3    furosemide (LASIX) 20 mg tablet Take 1 tablet (20 mg total) by mouth daily 90 tablet 3    warfarin  (COUMADIN) 4 mg tablet Alternate 2mg with 4mg tablet every other day 45 tablet 3    warfarin (COUMADIN) 2 mg tablet Alternate 2mg with 4mg tablet every other day 45 tablet 3    ascorbic acid (VITAMIN C ORAL) Take by mouth       No current facility-administered medications for this visit.       Family History   Problem Relation Age of Onset    Heart disease Mother     Colon cancer Mother     Heart disease Father         MI at age 87    Other Father         Crohns    Psoriasis Father     Asthma Father     Diabetes type II Father's Sister     Heart disease Father's Brother        Social History     Tobacco Use    Smoking status: Former    Smokeless tobacco: Former   Vaping Use    Vaping status: Never Used   Substance Use Topics    Alcohol use: No    Drug use: Never       Review of Systems  Review of Systems   Cardiovascular:  Negative for chest pain, dyspnea on exertion, irregular heartbeat, leg swelling, near-syncope and palpitations.   Respiratory:  Negative for shortness of breath.    Hematologic/Lymphatic: Negative for bleeding problem. Bruises/bleeds easily.   Neurological:  Negative for dizziness and light-headedness.       Objective     Vitals:    12/13/24 1417   BP: 126/60   Pulse: 94   Resp: 18   SpO2: 95%     Weight: (!) 147.4 kg (325 lb)    Physical Exam  Vitals and nursing note reviewed.   Constitutional:       General: He is not in acute distress.     Appearance: Normal appearance. He is not ill-appearing, toxic-appearing or diaphoretic.   Cardiovascular:      Rate and Rhythm: Normal rate. Rhythm irregular.   Pulmonary:      Effort: Pulmonary effort is normal.      Breath sounds: Normal breath sounds. No wheezing or rhonchi.   Musculoskeletal:         General: Normal range of motion.      Right lower leg: No edema.      Left lower leg: No edema.   Skin:     General: Skin is warm and dry.   Neurological:      General: No focal deficit present.      Mental Status: He is alert and oriented to person, place,  and time.   Psychiatric:         Mood and Affect: Mood normal.         Behavior: Behavior normal.         Thought Content: Thought content normal.         Judgment: Judgment normal.         Data Review:   Anticoagulation Visit on 10/23/2024   Component Date Value    INR 10/23/2024 2.1 (H)      Lab Results   Component Value Date    TSH 1.878 03/19/2024       Assessment/Plan   Diagnoses and all orders for this visit:    Persistent atrial fibrillation (CMS/HCC)  -     ECG 12 lead -Normal, Today        Plan    Persistent atrial fibrillation  -EKG shows controlled atrial fibrillation  -Discussed stress test results with patient.  -[id number] at least 5  -On coumadin  -Discussed in depth possible PVI redo.  At this time patient would like to hold off on any plans for procedure.  States he would like to make it through winter and possibly consider an ablation in the spring around May.  Patient reports he will talk to his wife and call should he decide on proceeding with an ablation.  Should he have any further concerns regarding risk versus benefits, would recommend following up with Dr. Casillas for further discussion.        Patient Instructions   EKG shows controlled atrial fibrillation.  No changes with your current regimen at this time.  Would recommend discussing with wife and calling should you desire to proceed with an ablation.     F/u in one year.  Sooner if any issues arise.      Return in about 1 year (around 12/13/2025).    Patient Education: Ready to learn, no apparent learning barriers were identified; learning preference includes listening.  Explained diagnosis and treatment plan; patient expressed understanding of the content.    Jennifer G Franke, CNP          A voice recognition program was used to aid in documentation of this record.  Sometimes words are not printed exactly as they were spoken.  While efforts were made to carefully edit and correct any inaccuracies, some errors may be present; please  take these into context.  Please contact the provider if errors are identified.

## 2024-12-15 PROCEDURE — 93010 ELECTROCARDIOGRAM REPORT: CPT | Performed by: INTERNAL MEDICINE

## 2024-12-18 ENCOUNTER — OFFICE VISIT NO LOS (OUTPATIENT)
Dept: DIABETES SERVICES | Facility: CLINIC | Age: 64
End: 2024-12-18
Payer: MEDICARE

## 2024-12-18 VITALS — WEIGHT: 315 LBS | BODY MASS INDEX: 37.58 KG/M2

## 2024-12-18 DIAGNOSIS — E66.01 MORBID OBESITY (CMS/HCC): Primary | ICD-10-CM

## 2024-12-18 PROCEDURE — G0447 BEHAVIOR COUNSEL OBESITY 15M: HCPCS | Performed by: DIETITIAN, REGISTERED

## 2024-12-18 NOTE — PROGRESS NOTES
Intensive Behavior Therapy (IBT) for Obesity    Referring Provider: Dr. Gordon              Supervising Provider: Dr. OLINDA Greene    IBT Visit Number: 1  Date: 12/18/24  Beginning Time: 1406     End Time: 1449    BMI from initial provider referral: 40    Previous IBT Counseling: No    64 year old male with a BMI of 37.58  Current height: 78 inches  Current weight: 325.2# today    Patient reports usual body weight (UBW) of 320#  Patient has been weight stable x 2 years    Discussion:  Time spent face to face with patient:  43 minutes  Education provided: Individual  Patient attends today: unaccompanied    Assess:  Nutrition Assessment:  Occupation: retired,   Do you have any Restorationism / Cultural / Ethnic Food Practices? no  How do you learn best (Listening/Reading/Observing/Doing)? doing  Have you previously had any nutrition education? Many years ago    What previous weight loss plans have you tried? Nutrition program with [employer] eating 5x per day and [employer]    Alcohol Use: no  Smoking Status: quit over 40 years ago  Physical Activity: walk around property and hiking during hunting - 30 minutes per day    Problems with nausea, vomiting, constipation or diarrhea? IBS - controlled  Problems chewing, swallowing, feeding? no  Appetite is good/fair/poor: good  Food allergies/intolerances: none    Food Recall: 3 meals/day  Breakfast: coffee, banana, cereal sometimes eggs  AM Snack: no  Lunch: burrito but usually sandwich  PM Snack: not usual  Dinner: meat and vegetable and starch  HS Snack: no  Drinks: half strength coffee, flavored water, water  Times out to eat: 1x per week    Above Nutrition Assessment reviewed with patient: Yes    Jc attends initial IBT today. He reports that his previous attempts at weight loss were very successful as he recalls including more protein in his diet. His weight has been relatively stable. He feels that his hydration is very good. Thinking back on his usual  intake, he does agree that more protein may be beneficial.     Advise:  Required education provided on the risks of obesity and benefit of weight loss: Yes    Education today covered the importance of protein r/t weight loss. Initial protein recommendations based on BMI near 30 is to begin with goal of 100 gm per day.    Patient has a long-term weight loss goal to breathe easier, near 250#  Resources/Handouts provided: AND High Protein Food List    Agree:  Patient is ready for behavior change.  At this time, patient would like to work on dietary changes.     Goal(s):  Monitor protein intake and report at next visit.     Assist:  The following behavior change tool was used today:   Goal Setting    Arrange:  Follow up scheduled in 1 month

## 2024-12-19 DIAGNOSIS — I10 ESSENTIAL HYPERTENSION: ICD-10-CM

## 2024-12-19 DIAGNOSIS — I48.19 PERSISTENT ATRIAL FIBRILLATION (CMS/HCC): Chronic | ICD-10-CM

## 2024-12-19 DIAGNOSIS — I50.22 CHRONIC SYSTOLIC HEART FAILURE (CMS/HCC): ICD-10-CM

## 2024-12-19 RX ORDER — FUROSEMIDE 20 MG/1
20 TABLET ORAL DAILY
Qty: 90 TABLET | Refills: 0 | Status: SHIPPED | OUTPATIENT
Start: 2024-12-19

## 2024-12-19 RX ORDER — SPIRONOLACTONE 25 MG/1
12.5 TABLET ORAL DAILY
Qty: 45 TABLET | Refills: 3 | Status: SHIPPED | OUTPATIENT
Start: 2024-12-19 | End: 2025-12-19

## 2024-12-19 NOTE — TELEPHONE ENCOUNTER
Care Due:                  Date            Visit Type   Department     Provider  --------------------------------------------------------------------------------                              ESTABLISHED   [facility]  Last Visit: 11-      PATIENT      MEDICINE       ROXIE GUILLERMO  Next Visit: None Scheduled  None         None Found                                                            Last  Test          Frequency    Reason                     Performed    Due Date  --------------------------------------------------------------------------------  INR.........  2 months...  warfarin.................  10-   12-    Health [facility] Embedded Care Due Messages. Reference number: [id number]. 12/19/2024 3:01:47 PM MST

## 2024-12-20 RX ORDER — WARFARIN 4 MG/1
TABLET ORAL
Qty: 45 TABLET | Refills: 1 | Status: SHIPPED | OUTPATIENT
Start: 2024-12-20

## 2025-01-08 ENCOUNTER — ANTICOAGULATION VISIT (OUTPATIENT)
Dept: ANTICOAGULATION | Facility: CLINIC | Age: 65
End: 2025-01-08
Payer: MEDICARE

## 2025-01-08 VITALS — SYSTOLIC BLOOD PRESSURE: 114 MMHG | WEIGHT: 315 LBS | DIASTOLIC BLOOD PRESSURE: 59 MMHG | BODY MASS INDEX: 38.48 KG/M2

## 2025-01-08 DIAGNOSIS — I48.19 PERSISTENT ATRIAL FIBRILLATION (CMS/HCC): Primary | Chronic | ICD-10-CM

## 2025-01-08 LAB — INR PPP: 2 (ref 0.9–1.1)

## 2025-01-08 PROCEDURE — G0463 HOSPITAL OUTPT CLINIC VISIT: HCPCS

## 2025-01-08 PROCEDURE — 85610 PROTHROMBIN TIME: CPT | Mod: QW

## 2025-01-08 NOTE — PROGRESS NOTES
Subjective   Brando Sexton presents to the anticoagulation clinic for monitoring of his long term warfarin therapy. Jc confirmed warfarin 2 mg on Sun/Tues/Thurs and 4 mg all other days of the week. He has both 2 mg and 4 mg tablets.     Today Brando reports the following:   Bleeding signs/symptoms: No   Major bleeding event: No  Thrombosis signs/symptoms: No  Thromboembolic event: No  Missed doses: No  Medication changes: No  Dietary changes: No  Bacterial/viral infection: no  Other concerns: No    Current Outpatient Medications   Medication Sig Dispense Refill    warfarin (COUMADIN) 4 mg tablet ALTERNATE 2 MG WITH 4 MG TABLET BY MOUTH EVERY OTHER DAY 45 tablet 1    spironolactone (ALDACTONE) 25 mg tablet Take 0.5 tablets (12.5 mg total) by mouth daily 45 tablet 3    furosemide (LASIX) 20 mg tablet Take 1 tablet (20 mg total) by mouth daily 90 tablet 0    febuxostat (ULORIC) 80 mg tablet Take 1 tablet (80 mg total) by mouth daily 90 tablet 3    losartan (Cozaar) 25 mg tablet Take 1 tablet (25 mg total) by mouth daily 90 tablet 3    carvediloL (COREG) 6.25 mg tablet Take 1 tablet (6.25 mg total) by mouth 2 (two) times a day Unknown mg 180 tablet 3    vitamin E,dl-alpha tocopherol, (VITAMIN E, BULK, MISC) 1 tablet daily      tacrolimus (PROTOPIC) 0.1 % ointment Apply thin layers over rash twice daily as needed. Safe to use on face. Safe to use daily. If this burns, mix with vaseline when you apply. 30 g 3    triamcinolone (KENALOG) 0.1 % cream Apply thin layer over affected areas twice daily for two weeks then twice weekly for maintenance. OK to increase use again for flares. Avoid face, armpits, and groin. 454 g 3    triamcinolone (KENALOG) 0.1 % ointment Apply thin layer over affected areas twice daily for two weeks then twice weekly for maintenance. OK to increase use again for flares. Avoid face, armpits and groin. 454 g 3    warfarin (COUMADIN) 2 mg tablet Alternate 2mg with 4mg tablet every other day  45 tablet 3    ascorbic acid (VITAMIN C ORAL) Take by mouth       No current facility-administered medications for this visit.         Objective     /59 (BP Location: Left arm, Patient Position: Sitting, Cuff Size: Large Long Adult)   Wt (!) 151 kg (333 lb) Comment: multiple layers  BMI 38.48 kg/m²     Assessment/Plan      Anticoagulation Summary  As of 2025      INR goal:  2.0-3.0   INR used for dosin.0 (2025)   Full warfarin instructions:  2 mg every Sun, Tue, Thu; 4 mg all other days   Next INR check:  2025   Priority:  Maintenance    Indications    Persistent atrial fibrillation (CMS/HCC) [I48.19]                 Anticoagulation Care Providers       Provider Role Specialty Phone number    Avery Gordon MD Referring Family Medicine [phone]            Anticoagulation therapy status: INR is stable, no dose adjustment required. Continue warfarin 2 mg on Sun/Tues/Thurs and 4 mg all other days of the week. Follow up INR in 6 weeks - he plans to call to schedule at a later date. Patient verbalized understanding regarding dose, monitoring and INR.      Please reference Anticoagulation episode for additional documentation.

## 2025-01-29 ENCOUNTER — OFFICE VISIT NO LOS (OUTPATIENT)
Dept: DIABETES SERVICES | Facility: CLINIC | Age: 65
End: 2025-01-29
Payer: MEDICARE

## 2025-01-29 VITALS — BODY MASS INDEX: 37.07 KG/M2 | WEIGHT: 315 LBS

## 2025-01-29 DIAGNOSIS — E66.01 MORBID OBESITY (CMS/HCC): Primary | ICD-10-CM

## 2025-01-29 PROCEDURE — G0447 BEHAVIOR COUNSEL OBESITY 15M: HCPCS | Performed by: DIETITIAN, REGISTERED

## 2025-01-29 NOTE — PROGRESS NOTES
Intensive Behavior Therapy (IBT) for Obesity    Referring Provider: Dr. Gordon              Supervising Provider: Dr. Adams    IBT Visit Number:   Date: 1/29/25 (6-month visit near 6/18/25)  Beginning Time: 1347     End Time: 1422    BMI from initial provider referral: 40    64 year old male with a BMI of 37.07  Current height: 78 inches  Current weight: 320.8# today, 325.2# 12/18/24    Weight loss of 4.4# x 5 weeks.     Discussion:  Time spent face to face with patient:  35 minutes  Education provided: Individual  Patient attends today: unaccompanied    Assess:  Jc attends IBT f/u today. He did keep a food log to track protein intake. He added in [employer] chips - about 15-18 gm protein - on some days. Jc has also cut out ice cream as a snack. He does not necessarily feel full after meals. On a positive note along with weight loss, he feels as though his sleep has improved.     Advise:  Education today covered vegetable intake and using fiber to help feel more full at meals.     Patient has a long-term weight loss goal to breathe easier, near 250#  Resources/Handouts provided: none    Agree:  Patient is ready for behavior change.  At this time, patient would like to work on dietary changes.     Goal(s):  Increase vegetables to 1 cup at evening meal.     Assist:  The following behavior change tool was used today:   Knowledge    Arrange:  Follow up scheduled in 1 month.

## 2025-02-25 ENCOUNTER — OFFICE VISIT NO LOS (OUTPATIENT)
Dept: DIABETES SERVICES | Facility: CLINIC | Age: 65
End: 2025-02-25
Payer: MEDICARE

## 2025-02-25 VITALS — WEIGHT: 314 LBS | BODY MASS INDEX: 36.29 KG/M2

## 2025-02-25 DIAGNOSIS — E66.01 MORBID OBESITY (CMS/HCC): Primary | ICD-10-CM

## 2025-02-25 PROCEDURE — G0447 BEHAVIOR COUNSEL OBESITY 15M: HCPCS | Performed by: DIETITIAN, REGISTERED

## 2025-02-25 NOTE — PROGRESS NOTES
Intensive Behavior Therapy (IBT) for Obesity    Referring Provider: Dr. Gordon              Supervising Provider: Dr. Vu    IBT Visit Number:   Date: 2/25/25 (6-month visit near 6/18/25)  Beginning Time: 1246     End Time: 1309    BMI from initial provider referral: 40    64 year old male with a BMI of 36.29  Current height: 78 inches  Current weight: 314# today, 320.8# 1/29/25, 325.2# 12/18/24    Weight loss of 6.8# x 1 month.     Discussion:  Time spent face to face with patient:  23 minutes  Education provided: Individual  Patient attends today: unaccompanied    Assess:  Jc attends IBT f/u today. He continues to do well with weight loss. With regard to his previous goal, he has been including more vegetables in his diet - coleslaw, mixed vegetables, corn and green beans. His wife often uses the steamer bags for ease. He continues with the [employer] chips and also having less ice cream.     Advise:  Education today importance of exercise and building muscle. Jc will monitor his knee pain and perform exercise as he is able.     Patient has a long-term weight loss goal to breathe easier, near 250#  Resources/Handouts provided: none    Agree:  Patient is ready for behavior change.  At this time, patient would like to work on being active.     Goal(s):  Check into [facility] membership prior to next visit.     Assist:  The following behavior change tool was used today:   Knowledge and Monitoring by Patient    Arrange:  Follow up scheduled in 1 month.

## 2025-02-27 ENCOUNTER — TELEPHONE (OUTPATIENT)
Dept: CARDIOLOGY | Facility: CLINIC | Age: 65
End: 2025-02-27
Payer: MEDICARE

## 2025-02-27 DIAGNOSIS — I50.22 CHRONIC SYSTOLIC HEART FAILURE (CMS/HCC): ICD-10-CM

## 2025-02-27 RX ORDER — SACUBITRIL AND VALSARTAN 24; 26 MG/1; MG/1
1 TABLET, FILM COATED ORAL 2 TIMES DAILY
Qty: 60 TABLET | Refills: 0 | OUTPATIENT
Start: 2025-02-27

## 2025-02-27 NOTE — TELEPHONE ENCOUNTER
Pt called because he was instructed by his Orthopedic doctor to consult his cardiologist for leg swelling since he is on Spironolactone and Lasix.    Pt received injections to his L knee yesterday and has had some swelling to the LLE. Pt does track his weight, currently working with a dietician and has lost 12 pounds in the past 2 months. He has not noticed any increase in weight between yesterday and today, denies any SOB or increased swelling to BLE. Pt states the LLE is actually less swollen than previous. Instructed pt to continue to ambulate, elevate when seated/in bed, wear compression stockings when up and moving, and continue to monitor weight; notify clinic if greater than 3lbs overnight or 5 lbs in one week. Also advised pt to monitor for increased swelling, redness, and pain to that Left leg in concern for a blood clot;  pt verbalized understanding.    Will forward to CHF for review if any recommendations and will notify scheduling for routine f/u 1 year appt need.

## 2025-03-03 ENCOUNTER — TELEPHONE (OUTPATIENT)
Dept: CARDIOLOGY | Facility: CLINIC | Age: 65
End: 2025-03-03
Payer: MEDICARE

## 2025-03-26 NOTE — PROGRESS NOTES
[facility]     CARDIOLOGY HEART FAILURE OUTPATIENT PROGRESS NOTE       Subjective       Patient ID: Brando Sexton is a 65 y.o. patient of Dr. Walsh, Dr. Casillas who presents to the heart failure clinic for follow-up visit.     MARIA A Greene has a history of chronic systolic heart failure, persistent atrial fibrillation s/p unsuccessful cardioversion anticoagulated on warfarin, hypertension, TIA, MOE.    Jc was last seen in the heart failure clinic on 3/19/2024 with Dr. Walsh. His carvedilol was increased, he was transitioned from lisinopril to entresto, and spironolactone was started. He was set up to have 48 hour holter monitor that revealed atrial fibrillation with average heart rate of 73 with range of  bpm, home sleep study that revealed sleep apnea, and NM walking lexiscan stress test that did not show any concerns for ischemia.    He saw electrophysiology on 12/13/2024 for management of his atrial fibrillation.  He was asymptomatic at that time.  He declined redo PVI ablation.  No changes to his current regimen.    Jc was seen in the heart failure clinic today.  He is doing relatively well. Denies any dyspnea, orthopnea, and abdominal bloating. He has some lower extremity edema but reports minimal. He thinks his left left leg is worse than his right. He endorses knee injuries/surgery in the past that has caused some residual pain and edema. His weight is down roughly 12 lbs as he is actively trying to lose weight and is working with a nutritionist. Patient reports diet low in sodium. He does drink quite a bit of fluids but is having trouble quantifying exactly how much he drinks. He has a water bottle that is 25 ounces, and he drinks 2 of those. He also drinks coffee, Gatorade, and protein drinks.  Denies any chest discomfort, endorses occasional palpitation.  Taking medications appropriately with no side effects.  He takes his medications around noon  as he doesn't want to urinate in the morning. He urinates well from the diuretic from 1-5 pm, then it tapers off. Denies any dizziness, syncope, or falls. Energy levels good, denies fatigue. Able to complete activities of daily living with no limitations. Reports ability to walk freely on level ground without limitations. He is exercising on a mechanical bike.    Past Medical History:   Diagnosis Date    Atrial fibrillation (CMS/HCC)     COVID-19 01/14/2022    Gout     Headache 10/17/2023    Injury of knee 10/17/2023    Pain in joint, shoulder region 01/25/2012    Note: Unchanged    Psoriasis     Sprain of rotator cuff capsule 07/12/2011    Note: Unchanged       Past Surgical History:   Procedure Laterality Date    CARDIOVERSION  03/2010    COLONOSCOPY  01/01/1995    ORTHOPEDIC SURGERY Bilateral     Knee Scope    OTHER SURGICAL HISTORY Right     Orthopedic:Rotator cuff x2 Right shoulder       Allergies   Allergen Reactions    Allopurinol     Cephalexin     Nizoral [Ketoconazole] Other (see comments)     Stinging and burning         Current Outpatient Medications:     warfarin (COUMADIN) 4 mg tablet, ALTERNATE 2 MG WITH 4 MG TABLET BY MOUTH EVERY OTHER DAY, Disp: 45 tablet, Rfl: 1    spironolactone (ALDACTONE) 25 mg tablet, Take 0.5 tablets (12.5 mg total) by mouth daily, Disp: 45 tablet, Rfl: 3    furosemide (LASIX) 20 mg tablet, Take 1 tablet (20 mg total) by mouth daily, Disp: 90 tablet, Rfl: 0    febuxostat (ULORIC) 80 mg tablet, Take 1 tablet (80 mg total) by mouth daily, Disp: 90 tablet, Rfl: 3    losartan (Cozaar) 25 mg tablet, Take 1 tablet (25 mg total) by mouth daily, Disp: 90 tablet, Rfl: 3    carvediloL (COREG) 6.25 mg tablet, Take 1 tablet (6.25 mg total) by mouth 2 (two) times a day Unknown mg, Disp: 180 tablet, Rfl: 3    vitamin E,dl-alpha tocopherol, (VITAMIN E, BULK, MISC), 1 tablet daily, Disp: , Rfl:     tacrolimus (PROTOPIC) 0.1 % ointment, Apply thin layers over rash twice daily as needed. Safe  to use on face. Safe to use daily. If this burns, mix with vaseline when you apply., Disp: 30 g, Rfl: 3    triamcinolone (KENALOG) 0.1 % cream, Apply thin layer over affected areas twice daily for two weeks then twice weekly for maintenance. OK to increase use again for flares. Avoid face, armpits, and groin., Disp: 454 g, Rfl: 3    triamcinolone (KENALOG) 0.1 % ointment, Apply thin layer over affected areas twice daily for two weeks then twice weekly for maintenance. OK to increase use again for flares. Avoid face, armpits and groin., Disp: 454 g, Rfl: 3    ascorbic acid (VITAMIN C ORAL), Take by mouth, Disp: , Rfl:     warfarin (COUMADIN) 2 mg tablet, Alternate 2mg with 4mg tablet every other day, Disp: 45 tablet, Rfl: 3    Family History   Problem Relation Age of Onset    Heart disease Mother     Colon cancer Mother     Heart disease Father         MI at age 87    Other Father         Crohns    Psoriasis Father     Asthma Father     Diabetes type II Father's Sister     Heart disease Father's Brother        Social History     Socioeconomic History    Marital status:      Spouse name: Not on file    Number of children: Not on file    Years of education: Not on file    Highest education level: Not on file   Occupational History    Not on file   Tobacco Use    Smoking status: Former    Smokeless tobacco: Former   Vaping Use    Vaping status: Never Used   Substance and Sexual Activity    Alcohol use: No    Drug use: Never    Sexual activity: Defer   Other Topics Concern    Not on file   Social History Narrative    Not on file     Social Drivers of Health     Financial Resource Strain: Patient Declined (12/19/2023)    Overall Financial Resource Strain (CARDIA)     Difficulty of Paying Living Expenses: Patient declined   Food Insecurity: Patient Declined (12/19/2023)    Hunger Vital Sign     Worried About Running Out of Food in the Last Year: Patient declined     Ran Out of Food in the Last Year: Patient declined    Transportation Needs: No Transportation Needs (12/19/2023)    PRAPARE - Transportation     Lack of Transportation (Medical): No     Lack of Transportation (Non-Medical): No   Physical Activity: Insufficiently Active (12/19/2023)    Exercise Vital Sign     Days of Exercise per Week: 1 day     Minutes of Exercise per Session: 60 min   Stress: No Stress Concern Present (12/19/2023)    [employer] - Occupational Stress Questionnaire     Feeling of Stress : Not at all   Social Connections: Unknown (12/19/2023)    Social Connection and Isolation Panel [NHANES]     Frequency of Communication with Friends and Family: Patient declined     Frequency of Social Gatherings with Friends and Family: Patient declined     Attends Sikh Services: Patient declined     Active Member of Clubs or Organizations: Patient declined     Attends Club or Organization Meetings: Never     Marital Status:    Intimate Partner Violence: Unknown (12/19/2023)    Humiliation, Afraid, Rape, and Kick questionnaire     Fear of Current or Ex-Partner: Patient declined     Emotionally Abused: Patient declined     Physically Abused: Not on file     Sexually Abused: No   Housing Stability: Unknown (12/19/2023)    Housing Stability Vital Sign     Unable to Pay for Housing in the Last Year: Patient refused     Number of Places Lived in the Last Year: Not on file     Unstable Housing in the Last Year: Patient refused       Review of Systems   Constitutional: Positive for weight loss. Negative for fever, malaise/fatigue and weight gain.   Eyes:  Negative for blurred vision.   Cardiovascular:  Positive for leg swelling. Negative for chest pain, dyspnea on exertion, orthopnea, palpitations, paroxysmal nocturnal dyspnea and syncope.   Respiratory:  Negative for cough, hemoptysis, shortness of breath, sleep disturbances due to breathing and wheezing.    Hematologic/Lymphatic: Does not bruise/bleed easily.   Skin:  Positive for  "color change. Negative for poor wound healing.   Musculoskeletal:  Positive for back pain and joint pain. Negative for falls.   Gastrointestinal:  Negative for bloating.   Neurological:  Negative for dizziness, light-headedness and weakness.       Objective     Vitals:    03/31/25 0812   BP: 122/64   Pulse: 73   Resp: 18   SpO2: 95%   Height: 1.981 m (6' 6\")   Weight: (!) 144.6 kg (318 lb 12.8 oz)   PainSc: 0-No pain   Pain Education: No   Patient Position: Sitting       Sodium   Date Value Ref Range Status   08/16/2024 140 135 - 145 mmol/L Final     Potassium   Date Value Ref Range Status   08/16/2024 4.2 3.5 - 5.1 MMOL/L Final     Chloride   Date Value Ref Range Status   08/16/2024 109 (H) 98 - 107 mmol/L Final     CO2   Date Value Ref Range Status   08/16/2024 25 21 - 32 mmol/L Final     BUN   Date Value Ref Range Status   08/16/2024 31 (H) 7 - 25 mg/dL Final     Creatinine   Date Value Ref Range Status   08/16/2024 1.49 (H) 0.70 - 1.30 mg/dL Final     Glucose   Date Value Ref Range Status   08/16/2024 106 (H) 70 - 105 mg/dL Final     Calcium   Date Value Ref Range Status   08/16/2024 8.9 8.6 - 10.3 mg/dL Final       Physical Exam  Vitals reviewed.   Constitutional:       General: He is not in acute distress.     Appearance: He is obese. He is not diaphoretic.   Cardiovascular:      Rate and Rhythm: Normal rate and regular rhythm.      Pulses: Normal pulses.      Heart sounds: No murmur heard.  Pulmonary:      Effort: Pulmonary effort is normal. No respiratory distress.      Breath sounds: Normal breath sounds. No wheezing or rales.   Abdominal:      General: There is no distension.   Musculoskeletal:         General: Tenderness present. No swelling.      Right knee: Tenderness present.      Left knee: Tenderness present.      Right lower leg: Edema present.      Left lower leg: Edema present.      Comments: Right leg trace edema, left leg 1+ edema   Skin:     General: Skin is warm and dry.      Comments: Venous " insufficiency to bilateral LE   Neurological:      Mental Status: He is alert and oriented to person, place, and time.                            Assessment/Plan     Jc was seen today for systolic hf.    Diagnoses and all orders for this visit:    Chronic systolic heart failure (CMS/HCC)  -     Pro B-Type Natriuretic Peptide Blood, Venous; Future  -     Basic metabolic panel Blood, Venous; Future  -     Thyroid Stimulating Hormone, Ultrasensitive Blood, Venous; Future  -     Cancel: US Echo complete; Future  -     US Echo complete; Future    Persistent atrial fibrillation (CMS/HCC)        Heart failure with mild reduced ejection fraction  NYHA Class: I-II  Heart failure stage: C   Echocardiogram on 2/21/2024 showed EF 47%, RV was moderately dilated with normal systolic function.  Mild MR, moderate TR.  Mild to moderate pulmonary hypertension  Echocardiogram ordered to be completed in [place] next available  NM Lexiscan stress test in 8/2024 was negative for ischemia  48 hour holter monitor that revealed atrial fibrillation with average heart rate of 73 with range of  bpm  History of persistent atrial fibrillation anticoagulated on warfarin.   Device: none. QRS duration: 97 ms.  No indication for ICD/CRT at this time.  GDMT  ACEI/ARB/ARNI: Losartan 25 mg daily.  Entresto was cost prohibited  Beta-Blocker: Carvedilol 6.25 mg twice daily   Aldosterone antagonist: Spironolactone 12.5 mg daily  SGLT2 inhibitor: none. Jardiance was cost prohibited   Volume status today: slightly overloaded on exam consistent with LE edema  Diuretic strategy: lasix 20 mg daily   TSH, BMP, ProBNP ordered   Advise routine cardiac exercise  MOE not on CPAP, wants to try weight loss first   Advise daily weights.  Contact our clinic with a 3 pound weight gain overnight or 5 pounds in a week  Follow a low-sodium (2000mg), heart healthy diet and 2 L fluid restriction  Advise compression stockings daily and elevation of legs several  times daily  Follow-up in the heart failure clinic in 6 months  Advise patient contact the clinic with increasing symptoms or concerns    Heart Failure Education:  Estimated time spent: 30 Minutes  Topics discussed: Causes of heart failure, common symptoms of heart failure, fluid management, testing/lab monitoring, medications, lifestyle management, and follow-up appointments.  Patient was receptive to education and was able to verbally repeat information back to provider. All questions answered.      Return in about 6 months (around 9/30/2025).            A voice recognition program was used to aid in documentation of this record. Sometimes words are not printed exactly as they were spoken.  While efforts were made to carefully edit and correct any inaccuracies, some errors may be present; please take these into context.  Please contact the provider if errors are identified.

## 2025-03-31 ENCOUNTER — RESULTS FOLLOW-UP (OUTPATIENT)
Dept: CARDIOLOGY | Facility: CLINIC | Age: 65
End: 2025-03-31

## 2025-03-31 ENCOUNTER — OFFICE VISIT (OUTPATIENT)
Dept: CARDIOLOGY | Facility: CLINIC | Age: 65
End: 2025-03-31
Payer: MEDICARE

## 2025-03-31 ENCOUNTER — LAB (OUTPATIENT)
Dept: LAB | Facility: CLINIC | Age: 65
End: 2025-03-31
Payer: MEDICARE

## 2025-03-31 VITALS
DIASTOLIC BLOOD PRESSURE: 64 MMHG | HEART RATE: 73 BPM | RESPIRATION RATE: 18 BRPM | WEIGHT: 315 LBS | OXYGEN SATURATION: 95 % | HEIGHT: 78 IN | BODY MASS INDEX: 36.45 KG/M2 | SYSTOLIC BLOOD PRESSURE: 122 MMHG

## 2025-03-31 DIAGNOSIS — I48.19 PERSISTENT ATRIAL FIBRILLATION (CMS/HCC): Chronic | ICD-10-CM

## 2025-03-31 DIAGNOSIS — I50.22 CHRONIC SYSTOLIC HEART FAILURE (CMS/HCC): Primary | ICD-10-CM

## 2025-03-31 DIAGNOSIS — I50.22 CHRONIC SYSTOLIC HEART FAILURE (CMS/HCC): ICD-10-CM

## 2025-03-31 LAB
ANION GAP SERPL CALC-SCNC: 7 MMOL/L (ref 3–11)
BUN SERPL-MCNC: 39 MG/DL (ref 7–25)
CALCIUM SERPL-MCNC: 8.8 MG/DL (ref 8.6–10.3)
CHLORIDE SERPL-SCNC: 109 MMOL/L (ref 98–107)
CO2 SERPL-SCNC: 23 MMOL/L (ref 21–32)
CREAT SERPL-MCNC: 1.33 MG/DL (ref 0.7–1.3)
EGFRCR SERPLBLD CKD-EPI 2021: 59 ML/MIN/1.73M*2
GLUCOSE SERPL-MCNC: 97 MG/DL (ref 70–105)
NT-PROBNP SERPL-MCNC: 1222 NG/L
POTASSIUM SERPL-SCNC: 4.5 MMOL/L (ref 3.5–5.1)
SODIUM SERPL-SCNC: 139 MMOL/L (ref 135–145)
TSH SERPL DL<=0.05 MIU/L-ACNC: 3.2 UIU/ML (ref 0.34–4.82)

## 2025-03-31 PROCEDURE — G2211 COMPLEX E/M VISIT ADD ON: HCPCS

## 2025-03-31 PROCEDURE — 84443 ASSAY THYROID STIM HORMONE: CPT

## 2025-03-31 PROCEDURE — 80048 BASIC METABOLIC PNL TOTAL CA: CPT

## 2025-03-31 PROCEDURE — 83880 ASSAY OF NATRIURETIC PEPTIDE: CPT

## 2025-03-31 PROCEDURE — 99213 OFFICE O/P EST LOW 20 MIN: CPT

## 2025-03-31 PROCEDURE — G0463 HOSPITAL OUTPT CLINIC VISIT: HCPCS

## 2025-03-31 PROCEDURE — 36415 COLL VENOUS BLD VENIPUNCTURE: CPT

## 2025-03-31 ASSESSMENT — ENCOUNTER SYMPTOMS
BACK PAIN: 1
SLEEP DISTURBANCES DUE TO BREATHING: 0
SHORTNESS OF BREATH: 0
FALLS: 0
BRUISES/BLEEDS EASILY: 0
BLURRED VISION: 0
WEAKNESS: 0
ORTHOPNEA: 0
POOR WOUND HEALING: 0
LIGHT-HEADEDNESS: 0
WHEEZING: 0
PALPITATIONS: 0
COUGH: 0
BLOATING: 0
WEIGHT GAIN: 0
WEIGHT LOSS: 1
SYNCOPE: 0
DIZZINESS: 0
HEMOPTYSIS: 0
PND: 0
DYSPNEA ON EXERTION: 0
COLOR CHANGE: 1
FEVER: 0

## 2025-03-31 ASSESSMENT — PAIN SCALES - GENERAL: PAINLEVEL_OUTOF10: 0-NO PAIN

## 2025-03-31 NOTE — PATIENT INSTRUCTIONS
Keep fluid intake around 8-10 cups.  Consider compression stockings  Lab today. Will call with results  Will schedule echocardiogram for next available appt today.

## 2025-04-01 ENCOUNTER — OFFICE VISIT NO LOS (OUTPATIENT)
Dept: DIABETES SERVICES | Facility: CLINIC | Age: 65
End: 2025-04-01
Payer: MEDICARE

## 2025-04-01 VITALS — WEIGHT: 315 LBS | BODY MASS INDEX: 37.26 KG/M2

## 2025-04-01 DIAGNOSIS — E66.01 MORBID OBESITY (CMS/HCC): Primary | ICD-10-CM

## 2025-04-01 PROCEDURE — G0447 BEHAVIOR COUNSEL OBESITY 15M: HCPCS | Performed by: DIETITIAN, REGISTERED

## 2025-04-01 NOTE — PROGRESS NOTES
Intensive Behavior Therapy (IBT) for Obesity    Referring Provider: Dr. Gordon              Supervising Provider: Dr. Greene    IBT Visit Number: 4  Date: 4/1/25 (6-month visit near 6/18/25)  Beginning Time: 0912     End Time: 0945    BMI from initial provider referral: 40    65 year old male with a BMI of 37.26  Current height: 78 inches  Current weight: 322.4# today, 314# 2/25/25, 320.8# 1/29/25, 325.2# 12/18/24    Weight gain of 8.4# x 1 month.     Discussion:  Time spent face to face with patient:  33 minutes  Education provided: Individual  Patient attends today: unaccompanied    Assess:  Jc attends IBT f/u today. He attended cardiology f/u yesterday where he reported increased swelling in his left leg. Per notes, he is to follow a 2000mg low sodium diet with a 2L fluid restriction. He is taking spironolactone and lasix. He has new exercise equipment at home - recumbent bike. He does not currently have an exercise restriction. His weight is up quite markedly today. His diet was reviewed from yesterday which was higher in salt r/t eating hot dogs. Heart healthy recommendations reviewed from cardiology and he was encouraged to take his weights as per their instructions.     Advise:  Education today reviewed recommendations from cardiology and encouraged regular exercise to help with fluid status. Fluid restriction also discussed r/t normal intake.     Patient has a long-term weight loss goal to breathe easier, near 250#  Resources/Handouts provided: none    Agree:  Patient is ready for behavior change.  At this time, patient would like to work on being active.     Goal(s):  Per instructions of cardiology check and monitor daily weights r/t fluid status.   Use exercise bike for 10 to 15 minutes every other day.     Assist:  The following behavior change tool was used today:   Knowledge, Monitoring by Patient, and Goal Setting    Arrange:  Follow up scheduled in 1 month.

## 2025-04-03 DIAGNOSIS — R60.9 EDEMA, UNSPECIFIED TYPE: ICD-10-CM

## 2025-04-03 DIAGNOSIS — I50.22 CHRONIC SYSTOLIC HEART FAILURE (CMS/HCC): Primary | ICD-10-CM

## 2025-04-07 ENCOUNTER — ANCILLARY PROCEDURE (OUTPATIENT)
Dept: CARDIOLOGY | Facility: CLINIC | Age: 65
End: 2025-04-07
Payer: MEDICARE

## 2025-04-07 DIAGNOSIS — I50.22 CHRONIC SYSTOLIC HEART FAILURE (CMS/HCC): ICD-10-CM

## 2025-04-07 LAB
ASCENDING AORTA: 4 CM
AV LVOT PEAK GRADIENT: 3.92 MMHG
AV MEAN GRADIENT: 9.05 MMHG
AV PEAK GRADIENT: 14.9 MMHG
DOP CALC AO PEAK VEL: 1.93 M/S
DOP CALC AO VTI: 36.1 CM
DOP CALC LVOT AREA: 4.34 CM2
DOP CALC LVOT DIAMETER: 2.35 CM
DOP CALC LVOT PEAK VEL: 99 CM/S
DOP CALC LVOT STROKE VOLUME: 79 CM3
DOP CALC MV VTI: 20.43 CM
DOP CALC RVOT PEAK VEL: 0.7 M/S
DOP CALCLVOT PEAK VEL VTI: 18.3 CM
E/E' RATIO (AVERAGE): 12.7
E/E' RATIO: 17.9
ECHO EF ESTIMATED: 52 %
EJECTION FRACTION: 52 %
ERAP: 5 MMHG
INTERVENTRICULAR SEPTUM: 1.2 CM (ref 0.6–1.1)
IVC PROX: 1.67 CM
LA AREA A4C SYSTOLE: 155 CM3
LEFT ATRIUM SIZE: 4.9 CM
LEFT ATRIUM VOLUME INDEX: 57 ML/M2
LEFT ATRIUM VOLUME: 156 CM3
LEFT INTERNAL DIMENSION IN SYSTOLE: 3.3 CM (ref 2.1–4)
LEFT VENTRICLE DIASTOLIC VOLUME: 199 CM3
LEFT VENTRICLE SYSTOLIC VOLUME: 96 CM3
LEFT VENTRICULAR INTERNAL DIMENSION IN DIASTOLE: 4.5 CM (ref 3.5–6)
LVAD-AP2: 42.5 CM2
LVAD-AP4: 42.5 CM2
LVAD-AP4: 45.8 CM2
LVAD-AP4: 45.8 CM2
MR VTI: 143 CM
MV DEC SLOPE: 6230 MM/S2
MV DT: 152 MS
MV E/E' LATERAL: 7.5
MV MEAN GRADIENT: 2.15 MMHG
MV PEAK E VEL: 94.9 CM/S
MV PEAK GRADIENT: 4 MMHG
MV STENOSIS PRESSURE HALF TIME: 0.05 S
MV VALVE AREA P 1/2 METHOD: 4.31 CM2
MV VMAX: 105 CM/S
MVA (VTI): 3.89 CM2
PISA MRMAX VEL: 474 CM/S
POSTERIOR WALL: 1.2 CM (ref 0.6–1.1)
PULM VEIN S/D RATIO: 0.7
PV PEAK D VEL: 61 CM/S
PV PEAK GRADIENT: 5.95 MMHG
PV PEAK S VEL: 44 CM/S
PV VMAX: 1.22 M/S
RA AREA: 29.1 CM2
RH CV ECHO AV VALVE AREA VEL: 2.2 CM2
RH CV ECHO AV VALVE AREA VTI: 2.2 CM2
RH LVOT PEAK VELOCITY REST: 1 M/S
RIGHT VENTRICULAR INTERNAL DIMENSION IN DIASTOLE: 3.6 CM
RV AP4 BASE: 5.5 CM
S': 14.5 CM/S
TDI: 5.3 CM/S
TDILATERAL: 12.6 CM/S
TR MAX PG: 30 MMHG
TRICUSPID ANNULAR PLANE SYSTOLIC EXCURSION: 2 CM
TRICUSPID VALVE PEAK REGURGITATION VELOCITY: 2.7 M/S
TV REST PULMONARY ARTERY PRESSURE: 35 MMHG

## 2025-04-07 PROCEDURE — 93306 TTE W/DOPPLER COMPLETE: CPT | Mod: 26 | Performed by: INTERNAL MEDICINE

## 2025-04-07 PROCEDURE — 93306 TTE W/DOPPLER COMPLETE: CPT

## 2025-04-08 ENCOUNTER — TELEPHONE (OUTPATIENT)
Dept: DIABETES SERVICES | Facility: CLINIC | Age: 65
End: 2025-04-08
Payer: MEDICARE

## 2025-04-08 NOTE — TELEPHONE ENCOUNTER
"Patient called for dietitian who is out of office today. States he is checking weights daily. Reports following weights:    Saturday 4/5/25 - 319 #  Sunday 4/6/25 - 310#  Monday 4/7/25 - 317# - weight early morning with clothing on sans shoes.     * reweigh 2-3 hours later after eating - 310# - undressed sans shoes    Reports he had 2 \"tiny\" pieces of beef jerky last evening but doesn't feel that would have caused weight to go up. States he thinks he is supposed to call cardiology if weight is up 2 lbs but with second reweigh showing same as Sunday he is not sure what to do and wanted to visit with RD. Patient confirms he is using the same scale each time. Reminded to weigh same time of day, undressed or with similar clothing. Patient unsure how much his clothing weighed this morning. Informed patient he could gather that clothing and weigh it on his scale. Encouraged patient to call cardiology and discuss with staff there. Reports he would like to visit with RD in the morning (4/8/25) and if he hasn't heard from her by noon he will call cardiology.   "

## 2025-04-16 ENCOUNTER — ANTICOAGULATION VISIT (OUTPATIENT)
Dept: ANTICOAGULATION | Facility: CLINIC | Age: 65
End: 2025-04-16
Payer: MEDICARE

## 2025-04-16 ENCOUNTER — LAB (OUTPATIENT)
Dept: LAB | Facility: CLINIC | Age: 65
End: 2025-04-16
Payer: MEDICARE

## 2025-04-16 ENCOUNTER — RESULTS FOLLOW-UP (OUTPATIENT)
Dept: PHARMACY | Facility: HOSPITAL | Age: 65
End: 2025-04-16

## 2025-04-16 DIAGNOSIS — I48.19 PERSISTENT ATRIAL FIBRILLATION (CMS/HCC): ICD-10-CM

## 2025-04-16 DIAGNOSIS — I48.19 PERSISTENT ATRIAL FIBRILLATION (CMS/HCC): Primary | Chronic | ICD-10-CM

## 2025-04-16 DIAGNOSIS — I48.19 PERSISTENT ATRIAL FIBRILLATION (CMS/HCC): Primary | ICD-10-CM

## 2025-04-16 LAB — INR PPP: 1.8 (ref 0.9–1.1)

## 2025-04-16 PROCEDURE — 85610 PROTHROMBIN TIME: CPT | Mod: QW

## 2025-04-16 PROCEDURE — 36416 COLLJ CAPILLARY BLOOD SPEC: CPT

## 2025-04-16 NOTE — PROGRESS NOTES
Subjective   Brando Sexton is contacted by the anticoagulation clinic for monitoring of his long term warfarin therapy.     INR: 1.8  Jc agreed to take an additional 2 mg today only and continue with current dosing  Follow-up INR: 1 month    Current Outpatient Medications   Medication Sig Dispense Refill    warfarin (COUMADIN) 4 mg tablet ALTERNATE 2 MG WITH 4 MG TABLET BY MOUTH EVERY OTHER DAY 45 tablet 1    spironolactone (ALDACTONE) 25 mg tablet Take 0.5 tablets (12.5 mg total) by mouth daily 45 tablet 3    furosemide (LASIX) 20 mg tablet Take 1 tablet (20 mg total) by mouth daily 90 tablet 0    febuxostat (ULORIC) 80 mg tablet Take 1 tablet (80 mg total) by mouth daily 90 tablet 3    losartan (Cozaar) 25 mg tablet Take 1 tablet (25 mg total) by mouth daily 90 tablet 3    carvediloL (COREG) 6.25 mg tablet Take 1 tablet (6.25 mg total) by mouth 2 (two) times a day Unknown mg 180 tablet 3    vitamin E,dl-alpha tocopherol, (VITAMIN E, BULK, MISC) 1 tablet daily      tacrolimus (PROTOPIC) 0.1 % ointment Apply thin layers over rash twice daily as needed. Safe to use on face. Safe to use daily. If this burns, mix with vaseline when you apply. 30 g 3    triamcinolone (KENALOG) 0.1 % cream Apply thin layer over affected areas twice daily for two weeks then twice weekly for maintenance. OK to increase use again for flares. Avoid face, armpits, and groin. 454 g 3    triamcinolone (KENALOG) 0.1 % ointment Apply thin layer over affected areas twice daily for two weeks then twice weekly for maintenance. OK to increase use again for flares. Avoid face, armpits and groin. 454 g 3    warfarin (COUMADIN) 2 mg tablet Alternate 2mg with 4mg tablet every other day 45 tablet 3    ascorbic acid (VITAMIN C ORAL) Take by mouth       No current facility-administered medications for this visit.         Objective     There were no vitals taken for this visit.    Assessment/Plan      Anticoagulation Summary  As of 4/16/2025       INR goal:  2.0-3.0   INR used for dosin.8 (2025)   Full warfarin instructions:  : 6 mg; Otherwise 2 mg every Sun, Tue, Thu; 4 mg all other days   Next INR check:  2025   Priority:  Maintenance    Indications    Persistent atrial fibrillation (CMS/HCC) [I48.19]                 Anticoagulation Care Providers       Provider Role Specialty Phone number    Avery Gordon MD Referring Family Medicine [phone]            Anticoagulation therapy status: INR is stable, no dose adjustment required.  Patient verbalized understanding regarding dose, monitoring and INR.      Please reference Anticoagulation episode for additional documentation.

## 2025-04-19 DIAGNOSIS — I50.22 CHRONIC SYSTOLIC HEART FAILURE (CMS/HCC): ICD-10-CM

## 2025-04-19 DIAGNOSIS — I10 ESSENTIAL HYPERTENSION: ICD-10-CM

## 2025-04-19 DIAGNOSIS — I48.19 PERSISTENT ATRIAL FIBRILLATION (CMS/HCC): Chronic | ICD-10-CM

## 2025-04-19 NOTE — TELEPHONE ENCOUNTER
No care due was identified.  [facility] Embedded Care Due Messages. Reference number: [id number]. 4/19/2025 4:18:27 PM MDT

## 2025-04-20 NOTE — PROGRESS NOTES
CC:  Chief Complaint   Patient presents with    Gout of multiple sites, unspecified cause, unspecified        HPI:  Mr. Sexton is a very pleasant 65-year-old male with chronic gout that is in remission on febuxostat 80 mg by mouth daily     Gout  He is stable on Febuxostat 80mg daily. We will continue Febuxostat 80mg daily, order CRP, liver and kidney function tests, and uric acid level. Follow-up appointment in 1 year.     Interim:  History of Present Illness  Jc Sexton is a 65-year-old male with gout who presents for a rheumatology follow-up.    He has not experienced any recent gout flares since his last visit. He continues to take febuxostat 80 mg daily without new gastrointestinal side effects.     He is on warfarin for atrial fibrillation    He experienced a muscle strain while using his exercise bike, causing discomfort and swelling in his leg. He uses topical analgesic for relief, which he finds effective. He also wears compression socks due to fluid retention in his legs. He takes Tylenol prn for pain relief, although he finds it ineffective.         ROS:  See Interim for details    History:  Past Medical History:   Diagnosis Date    Atrial fibrillation (CMS/[facility])     COVID-19 01/14/2022    Gout     Headache 10/17/2023    Injury of knee 10/17/2023    Pain in joint, shoulder region 01/25/2012    Note: Unchanged    Psoriasis     Sprain of rotator cuff capsule 07/12/2011    Note: Unchanged     Past Surgical History:   Procedure Laterality Date    CARDIOVERSION  03/2010    COLONOSCOPY  01/01/1995    ORTHOPEDIC SURGERY Bilateral     Knee Scope    OTHER SURGICAL HISTORY Right     Orthopedic:Rotator cuff x2 Right shoulder     Social History     Socioeconomic History    Marital status:      Spouse name: Not on file    Number of children: Not on file    Years of education: Not on file    Highest education level: Not on file   Occupational History    Not on file   Tobacco Use    Smoking status:  Former    Smokeless tobacco: Former   Vaping Use    Vaping status: Never Used   Substance and Sexual Activity    Alcohol use: No    Drug use: Never    Sexual activity: Defer   Other Topics Concern    Not on file   Social History Narrative    Not on file     Social Drivers of Health     Financial Resource Strain: Patient Declined (12/19/2023)    Overall Financial Resource Strain (CARDIA)     Difficulty of Paying Living Expenses: Patient declined   Food Insecurity: Patient Declined (12/19/2023)    Hunger Vital Sign     Worried About Running Out of Food in the Last Year: Patient declined     Ran Out of Food in the Last Year: Patient declined   Transportation Needs: No Transportation Needs (12/19/2023)    PRAPARE - Transportation     Lack of Transportation (Medical): No     Lack of Transportation (Non-Medical): No   Physical Activity: Insufficiently Active (12/19/2023)    Exercise Vital Sign     Days of Exercise per Week: 1 day     Minutes of Exercise per Session: 60 min   Stress: No Stress Concern Present (12/19/2023)    [employer] [employer] - Occupational Stress Questionnaire     Feeling of Stress : Not at all   Social Connections: Unknown (12/19/2023)    Social Connection and Isolation Panel [NHANES]     Frequency of Communication with Friends and Family: Patient declined     Frequency of Social Gatherings with Friends and Family: Patient declined     Attends [employer] Services: Patient declined     Active Member of Clubs or Organizations: Patient declined     Attends Club or Organization Meetings: Never     Marital Status:    Intimate Partner Violence: Unknown (12/19/2023)    Humiliation, Afraid, Rape, and Kick questionnaire     Fear of Current or Ex-Partner: Patient declined     Emotionally Abused: Patient declined     Physically Abused: Not on file     Sexually Abused: No   Housing Stability: Unknown (12/19/2023)    Housing Stability Vital Sign     Unable to Pay for Housing in the Last  "Year: Patient refused     Number of Places Lived in the Last Year: Not on file     Unstable Housing in the Last Year: Patient refused       Objective:  Vitals:    04/23/25 1337 04/23/25 1341   BP: 120/59    BP Location: Left arm    Patient Position: Sitting    Cuff Size: Long Adult    Pulse: 70    Resp: 16    SpO2:  96%   Weight: (!) 145.1 kg (319 lb 12.8 oz)    Height: 1.981 m (6' 6\")        Physical Exam  MUSCULOSKELETAL: Visible left leg moderate swelling and difficulty getting up onto the exam table.  No evidence of DVT and the patient is currently therapeutic on warfarin for atrial fibrillation           Allergies:  Allopurinol, Cephalexin, and Nizoral [ketoconazole]    Medications:  Current Outpatient Medications on File Prior to Visit   Medication Sig Dispense Refill    furosemide (LASIX) 20 mg tablet Take 1 tablet (20 mg total) by mouth daily 90 tablet 0    warfarin (COUMADIN) 4 mg tablet ALTERNATE 2MG WITH 4MG TABLET BY MOUTH EVERY OTHER DAY 45 tablet 0    warfarin (COUMADIN) 2 mg tablet ALTERNATE  2 MG BY MOUTH WITH 4 MG TABLET EVERY OTHER DAY 45 tablet 0    losartan (COZAAR) 25 mg tablet Take 1 tablet (25 mg total) by mouth daily 90 tablet 3    carvediloL (COREG) 6.25 mg tablet Take 1 tablet (6.25 mg total) by mouth 2 (two) times a day with meals 180 tablet 3    spironolactone (ALDACTONE) 25 mg tablet Take 0.5 tablets (12.5 mg total) by mouth daily 45 tablet 3    vitamin E,dl-alpha tocopherol, (VITAMIN E, BULK, MISC) 1 tablet daily      tacrolimus (PROTOPIC) 0.1 % ointment Apply thin layers over rash twice daily as needed. Safe to use on face. Safe to use daily. If this burns, mix with vaseline when you apply. 30 g 3    triamcinolone (KENALOG) 0.1 % cream Apply thin layer over affected areas twice daily for two weeks then twice weekly for maintenance. OK to increase use again for flares. Avoid face, armpits, and groin. 454 g 3    triamcinolone (KENALOG) 0.1 % ointment Apply thin layer over affected areas " twice daily for two weeks then twice weekly for maintenance. OK to increase use again for flares. Avoid face, armpits and groin. 454 g 3    ascorbic acid (VITAMIN C ORAL) Take by mouth       No current facility-administered medications on file prior to visit.         Labs:  CBC:  Lab Results   Component Value Date    WBC 6.7 03/19/2024    HGB 15.3 03/19/2024    HCT 45.3 03/19/2024    MCV 96.3 03/19/2024    MCH 32.5 03/19/2024    MCHC 33.7 03/19/2024    RDW 13.7 03/19/2024     03/19/2024    MPV 8.4 03/19/2024      CMP:  Lab Results   Component Value Date     03/31/2025    K 4.5 03/31/2025     (H) 03/31/2025    CO2 23 03/31/2025    ANIONGAP 7 03/31/2025    BUN 39 (H) 03/31/2025    CREATININE 1.33 (H) 03/31/2025    GLUCOSE 97 03/31/2025    AST 17 08/16/2024    ALT 15 08/16/2024    ALKPHOS 88 08/16/2024    PROT 6.4 08/16/2024    ALBUMIN 3.8 08/16/2024    BILITOT 0.76 08/16/2024    EGFR 59 (L) 03/31/2025    MG 2.1 03/19/2024    URICACID 4.4 04/23/2025    CORRCALCIUM 9.1 08/16/2024      CRP:  Lab Results   Component Value Date    CRP 1.8 04/23/2025     ESR:  Lab Results   Component Value Date    SEDRATE 1 04/23/2025           Assessment/Plan:  Jc was seen today for gout of multiple sites, unspecified cause, unspecified .    Diagnoses and all orders for this visit:    Gout of multiple sites, unspecified cause, unspecified chronicity  -     Cancel: Comprehensive metabolic panel Blood, Venous; Future  -     C-reactive protein (Inflammation) Blood, Venous; Future  -     Sedimentation rate, automated Blood, Venous; Future  -     Uric acid Blood, Venous; Future  -     febuxostat (ULORIC) 80 mg tablet; Take 1 tablet (80 mg total) by mouth daily    Medication management  -     Cancel: Comprehensive metabolic panel Blood, Venous; Future  -     C-reactive protein (Inflammation) Blood, Venous; Future  -     Sedimentation rate, automated Blood, Venous; Future  -     Uric acid Blood, Venous; Future    Leg  swelling      Assessment & Plan  Gout of multiple sites  No recent flares. Febuxostat effective and affordable without new gastrointestinal side effects.  - Continue febuxostat 80 mg p.o. daily  - Update febuxostat prescription for one year, instruct pharmacy not to fill until requested.  - Order uric acid level.    Left leg swelling after an incident with a stationary bike  Compression socks recommended by orthopedics. Low risk of  thrombosis due to warfarin.  No evidence of hematoma when he was evaluated by orthopedics.  - Continue compression socks as recommended by orthopedics.  - If no improvement in 2-3 weeks, consider reevaluation.               Discussed all in detail patient voiced understanding and is in agreement with plan.    On this date of service 20 minutes of total time was spent in evaluation and management of this encounter.      Doris Yates MD      7:35 AM, 4/24/2025

## 2025-04-21 RX ORDER — LOSARTAN POTASSIUM 25 MG/1
25 TABLET ORAL DAILY
Qty: 90 TABLET | Refills: 3 | Status: SHIPPED | OUTPATIENT
Start: 2025-04-21 | End: 2026-04-21

## 2025-04-21 RX ORDER — WARFARIN 4 MG/1
TABLET ORAL
Qty: 45 TABLET | Refills: 0 | Status: SHIPPED | OUTPATIENT
Start: 2025-04-21

## 2025-04-21 RX ORDER — FUROSEMIDE 20 MG/1
20 TABLET ORAL DAILY
Qty: 90 TABLET | Refills: 0 | Status: SHIPPED | OUTPATIENT
Start: 2025-04-21

## 2025-04-21 RX ORDER — WARFARIN 2 MG/1
TABLET ORAL
Qty: 45 TABLET | Refills: 0 | Status: SHIPPED | OUTPATIENT
Start: 2025-04-21

## 2025-04-21 RX ORDER — CARVEDILOL 6.25 MG/1
6.25 TABLET ORAL 2 TIMES DAILY WITH MEALS
Qty: 180 TABLET | Refills: 3 | Status: SHIPPED | OUTPATIENT
Start: 2025-04-21 | End: 2026-04-21

## 2025-04-23 ENCOUNTER — LAB (OUTPATIENT)
Dept: LAB | Facility: CLINIC | Age: 65
End: 2025-04-23
Payer: MEDICARE

## 2025-04-23 ENCOUNTER — OFFICE VISIT (OUTPATIENT)
Dept: RHEUMATOLOGY | Facility: CLINIC | Age: 65
End: 2025-04-23
Payer: MEDICARE

## 2025-04-23 VITALS
HEART RATE: 70 BPM | HEIGHT: 78 IN | RESPIRATION RATE: 16 BRPM | BODY MASS INDEX: 36.45 KG/M2 | SYSTOLIC BLOOD PRESSURE: 120 MMHG | DIASTOLIC BLOOD PRESSURE: 59 MMHG | OXYGEN SATURATION: 96 % | WEIGHT: 315 LBS

## 2025-04-23 DIAGNOSIS — Z79.899 MEDICATION MANAGEMENT: ICD-10-CM

## 2025-04-23 DIAGNOSIS — M10.9 GOUT OF MULTIPLE SITES, UNSPECIFIED CAUSE, UNSPECIFIED CHRONICITY: Primary | ICD-10-CM

## 2025-04-23 DIAGNOSIS — M10.9 GOUT OF MULTIPLE SITES, UNSPECIFIED CAUSE, UNSPECIFIED CHRONICITY: ICD-10-CM

## 2025-04-23 DIAGNOSIS — M79.89 LEG SWELLING: ICD-10-CM

## 2025-04-23 LAB
CRP SERPL-MCNC: 1.8 MG/L
ERYTHROCYTE [SEDIMENTATION RATE] IN BLOOD: 1 MM/HR
URATE SERPL-MCNC: 4.4 MG/DL (ref 4.4–7.6)

## 2025-04-23 PROCEDURE — G0463 HOSPITAL OUTPT CLINIC VISIT: HCPCS | Mod: PO | Performed by: INTERNAL MEDICINE

## 2025-04-23 PROCEDURE — 99213 OFFICE O/P EST LOW 20 MIN: CPT | Performed by: INTERNAL MEDICINE

## 2025-04-23 PROCEDURE — 84550 ASSAY OF BLOOD/URIC ACID: CPT

## 2025-04-23 PROCEDURE — 86140 C-REACTIVE PROTEIN: CPT

## 2025-04-23 PROCEDURE — 36415 COLL VENOUS BLD VENIPUNCTURE: CPT | Mod: PO

## 2025-04-23 PROCEDURE — 85652 RBC SED RATE AUTOMATED: CPT

## 2025-04-23 RX ORDER — FEBUXOSTAT 80 MG/1
80 TABLET, FILM COATED ORAL DAILY
Qty: 90 TABLET | Refills: 3 | Status: SHIPPED | OUTPATIENT
Start: 2025-04-23 | End: 2026-04-23

## 2025-04-23 ASSESSMENT — PAIN SCALES - GENERAL: PAINLEVEL_OUTOF10: 2

## 2025-04-23 NOTE — LETTER
[facility] RHEUMATOLOGY  [address] [facility] [id number] [phone]  Dept: [phone]  04/24/25      No referring provider defined for this encounter.        To: No ref. provider found      Thank you for referring your patient, Brando Sexton, to receive care in my office. I have enclosed a copy of the note for Brando from 04/24/25.    Please contact me with any questions you may have regarding the visit.    Sincerely,         Doris Yates MD  [address] [facility] [id number] [phone]    CC: Avery Gordon MD

## 2025-05-02 ENCOUNTER — OFFICE VISIT (OUTPATIENT)
Dept: DERMATOLOGY | Facility: CLINIC | Age: 65
End: 2025-05-02
Payer: MEDICARE

## 2025-05-02 ENCOUNTER — TELEPHONE (OUTPATIENT)
Dept: DERMATOLOGY | Facility: CLINIC | Age: 65
End: 2025-05-02

## 2025-05-02 VITALS — RESPIRATION RATE: 18 BRPM | OXYGEN SATURATION: 97 % | HEART RATE: 79 BPM

## 2025-05-02 DIAGNOSIS — L20.9 ATOPIC DERMATITIS, UNSPECIFIED TYPE: Primary | ICD-10-CM

## 2025-05-02 DIAGNOSIS — L30.9 DERMATITIS: ICD-10-CM

## 2025-05-02 PROCEDURE — G0463 HOSPITAL OUTPT CLINIC VISIT: HCPCS | Mod: PO | Performed by: STUDENT IN AN ORGANIZED HEALTH CARE EDUCATION/TRAINING PROGRAM

## 2025-05-02 PROCEDURE — 99213 OFFICE O/P EST LOW 20 MIN: CPT | Performed by: STUDENT IN AN ORGANIZED HEALTH CARE EDUCATION/TRAINING PROGRAM

## 2025-05-02 RX ORDER — TRIAMCINOLONE ACETONIDE 1 MG/G
CREAM TOPICAL
Qty: 454 G | Refills: 3 | Status: SHIPPED | OUTPATIENT
Start: 2025-05-02

## 2025-05-02 RX ORDER — TRIAMCINOLONE ACETONIDE 1 MG/G
OINTMENT TOPICAL
Qty: 454 G | Refills: 3 | Status: SHIPPED | OUTPATIENT
Start: 2025-05-02

## 2025-05-02 RX ORDER — TACROLIMUS 1 MG/G
OINTMENT TOPICAL
Qty: 30 G | Refills: 3 | Status: SHIPPED | OUTPATIENT
Start: 2025-05-02

## 2025-05-02 NOTE — PROGRESS NOTES
Subjective   HPI  Brando Sexton is a 65 y.o. male without personal history of skin cancer, follow-up patient, presenting with a chief complaint of dermatitis recheck. Last seen in clinic by Ramon Khan CNP on 05/02/2024. Patient requests a lesion specific exam. Patient reports his current regimen is working well for him. He needs refills, but has not had to get any of his current medications. He uses triamcinolone cream on Mondays and Thursdays and triamcinolone ointment on Tuesdays and Fridays. Patient continues to use 1% hydrocortisone cream on his face daily amd does not want to discontinue that.    [facility] HPI:  History of Present Illness    Jc Sexton is a 65 year old male with chronic facial dermatitis who presents for medication refills.    He has been using hydrocortisone daily for his facial dermatitis but has attempted to taper off due to previous burning sensations, particularly in the corners of his mouth. He has not transitioned to tacrolimus ointment as an alternative to the steroid.    He has a history of using Nizoral and Vanicream, both of which caused burning and soreness. He describes different zones of soreness on his face, with the worst being around his nose and eyebrows, rated as an eight or nine out of ten in severity, while the beard area is less severe.    He uses a cream on his body sparingly, typically on Mondays, and is aware of the shelf life of about a year. He has been with the current provider for a little over a year, since February 2024, and had seen other dermatologists prior to that.           Otherwise denies any lesions with associated rapid growth, pigment change, or spontaneous bleeding. Denies any other concerns or acute changes to baseline health.    Review of Systems  A 12 point review of systems was performed and negative aside from discussed in HPI.    Objective   Physical Examination  Vital Signs Pulse 79   Resp 18   SpO2 97%     General: Awake,  alert and oriented x 3; no apparent distress  Skin: A localized examination of the requested areas was performed and notable for eyebrows, glabella, and bilateral medial labial folds with diffuse redness and secondary greasy scaling.      Assessment and Plan  1. Atopic dermatitis, unspecified type (Primary)  Chronic, persistent, significantly improved and currently well-controlled with topical triamcinolone as needed for flares.  Patient has used this medication sparingly on his [place] only as needed for several days at a time, unfortunately he continues to use hydrocortisone on his face daily.  -Education provided regarding diagnosis, prognosis, and recommended treatment plan including risks and benefits of recommended prescription therapy options and/or possible procedural treatments and alternatives to therapy.  -Patient acknowledged understanding and amenable to plan as outlined below.  -Risks, benefits and appropriate use of prescribed medications reviewed with patient who acknowledged understanding and amenable to plan.  -Advised to begin daily lukewarm baths, no more than 5-10, with pH balanced cleanser or moisturizing soaps such as bar white Dove soap. If no water softener, recommend Robathol or Aveeno oatmeal bath added to bathwater.  -Apply bland emollient therapy and/or medications as prescribed after patting dry.  -Recommend Free & Clear detergents. Avoid fragrances and dyes. 100% cotton clothing recommended.  -Recommend OTC emollient therapy containing active ingredients 1% dimethicone/colloidal oatmeal.    - triamcinolone (KENALOG) 0.1 % cream; Apply thin layer over affected areas twice daily for two weeks then twice weekly for maintenance. OK to increase use again for flares. Avoid face, armpits, and groin.  Dispense: 454 g; Refill: 3  - triamcinolone (KENALOG) 0.1 % ointment; Apply thin layer over affected areas twice daily for two weeks then twice weekly for maintenance. OK to increase use again for  flares. Avoid face, armpits and groin.  Dispense: 454 g; Refill: 3  - tacrolimus (PROTOPIC) 0.1 % ointment; Apply thin layers over rash twice daily as needed. Safe to use on face/genitals. Safe to use daily.  Dispense: 30 g; Refill: 3    2. Dermatitis  Chronic, persistent, worsening and not well controlled.  Patient is likely presenting with a form of topical steroid withdrawal, as patient has been unable to stop using his topical hydrocortisone.  Patient is very specific about following his only plans and often tangentially peaks about his treatment and plan without following through.  I did reiterate the importance of discontinuing topical hydrocortisone to decrease long-term complications, with tapering between hydrocortisone and tacrolimus, eventually tacrolimus only.  Patient acknowledged understanding but notes that he is not amenable to plan.  Refills provided of medications that he does use appropriately, however explained to patient that I am not able to refill hydrocortisone given my concerns for chronic use.  -Education provided regarding diagnosis, prognosis, and recommended treatment plan including risks and benefits of recommended prescription therapy options and/or possible procedural treatments and alternatives to therapy.  -Patient acknowledged understanding and amenable to plan as outlined below.  -Risks, benefits and appropriate use of prescribed medications reviewed with patient who acknowledged understanding and amenable to plan.  - triamcinolone (KENALOG) 0.1 % cream; Apply thin layer over affected areas twice daily for two weeks then twice weekly for maintenance. OK to increase use again for flares. Avoid face, armpits, and groin.  Dispense: 454 g; Refill: 3  - triamcinolone (KENALOG) 0.1 % ointment; Apply thin layer over affected areas twice daily for two weeks then twice weekly for maintenance. OK to increase use again for flares. Avoid face, armpits and groin.  Dispense: 454 g; Refill: 3  -  tacrolimus (PROTOPIC) 0.1 % ointment; Apply thin layers over rash twice daily as needed. Safe to use on face/genitals. Safe to use daily.  Dispense: 30 g; Refill: 3    [facility] A/P:  Assessment/Plan     Steroid-induced topical withdrawal  Steroid-induced topical withdrawal characterized by burning, pain, and redness, particularly in the corners of the mouth, due to chronic hydrocortisone use on the face. Transitioning to tacrolimus ointment is recommended to manage symptoms and reduce steroid dependency.  - Prescribe tacrolimus ointment to manage withdrawal symptoms and reduce steroid dependency  - Educate on the use of tacrolimus as a non-steroid anti-inflammatory    Chronic use of hydrocortisone  Chronic hydrocortisone use on the face, leading to steroid-induced topical withdrawal symptoms. Despite previous recommendations to taper, he continues daily use. Risks include skin thinning, lightening, and withdrawal. Transition to tacrolimus ointment is advised to manage symptoms and reduce dependency.  - Provide information on tapering hydrocortisone use  - Prescribe tacrolimus ointment as an alternative to hydrocortisone  - Advise against continued hydrocortisone use on the face due to potential harm              Procedure(s)             Return in about 1 year (around 5/2/2026), or if symptoms worsen or fail to improve.  Follow-up as directed above or as needed for any new or changing lesions or skin changes of concern.    Chiara Esteves MD

## 2025-05-02 NOTE — TELEPHONE ENCOUNTER
[facility] to order generic tacrolimus if needed. Understanding verbalized.

## 2025-05-06 ENCOUNTER — OFFICE VISIT NO LOS (OUTPATIENT)
Dept: DIABETES SERVICES | Facility: CLINIC | Age: 65
End: 2025-05-06
Payer: MEDICARE

## 2025-05-06 VITALS — WEIGHT: 315 LBS | BODY MASS INDEX: 36.73 KG/M2

## 2025-05-06 DIAGNOSIS — E66.01 MORBID OBESITY (CMS/HCC): Primary | ICD-10-CM

## 2025-05-06 PROCEDURE — G0447 BEHAVIOR COUNSEL OBESITY 15M: HCPCS | Performed by: DIETITIAN, REGISTERED

## 2025-05-06 NOTE — PROGRESS NOTES
"Intensive Behavior Therapy (IBT) for Obesity    Referring Provider: Dr. Gordon              Supervising Provider: Dr. Meza    IBT Visit Number: 5  Date: 5/6/25 (6-month visit near 6/18/25)  Beginning Time: 0918     End Time: 0944    BMI from initial provider referral: 40    65 year old male with a BMI of 36.73  Current height: 78 inches  Current weight: 317.8# today, 322.4# 4/1/25, 314# 2/25/25, 320.8# 1/29/25, 325.2# 12/18/24    Weight loss of 4.6# x 1 month.     Discussion:  Time spent face to face with patient:  26 minutes  Education provided: Individual  Patient attends today: unaccompanied    Assess:  Jc attends IBT f/u today. With regard to his previous goals, he has been weighing daily (dry weight) at home r/t having CHF. He is recording these in his journal and his average weight is 310#. This was also his weight this morning at home. He feels he has hit a weight loss plateau. He is experiencing more knee pain and is only using his exercise bike for about 8-10 minutes on 2 days per week. He did have an orthopedic visit and received an injection but feels this has worn off. In discussion of his diet, he is still focusing on protein and increased vegetables.     Advise:  Education today reviewed sodium in commonly eaten foods. Additional alternative exercise options discussed.     Patient has a long-term weight loss goal to breathe easier, near 250#  Resources/Handouts provided: none    Agree:  Patient is ready for behavior change.  At this time, patient would like to work on being active.     Goal(s):  Use exercise bike for 10 to 15 minutes every other day.   Use \"no added salt\" canned vegetable and tomato products.  Check out membership at [employer].     Assist:  The following behavior change tool was used today:   Monitoring by Patient and Obstacles    Arrange:  Follow up scheduled in 1 month.  "

## 2025-06-03 ENCOUNTER — TELEPHONE (OUTPATIENT)
Dept: ORTHOPEDIC SURGERY | Facility: CLINIC | Age: 65
End: 2025-06-03
Payer: MEDICARE

## 2025-06-03 NOTE — TELEPHONE ENCOUNTER
Called [facility] medical records and requested recent office visit notes related to left knee.

## 2025-06-04 ENCOUNTER — TELEPHONE (OUTPATIENT)
Dept: ORTHOPEDIC SURGERY | Facility: CLINIC | Age: 65
End: 2025-06-04

## 2025-06-04 ENCOUNTER — OFFICE VISIT (OUTPATIENT)
Dept: ORTHOPEDIC SURGERY | Facility: CLINIC | Age: 65
End: 2025-06-04
Payer: MEDICARE

## 2025-06-04 ENCOUNTER — TELEPHONE (OUTPATIENT)
Dept: FAMILY MEDICINE | Facility: CLINIC | Age: 65
End: 2025-06-04
Payer: MEDICARE

## 2025-06-04 VITALS — SYSTOLIC BLOOD PRESSURE: 110 MMHG | DIASTOLIC BLOOD PRESSURE: 64 MMHG

## 2025-06-04 DIAGNOSIS — G89.29 CHRONIC PAIN OF LEFT KNEE: Primary | ICD-10-CM

## 2025-06-04 DIAGNOSIS — I10 ESSENTIAL HYPERTENSION: Chronic | ICD-10-CM

## 2025-06-04 DIAGNOSIS — M25.562 CHRONIC PAIN OF LEFT KNEE: Primary | ICD-10-CM

## 2025-06-04 DIAGNOSIS — Z01.818 PREOP EXAMINATION: Primary | ICD-10-CM

## 2025-06-04 DIAGNOSIS — M17.12 PRIMARY OSTEOARTHRITIS OF LEFT KNEE: ICD-10-CM

## 2025-06-04 PROCEDURE — G0463 HOSPITAL OUTPT CLINIC VISIT: HCPCS | Mod: PO | Performed by: ORTHOPAEDIC SURGERY

## 2025-06-04 PROCEDURE — 99203 OFFICE O/P NEW LOW 30 MIN: CPT | Performed by: ORTHOPAEDIC SURGERY

## 2025-06-04 ASSESSMENT — ENCOUNTER SYMPTOMS
SHORTNESS OF BREATH: 0
CHILLS: 0
COUGH: 0
VOMITING: 0
NAUSEA: 0
FATIGUE: 0
ARTHRALGIAS: 1
WOUND: 0

## 2025-06-04 NOTE — TELEPHONE ENCOUNTER
Pt called stating he was given dates to schedule L knee sx and he would like 7/11/25, please call pt back and schedule

## 2025-06-04 NOTE — PROGRESS NOTES
Subjective      Brando Sexton is a 65 y.o. male who presents for left knee.    Chief Complaint   Patient presents with    Left Knee - Pain     Presents with left knee pain, seeking second opinion from [facility]. Injections about 3 months ago, relief lasted for only a month. Points out his pain mainly on the lateral knee and calf. States he has swelling in the legs. Pain today 6/10. Using topical creams for pain relief.        Mr. Sexton is a 65-year-old male who presents today with left knee pain. The left knee has been bothersome for a couple of years but has progressively gotten worse recently. Dr. Mesa has done cortisone injections every 3 months that would last 2 to 2-1/2 months but the last couple of injections were less effective. He can walk as far as he needs to but uneven surfaces and going downhill are more difficult. There is limited range of motion of the left knee.        The following have been reviewed and updated as appropriate in this visit:        Past Medical History:   Diagnosis Date    Atrial fibrillation (CMS/[facility])     COVID-19 01/14/2022    Gout     Headache 10/17/2023    Injury of knee 10/17/2023    Pain in joint, shoulder region 01/25/2012    Note: Unchanged    Psoriasis     Sprain of rotator cuff capsule 07/12/2011    Note: Unchanged     Past Surgical History:   Procedure Laterality Date    CARDIOVERSION  03/2010    COLONOSCOPY  01/01/1995    ORTHOPEDIC SURGERY Bilateral     Knee Scope    OTHER SURGICAL HISTORY Right     Orthopedic:Rotator cuff x2 Right shoulder     Family History   Problem Relation Age of Onset    Heart disease Mother     Colon cancer Mother     Heart disease Father         MI at age 87    Other Father         Crohns    Psoriasis Father     Asthma Father     Diabetes type II Father's Sister     Heart disease Father's Brother      Social History     Socioeconomic History    Marital status:    Tobacco Use    Smoking status: Former    Smokeless tobacco: Former    Vaping Use    Vaping status: Never Used   Substance and Sexual Activity    Alcohol use: No    Drug use: Never    Sexual activity: Defer     Social Drivers of Health     Tobacco Use: Medium Risk (4/23/2025)    Patient History     Smoking Tobacco Use: Former     Smokeless Tobacco Use: Former   Alcohol Use: Not At Risk (12/19/2023)    AUDIT-C     Frequency of Alcohol Consumption: Never     Average Number of Drinks: Patient does not drink     Frequency of Binge Drinking: Patient declined   Financial Resource Strain: Patient Declined (12/19/2023)    Overall Financial Resource Strain (CARDIA)     Difficulty of Paying Living Expenses: Patient declined   Food Insecurity: Patient Declined (12/19/2023)    Hunger Vital Sign     Worried About Running Out of Food in the Last Year: Patient declined     Ran Out of Food in the Last Year: Patient declined   Transportation Needs: No Transportation Needs (12/19/2023)    PRAPARE - Transportation     Lack of Transportation (Medical): No     Lack of Transportation (Non-Medical): No   Physical Activity: Insufficiently Active (12/19/2023)    Exercise Vital Sign     Days of Exercise per Week: 1 day     Minutes of Exercise per Session: 60 min   Stress: No Stress Concern Present (12/19/2023)    [employer] [place] [employer] - Occupational Stress Questionnaire     Feeling of Stress : Not at all   Social Connections: Unknown (12/19/2023)    Social Connection and Isolation Panel [NHANES]     Frequency of Communication with Friends and Family: Patient declined     Frequency of Social Gatherings with Friends and Family: Patient declined     Attends [employer] Services: Patient declined     Active Member of Clubs or Organizations: Patient declined     Attends Club or Organization Meetings: Never     Marital Status:    Housing Stability: Unknown (12/19/2023)    Housing Stability Vital Sign     Unable to Pay for Housing in the Last Year: Patient refused     Unstable Housing in the  Last Year: Patient refused   Utilities: Patient Declined (12/19/2023)    [facility] Utilities     Threatened with loss of utilities: Patient refused       Review of Systems   Constitutional:  Negative for chills and fatigue.   Respiratory:  Negative for cough and shortness of breath.    Gastrointestinal:  Negative for nausea and vomiting.   Musculoskeletal:  Positive for arthralgias (knees).   Skin:  Negative for rash and wound.   All other systems reviewed and are negative.      Objective   /64 (BP Location: Right arm, Patient Position: Sitting, Cuff Size: Large Adult)     Physical Exam  Constitutional:       General: He is not in acute distress.     Appearance: Normal appearance. He is well-developed.   HENT:      Head: Normocephalic and atraumatic.   Eyes:      Extraocular Movements: Extraocular movements intact.      Pupils: Pupils are equal, round, and reactive to light.   Musculoskeletal:      Comments: No significant joint line tenderness medially or laterally. No patellofemoral tenderness. No significant crepitation of the patellofemoral joint. Swelling of the leg with venous stasis changes distally of bilateral leg. Range of motion has a 10 degree flexion contracture and flexes to 75-80 degrees. No varus/valgus instability. Anterior/posterior drawer stable. Lachman's is stable. Hip range of motion is pain free.    Skin:     General: Skin is warm and dry.   Neurological:      Mental Status: He is alert and oriented to person, place, and time.       ASSESSMENT AND PLAN     Assessment/Plan   Diagnoses and all orders for this visit:    Chronic pain of left knee    Primary osteoarthritis of left knee      Mr. Sexton is a 65-year-old male who presents with chronic left knee pain. X-rays demonstrate bone-on-bone arthritis with osteophytes and subchondral sclerosis. He has done conservative treatment with cortisone injections, and these are becoming less effective. We discussed surgical treatment with BEATRIZ total  knee arthroplasty, and the procedure and recovery were explained to him in detail. All of his questions were answered, and he voiced understanding. He voiced understanding, and he would like to proceed with surgery. We will get this scheduled at his convenience.       Portions of this note were documented by Thais Daniel as I performed the exam and collected the information from Brando Sexton. I attest that I have reviewed the information as documented, verified the accuracy of the documentation and added additional information as needed.

## 2025-06-04 NOTE — TELEPHONE ENCOUNTER
Pt needs a Pre Op. Scheduled with Tahira. Please call Pt to schedule sooner if possible.     Pre Op - Left Knee Surgery - Dr. Gillespie - 07/11     [phone]

## 2025-06-04 NOTE — TELEPHONE ENCOUNTER
Spoke with Jc, patient chose surgery hold date with Dr iniguez for Lft TKA on 7/11, has SSA on 6/12 in person needs pkt, will see Dr Iniguez on 7/1, patient making own preop apt.

## 2025-06-10 ENCOUNTER — ANTICOAGULATION VISIT (OUTPATIENT)
Dept: ANTICOAGULATION | Facility: CLINIC | Age: 65
End: 2025-06-10
Payer: MEDICARE

## 2025-06-10 ENCOUNTER — TELEPHONE (OUTPATIENT)
Dept: CARDIOLOGY | Facility: CLINIC | Age: 65
End: 2025-06-10
Payer: MEDICARE

## 2025-06-10 VITALS — DIASTOLIC BLOOD PRESSURE: 61 MMHG | BODY MASS INDEX: 36.79 KG/M2 | WEIGHT: 315 LBS | SYSTOLIC BLOOD PRESSURE: 123 MMHG

## 2025-06-10 DIAGNOSIS — I48.19 PERSISTENT ATRIAL FIBRILLATION (CMS/HCC): Primary | Chronic | ICD-10-CM

## 2025-06-10 LAB — INR PPP: 2 (ref 0.9–1.1)

## 2025-06-10 PROCEDURE — G0463 HOSPITAL OUTPT CLINIC VISIT: HCPCS

## 2025-06-10 PROCEDURE — 85610 PROTHROMBIN TIME: CPT | Mod: QW

## 2025-06-10 NOTE — PROGRESS NOTES
Subjective   Brando Sexton presents to the anticoagulation clinic for monitoring of his long term warfarin therapy. Jc confirmed warfarin 2 mg on 3 days of the week and 4 mg on the other 4 days. He did miss a dose yesterday but will add it today's depending on INR.  Jc also has an upcoming knee replacement surgery on July 11th. Has pre-op appointment with Dr. Gordon later this week.    Today Brando reports the following:   Bleeding signs/symptoms: No   Major bleeding event: No  Thrombosis signs/symptoms: No  Thromboembolic event: No  Missed doses: No  Medication changes: No  Using a CBD oil on knee a couple times per week for pain.  Dietary changes: No  Bacterial/viral infection: no  Other concerns: No    Current Outpatient Medications   Medication Sig Dispense Refill    triamcinolone (KENALOG) 0.1 % cream Apply thin layer over affected areas twice daily for two weeks then twice weekly for maintenance. OK to increase use again for flares. Avoid face, armpits, and groin. 454 g 3    triamcinolone (KENALOG) 0.1 % ointment Apply thin layer over affected areas twice daily for two weeks then twice weekly for maintenance. OK to increase use again for flares. Avoid face, armpits and groin. 454 g 3    tacrolimus (PROTOPIC) 0.1 % ointment Apply thin layers over rash twice daily as needed. Safe to use on face/genitals. Safe to use daily. 30 g 3    febuxostat (ULORIC) 80 mg tablet Take 1 tablet (80 mg total) by mouth daily 90 tablet 3    furosemide (LASIX) 20 mg tablet Take 1 tablet (20 mg total) by mouth daily 90 tablet 0    warfarin (COUMADIN) 4 mg tablet ALTERNATE 2MG WITH 4MG TABLET BY MOUTH EVERY OTHER DAY 45 tablet 0    warfarin (COUMADIN) 2 mg tablet ALTERNATE  2 MG BY MOUTH WITH 4 MG TABLET EVERY OTHER DAY 45 tablet 0    losartan (COZAAR) 25 mg tablet Take 1 tablet (25 mg total) by mouth daily 90 tablet 3    carvediloL (COREG) 6.25 mg tablet Take 1 tablet (6.25 mg total) by mouth 2 (two) times a day with  meals 180 tablet 3    spironolactone (ALDACTONE) 25 mg tablet Take 0.5 tablets (12.5 mg total) by mouth daily 45 tablet 3    vitamin E,dl-alpha tocopherol, (VITAMIN E, BULK, MISC) 1 tablet daily      ascorbic acid (VITAMIN C ORAL) Take by mouth       No current facility-administered medications for this visit.         Objective     /61 (BP Location: Left arm, Patient Position: Sitting, Cuff Size: Large Adult)   Wt (!) 144.4 kg (318 lb 6.4 oz)   BMI 36.79 kg/m²     Assessment/Plan      Anticoagulation Summary  As of 6/10/2025      INR goal:  2.0-3.0   INR used for dosin.0 (6/10/2025)   Full warfarin instructions:  6/10: 6 mg; : Hold; : Hold; : Hold; : Hold; 7/10: Hold; Otherwise 2 mg every Sun, Tue, Thu; 4 mg all other days   Next INR check:  2025   Priority:  Maintenance    Indications    Persistent atrial fibrillation (CMS/HCC) [I48.19]                 Anticoagulation Care Providers       Provider Role Specialty Phone number    Avery Gordon MD Referring Family Medicine [phone]            Anticoagulation therapy status: INR is stable, no dose adjustment required. Continue warfarin 2 mg on Sun/Tues/Thurs and 4 mg all other days of the week. Okay to take yesterday's missed dose today (total of 6 mg). Scheduled next INR on  (~10 days postop TKA) at [facility]. He states that he thinks he has refills for his warfarin but will call the pharmacy if he does not. Patient verbalized understanding regarding dose, monitoring and INR.    Additional education : Has pre-op appointment for knee replacement with Dr. Gordon later this week. Jc states that he has never bridged with enoxaparin injections in the past when holding warfarin. Will likely hold warfarin 5 days prior to procedure. He states in the past he has had difficulty bringing up the INR after holding for that long and would prefer to hold for 3 days. Discussed risks of bleeding intra-op and would prefer holding the  5 days but that decision is ultimately up to his PCP and ortho team.     Please reference Anticoagulation episode for additional documentation.

## 2025-06-10 NOTE — TELEPHONE ENCOUNTER
"This pt called to inform Mariha Mohamud CNP that he is having a left knee surgery. Pt is seeing Dr Gordon on Thurs 6/12/2025. Pt is asking \"if Mariah thinks his heart is strong enough for the surgery.\" Will forward to Mariah Mohamud CNP.     Mariah Mohamud CNP responded \"Yes he is optimized, normal EF, and recent stress test in 2024 showed no ischemia. He can have surgery but will need to hold his anticoagulation for 2 days before surgery.  Actually he has warfarin. So he will need to refer to general cardiology on bridging/holding warfarin.\"    Spoke with pt regarding his heart and his warfarin and his upcoming knee surgery. Pt will contact the coumadin clinic in [place] to discuss the bridging prior to surgery.        "

## 2025-06-12 ENCOUNTER — OFFICE VISIT (OUTPATIENT)
Dept: FAMILY MEDICINE | Facility: CLINIC | Age: 65
End: 2025-06-12
Payer: MEDICARE

## 2025-06-12 ENCOUNTER — LAB (OUTPATIENT)
Dept: LAB | Facility: CLINIC | Age: 65
End: 2025-06-12
Payer: MEDICARE

## 2025-06-12 ENCOUNTER — TELEPHONE (OUTPATIENT)
Dept: SOCIAL WORK | Facility: HOSPITAL | Age: 65
End: 2025-06-12
Payer: MEDICARE

## 2025-06-12 ENCOUNTER — APPOINTMENT (OUTPATIENT)
Dept: CARDIOLOGY | Facility: CLINIC | Age: 65
End: 2025-06-12
Payer: MEDICARE

## 2025-06-12 VITALS
WEIGHT: 315 LBS | BODY MASS INDEX: 36.63 KG/M2 | TEMPERATURE: 97.3 F | OXYGEN SATURATION: 95 % | DIASTOLIC BLOOD PRESSURE: 62 MMHG | SYSTOLIC BLOOD PRESSURE: 117 MMHG | HEART RATE: 55 BPM

## 2025-06-12 DIAGNOSIS — I48.19 PERSISTENT ATRIAL FIBRILLATION (CMS/HCC): Primary | Chronic | ICD-10-CM

## 2025-06-12 DIAGNOSIS — I10 ESSENTIAL HYPERTENSION: Chronic | ICD-10-CM

## 2025-06-12 DIAGNOSIS — G47.33 MODERATE OBSTRUCTIVE SLEEP APNEA: Chronic | ICD-10-CM

## 2025-06-12 DIAGNOSIS — Z01.818 PREOP EXAMINATION: ICD-10-CM

## 2025-06-12 DIAGNOSIS — M05.00 FELTY'S SYNDROME (CMS/HCC): Chronic | ICD-10-CM

## 2025-06-12 DIAGNOSIS — I50.22 CHRONIC SYSTOLIC HEART FAILURE (CMS/HCC): Chronic | ICD-10-CM

## 2025-06-12 DIAGNOSIS — Z01.818 PRE-OPERATIVE EXAM: ICD-10-CM

## 2025-06-12 LAB
ALBUMIN SERPL-MCNC: 4.1 G/DL (ref 3.5–5.3)
ALP SERPL-CCNC: 81 U/L (ref 45–115)
ALT SERPL-CCNC: 13 U/L (ref 7–52)
ANION GAP SERPL CALC-SCNC: 6 MMOL/L (ref 3–11)
AST SERPL-CCNC: 17 U/L
BASOPHILS # BLD AUTO: 0.07 10*3/UL
BASOPHILS NFR BLD AUTO: 0.9 % (ref 0–2)
BILIRUB SERPL-MCNC: 0.99 MG/DL (ref 0.2–1.4)
BUN SERPL-MCNC: 32 MG/DL (ref 7–25)
CALCIUM ALBUM COR SERPL-MCNC: 9 MG/DL (ref 8.6–10.3)
CALCIUM SERPL-MCNC: 9.1 MG/DL (ref 8.6–10.3)
CHLORIDE SERPL-SCNC: 109 MMOL/L (ref 98–107)
CO2 SERPL-SCNC: 26 MMOL/L (ref 21–32)
CREAT SERPL-MCNC: 1.36 MG/DL (ref 0.7–1.3)
EGFRCR SERPLBLD CKD-EPI 2021: 57 ML/MIN/1.73M*2
EOSINOPHIL # BLD AUTO: 0.54 10*3/UL
EOSINOPHIL NFR BLD AUTO: 7 % (ref 0–3)
ERYTHROCYTE DISTRIBUTION WIDTH STANDARD DEVIATION: 47.7 FL (ref 35.1–43.9)
ERYTHROCYTE [DISTWIDTH] IN BLOOD BY AUTOMATED COUNT: 12.9 % (ref 11.5–15)
GLUCOSE SERPL-MCNC: 97 MG/DL (ref 70–105)
HCT VFR BLD AUTO: 46.8 % (ref 38–50)
HGB BLD-MCNC: 15.2 G/DL (ref 13.2–17.2)
IMMATURE GRANULOCYTES (10*3/UL) LEUKOCYTES IN BLOOD BY AUTOMATED COUNT: <0.03 10*3/UL
IMMATURE GRANULOCYTES/100 LEUKOCYTES IN BLOOD BY AUTOMATED COUNT: 0.1 %
LYMPHOCYTES # BLD AUTO: 1.89 10*3/UL
LYMPHOCYTES NFR BLD AUTO: 24.5 % (ref 15–47)
MCH RBC QN AUTO: 32.1 PG (ref 29–34)
MCHC RBC AUTO-ENTMCNC: 32.5 G/DL (ref 32–36)
MCV RBC AUTO: 98.7 FL (ref 82–97)
MONOCYTES # BLD AUTO: 0.72 10*3/UL
MONOCYTES NFR BLD AUTO: 9.3 % (ref 5–13)
NEUTROPHILS # BLD AUTO: 4.49 10*3/UL
NEUTROPHILS NFR BLD AUTO: 58.2 % (ref 46–70)
NRBC (10*3/UL) BY AUTOMATED COUNT: 0 10*3/UL (ref 0–0.1)
NRBC BLD-RTO: 0 /100 WBCS (ref 0–2)
PLATELET # BLD AUTO: 206 10*3/UL (ref 130–350)
PMV BLD AUTO: 10 FL (ref 6.9–10.8)
POTASSIUM SERPL-SCNC: 4.2 MMOL/L (ref 3.5–5.1)
PROT SERPL-MCNC: 6.7 G/DL (ref 6–8.3)
RBC # BLD AUTO: 4.74 10*6/UL (ref 4.1–5.8)
SODIUM SERPL-SCNC: 141 MMOL/L (ref 135–145)
WBC # BLD AUTO: 7.7 10*3/UL (ref 3.7–9.6)

## 2025-06-12 PROCEDURE — G0463 HOSPITAL OUTPT CLINIC VISIT: HCPCS | Performed by: FAMILY MEDICINE

## 2025-06-12 PROCEDURE — 85025 COMPLETE CBC W/AUTO DIFF WBC: CPT

## 2025-06-12 PROCEDURE — 36415 COLL VENOUS BLD VENIPUNCTURE: CPT

## 2025-06-12 PROCEDURE — 99214 OFFICE O/P EST MOD 30 MIN: CPT | Performed by: FAMILY MEDICINE

## 2025-06-12 PROCEDURE — G2211 COMPLEX E/M VISIT ADD ON: HCPCS | Performed by: FAMILY MEDICINE

## 2025-06-12 PROCEDURE — 93010 ELECTROCARDIOGRAM REPORT: CPT | Performed by: INTERNAL MEDICINE

## 2025-06-12 PROCEDURE — 93005 ELECTROCARDIOGRAM TRACING: CPT | Performed by: FAMILY MEDICINE

## 2025-06-12 PROCEDURE — 80053 COMPREHEN METABOLIC PANEL: CPT

## 2025-06-12 ASSESSMENT — CHA2DS2 SCORE
HYPERTENSION: YES
SEX: MALE
PRIOR STROKE OR TIA OR THROMBOEMBOLISM: YES
DIABETES: NO
AGE IN YEARS: 65-74

## 2025-06-12 NOTE — LETTER
06/12/25      [facility] [address]  [address] [place] [address]   Dept: [phone]       Dear Jc,     Below is a copy of today's assessment and plan.  If you have questions please call Ruth at [phone] or you may send us nonemergent messages through [employer].    ASSESSMENT  AND PLAN:    Surgical condition/surgery planned:   The patient's condition is optimized for surgery    Revised cardiac index score:    2 points, Class III Risk, 10.1 %, 30-day risk of death, MI, or cardiac arrest  EKG today: A solitary PVC and 1 couplet.  Otherwise, acceptable for surgery  CBC today: acceptable  CMP today: in process; [facility] 3/31/2025 acceptable  Hold  NSAIDS (aspirin, Aleve, Motrin, Advil, ibuprofen and Excedrin) 7 days prior to surgery  In the meantime, you may take Tylenol (acetaminophen) 1000 mg p.o. 3 times daily  Hold Coumadin 6 days prior to the procedure and restart as soon as Dr. Gillespie allows you afterwards  Hold all medications the morning of surgery;   you may take your morning medications following surgery when you are able to eat/drink.      Diagnoses and all orders for this visit:    Persistent atrial fibrillation (CMS/HCC)    Chronic systolic heart failure (CMS/HCC)    Moderate obstructive sleep apnea    Felty's syndrome (CMS/HCC)    Essential hypertension    Pre-operative exam        Artificial Intelligence generated Assessment/Plan       Preoperative Evaluation for Total Knee Arthroplasty  Preoperative evaluation for total knee arthroplasty scheduled with Dr. Gillespie on July 11, 2025. Labs and EKG are reassuring, with EKG showing a PVC and one couplet, otherwise acceptable for surgery. Discussion on anticoagulation management due to atrial fibrillation and TIA. CHADS-VASc score of 5 indicates moderate risk for thromboembolism. Decision made to hold Coumadin for 6 days prior to surgery without bridging therapy due to increased bleeding risk with bridging.    - Patient's medical condition is optimized for surgery  - Hold Coumadin for 6 days prior to surgery  - Restart Coumadin post-surgery at usual dose  - Discuss with surgeon possibility of reducing Coumadin hold to 5 days    Primary Osteoarthritis of the Knee  Primary osteoarthritis of the knee, reason for upcoming total knee arthroplasty. Experiences knee pain, occasionally uses acetaminophen for pain management.  - Proceed with total knee arthroplasty as scheduled  - Use acetaminophen for pain management as needed    Persistent Atrial Fibrillation  Persistent atrial fibrillation managed with carvedilol for rate control and Coumadin for anticoagulation. Unsuccessful cardioversion. Moderate risk for thromboembolism with CHADS-VASc score of 5. Decision to hold Coumadin for surgery without bridging due to bleeding risk. The risk of thromboembolism for missing Coumadin for 12 days is approximately 0.1%.  - Continue carvedilol 6.25 mg BID  - Hold Coumadin for 6 days prior to surgery  - Restart Coumadin post-surgery at usual dose    Chronic Systolic Heart Failure with Preserved Ejection Fraction  Chronic systolic heart failure with preserved ejection fraction (EF 52%), NYHA class I-II, stage C. Managed with spironolactone, losartan, and furosemide. Recent echocardiogram and stress test show well-managed condition. Unable to afford Entresto and Jardiance, continues on current medications.  - Continue spironolactone 12.5 mg daily  - Continue losartan 25 mg daily  - Continue furosemide 20 mg daily    Primary Hypertension  Primary hypertension managed with losartan. Blood pressure control appears well-managed with current regimen.  - Continue losartan 25 mg daily    Moderate Obstructive Sleep Apnea Syndrome  Moderate obstructive sleep apnea syndrome.    Felty Syndrome  Felty syndrome with seropositive rheumatoid arthritis, neutropenia, and splenomegaly. Well-controlled with normal CBC, ESR, and CRP. Follows with  rheumatology.    General Health Maintenance  No diabetes, with recent A1c around 5.2 after dietary changes.  - Continue dietary modifications and follow-up with dietitian    Follow-up  Discussion on follow-up post-surgery and medication management. Emphasis on taking medications in original bottles to the hospital to avoid additional costs.  - Take medications in original bottles to the hospital on the day of surgery  - Follow-up with surgeon post-surgery for Coumadin management            Highlighting legend:  Orders  New medications  Decreased or discontinued medication doses    This document was completed using a Dragon voice recognition device; sometimes words are not transcribed exactly as they are spoken    ROXIE GUILLERMO MD  06/12/25

## 2025-06-12 NOTE — PROGRESS NOTES
HPI:    Brando Sexton is a 65 y.o. male who presents for their     PRESURGICAL EVALUATION      Dear Brando Sexton    Below is a copy of today's assessment and plan.  If you have questions please call Ruth at [phone] or you may send us nonemergent messages through [employer].    ASSESSMENT  AND PLAN:    Surgical condition/surgery planned:   The patient's condition is optimized for surgery    Revised cardiac index score:    2 points, Class III Risk, 10.1 %, 30-day risk of death, MI, or cardiac arrest  EKG today: A solitary PVC and 1 couplet.  Otherwise, acceptable for surgery  CBC today: acceptable  CMP today: in process; BMP 3/31/2025 acceptable  Hold  NSAIDS (aspirin, Aleve, Motrin, Advil, ibuprofen and Excedrin) 7 days prior to surgery  In the meantime, you may take Tylenol (acetaminophen) 1000 mg p.o. 3 times daily  Hold Coumadin 6 days prior to the procedure and restart as soon as Dr. Gillespie allows you afterwards  Hold all medications the morning of surgery;   you may take your morning medications following surgery when you are able to eat/drink.      Diagnoses and all orders for this visit:    Persistent atrial fibrillation (CMS/HCC)    Chronic systolic heart failure (CMS/HCC)    Moderate obstructive sleep apnea    Felty's syndrome (CMS/HCC)    Essential hypertension    Pre-operative exam        Artificial Intelligence generated Assessment/Plan       Preoperative Evaluation for Total Knee Arthroplasty  Preoperative evaluation for total knee arthroplasty scheduled with Dr. Gillespie on July 11, 2025. Labs and EKG are reassuring, with EKG showing a PVC and one couplet, otherwise acceptable for surgery. Discussion on anticoagulation management due to atrial fibrillation and TIA. CHADS-VASc score of 5 indicates moderate risk for thromboembolism. Decision made to hold Coumadin for 6 days prior to surgery without bridging therapy due to increased bleeding risk with bridging.   - Patient's medical  condition is optimized for surgery  - Hold Coumadin for 6 days prior to surgery  - Restart Coumadin post-surgery at usual dose  - Discuss with surgeon possibility of reducing Coumadin hold to 5 days    Primary Osteoarthritis of the Knee  Primary osteoarthritis of the knee, reason for upcoming total knee arthroplasty. Experiences knee pain, occasionally uses acetaminophen for pain management.  - Proceed with total knee arthroplasty as scheduled  - Use acetaminophen for pain management as needed    Persistent Atrial Fibrillation  Persistent atrial fibrillation managed with carvedilol for rate control and Coumadin for anticoagulation. Unsuccessful cardioversion. Moderate risk for thromboembolism with CHADS-VASc score of 5. Decision to hold Coumadin for surgery without bridging due to bleeding risk. The risk of thromboembolism for missing Coumadin for 12 days is approximately 0.1%.  - Continue carvedilol 6.25 mg BID  - Hold Coumadin for 6 days prior to surgery  - Restart Coumadin post-surgery at usual dose    Chronic Systolic Heart Failure with Preserved Ejection Fraction  Chronic systolic heart failure with preserved ejection fraction (EF 52%), NYHA class I-II, stage C. Managed with spironolactone, losartan, and furosemide. Recent echocardiogram and stress test show well-managed condition. Unable to afford Entresto and Jardiance, continues on current medications.  - Continue spironolactone 12.5 mg daily  - Continue losartan 25 mg daily  - Continue furosemide 20 mg daily    Primary Hypertension  Primary hypertension managed with losartan. Blood pressure control appears well-managed with current regimen.  - Continue losartan 25 mg daily    Moderate Obstructive Sleep Apnea Syndrome  Moderate obstructive sleep apnea syndrome.    Felty Syndrome  Felty syndrome with seropositive rheumatoid arthritis, neutropenia, and splenomegaly. Well-controlled with normal CBC, ESR, and CRP. Follows with rheumatology.    General Health  Maintenance  No diabetes, with recent A1c around 5.2 after dietary changes.  - Continue dietary modifications and follow-up with dietitian    Follow-up  Discussion on follow-up post-surgery and medication management. Emphasis on taking medications in original bottles to the hospital to avoid additional costs.  - Take medications in original bottles to the hospital on the day of surgery  - Follow-up with surgeon post-surgery for Coumadin management            Highlighting legend:  Orders  New medications  Decreased or discontinued medication doses    This document was completed using a Dragon voice recognition device; sometimes words are not transcribed exactly as they are spoken    ROXIE GUILLERMO MD  06/12/25        --------------------------------------------------    History of Present Illness    Jc Sexton is a 65 year old male with persistent atrial fibrillation and systolic heart failure who presents for a preoperative examination for total knee arthroplasty.    He is scheduled for a total knee arthroplasty on July 11, 2025, and is here for a preoperative examination. His labs are reassuring, and his EKG shows a PVC and one couplet, which are considered acceptable for surgery.    He has a history of persistent atrial fibrillation and systolic heart failure. For heart failure, he takes spironolactone 12.5 mg daily, losartan 25 mg daily, and furosemide 20 mg daily. For atrial fibrillation, he is on carvedilol 6.25 mg twice daily for rate control and Coumadin for anticoagulation. An echocardiogram in April 2025 showed an ejection fraction of 52%. He has aortic valve sclerosis without significant stenosis and no evidence of pulmonary hypertension. He has a history of a TIA and was previously on lisinopril and Entresto, which were discontinued. A walking lexicon stress test showed no ischemia concerns.    He also has moderate obstructive sleep apnea syndrome, primary osteoarthritis of the knee, and primary  hypertension. He has a history of Felty syndrome, characterized by seropositive rheumatoid arthritis with neutropenia and splenomegaly. His CBC today is normal without signs of neutropenia. His sed rate and CRP were normal in April 2025.    He has a moderate risk for thromboembolism with a CHADS-VASc score of 5. He has experienced difficulty in the past getting his INR back to therapeutic levels after stopping Coumadin for procedures. No current chest pain, palpitations, exercise intolerance, or syncope. He occasionally takes Tylenol for knee pain and avoids NSAIDs due to Coumadin use.    He has been working with a dietitian and has lost weight. He does not have diabetes, with recent blood sugar levels around 5.2 after dietary changes.                Revised cardiac index questionnaire:    High-Risk Surgery   no  Intraperitoneal   no  Intrathoracic   no  Suprainguinal vascular   no      History of ischemic heart disease   no  History of MI   no  History of positive exercise test   no  Current chest pain considered due to myocardial ischemia   no  Use of nitrate therapy   no  ECG with pathological Q waves   no; disagree with computer read on the EKG    History of congestive heart failure  YES, chronic systolic heart failure with moderately reduced EF in the past more recently normal  Pulmonary edema, bilateral rales or S3 gallop   no  Paroxysmal nocturnal dyspnea   no  CXR showing pulmonary vascular redistribution   no      History of cerebrovascular disease   no  Prior TIA or stroke  Yes, chart history of previous TIA; patient currently on Coumadin therapy  Pre-operative treatment with insulin    no  Pre-operative creatinine >2 mg/dL / 176.8 µmol/L    no     METS: >4    Brando Sexton's Revised Cardiac Index score:        2 points, Class III Risk, 10.1 %, 30-day risk of death, MI, or cardiac arrest        The following have been reviewed and updated as appropriate in this visit:        Allergies   Allergen  Reactions    Allopurinol     Cephalexin     Nizoral [Ketoconazole] Other (see comments)     Stinging and burning     Current Outpatient Medications   Medication Sig Dispense Refill    triamcinolone (KENALOG) 0.1 % cream Apply thin layer over affected areas twice daily for two weeks then twice weekly for maintenance. OK to increase use again for flares. Avoid face, armpits, and groin. 454 g 3    triamcinolone (KENALOG) 0.1 % ointment Apply thin layer over affected areas twice daily for two weeks then twice weekly for maintenance. OK to increase use again for flares. Avoid face, armpits and groin. 454 g 3    tacrolimus (PROTOPIC) 0.1 % ointment Apply thin layers over rash twice daily as needed. Safe to use on face/genitals. Safe to use daily. 30 g 3    febuxostat (ULORIC) 80 mg tablet Take 1 tablet (80 mg total) by mouth daily 90 tablet 3    furosemide (LASIX) 20 mg tablet Take 1 tablet (20 mg total) by mouth daily 90 tablet 0    warfarin (COUMADIN) 4 mg tablet ALTERNATE 2MG WITH 4MG TABLET BY MOUTH EVERY OTHER DAY 45 tablet 0    warfarin (COUMADIN) 2 mg tablet ALTERNATE  2 MG BY MOUTH WITH 4 MG TABLET EVERY OTHER DAY 45 tablet 0    losartan (COZAAR) 25 mg tablet Take 1 tablet (25 mg total) by mouth daily 90 tablet 3    carvediloL (COREG) 6.25 mg tablet Take 1 tablet (6.25 mg total) by mouth 2 (two) times a day with meals 180 tablet 3    spironolactone (ALDACTONE) 25 mg tablet Take 0.5 tablets (12.5 mg total) by mouth daily 45 tablet 3    vitamin E,dl-alpha tocopherol, (VITAMIN E, BULK, MISC) 1 tablet daily      ascorbic acid (VITAMIN C ORAL) Take by mouth       No current facility-administered medications for this visit.     Past Medical History:   Diagnosis Date    Atrial fibrillation (CMS/[facility])     COVID-19 01/14/2022    Gout     Headache 10/17/2023    Injury of knee 10/17/2023    Pain in joint, shoulder region 01/25/2012    Note: Unchanged    Psoriasis     Sprain of rotator cuff capsule 07/12/2011    Note:  Unchanged     Past Surgical History:   Procedure Laterality Date    CARDIOVERSION  03/2010    COLONOSCOPY  01/01/1995    ORTHOPEDIC SURGERY Bilateral     Knee Scope    OTHER SURGICAL HISTORY Right     Orthopedic:Rotator cuff x2 Right shoulder     Family History   Problem Relation Age of Onset    Heart disease Mother     Colon cancer Mother     Heart disease Father         MI at age 87    Other Father         Crohns    Psoriasis Father     Asthma Father     Diabetes type II Father's Sister     Heart disease Father's Brother      Social History     Socioeconomic History    Marital status:    Tobacco Use    Smoking status: Former    Smokeless tobacco: Former   Vaping Use    Vaping status: Never Used   Substance and Sexual Activity    Alcohol use: No    Drug use: Never    Sexual activity: Defer     Social Drivers of Health     Tobacco Use: Medium Risk (6/12/2025)    Patient History     Smoking Tobacco Use: Former     Smokeless Tobacco Use: Former   Alcohol Use: Not At Risk (12/19/2023)    AUDIT-C     Frequency of Alcohol Consumption: Never     Average Number of Drinks: Patient does not drink     Frequency of Binge Drinking: Patient declined   Financial Resource Strain: Patient Declined (12/19/2023)    Overall Financial Resource Strain (CARDIA)     Difficulty of Paying Living Expenses: Patient declined   Food Insecurity: Patient Declined (12/19/2023)    Hunger Vital Sign     Worried About Running Out of Food in the Last Year: Patient declined     Ran Out of Food in the Last Year: Patient declined   Transportation Needs: No Transportation Needs (12/19/2023)    PRAPARE - Transportation     Lack of Transportation (Medical): No     Lack of Transportation (Non-Medical): No   Physical Activity: Insufficiently Active (12/19/2023)    Exercise Vital Sign     Days of Exercise per Week: 1 day     Minutes of Exercise per Session: 60 min   Stress: No Stress Concern Present (12/19/2023)    [employer] - Occupational Stress Questionnaire     Feeling of Stress : Not at all   Social Connections: Unknown (12/19/2023)    Social Connection and Isolation Panel [NHANES]     Frequency of Communication with Friends and Family: Patient declined     Frequency of Social Gatherings with Friends and Family: Patient declined     Attends Yarsani Services: Patient declined     Active Member of Clubs or Organizations: Patient declined     Attends Club or Organization Meetings: Never     Marital Status:    Housing Stability: Unknown (12/19/2023)    Housing Stability Vital Sign     Unable to Pay for Housing in the Last Year: Patient refused     Unstable Housing in the Last Year: Patient refused   Utilities: Patient Declined (12/19/2023)    [facility] Utilities     Threatened with loss of utilities: Patient refused       Review of Systems    Physical Exam:  /62   Pulse 55   Temp 36.3 °C (97.3 °F)   Wt (!) 143.8 kg (317 lb)   SpO2 95%   BMI 36.63 kg/m²   Physical Exam  Vitals and nursing note reviewed.   Constitutional:       General: He is not in acute distress.     Appearance: He is well-developed. He is not diaphoretic.   HENT:      Head: Normocephalic and atraumatic.      Nose: Nose normal.      Mouth/Throat:      Pharynx: No oropharyngeal exudate.   Eyes:      General: No scleral icterus.        Right eye: No discharge.         Left eye: No discharge.      Conjunctiva/sclera: Conjunctivae normal.      Pupils: Pupils are equal, round, and reactive to light.   Neck:      Thyroid: No thyromegaly.      Vascular: No JVD.      Trachea: No tracheal deviation.   Cardiovascular:      Rate and Rhythm: Normal rate and regular rhythm.      Heart sounds: Normal heart sounds. No murmur heard.     No friction rub. No gallop.   Pulmonary:      Effort: Pulmonary effort is normal. No respiratory distress.      Breath sounds: Normal breath sounds. No stridor. No wheezing or rales.   Abdominal:      General: Bowel sounds are  normal. There is no distension.      Palpations: Abdomen is soft.      Tenderness: There is no abdominal tenderness.   Musculoskeletal:         General: No deformity. Normal range of motion.      Cervical back: Neck supple.   Skin:     General: Skin is warm and dry.      Findings: No rash.   Neurological:      Mental Status: He is alert and oriented to person, place, and time.   Psychiatric:         Behavior: Behavior normal.         Thought Content: Thought content normal.

## 2025-06-12 NOTE — TELEPHONE ENCOUNTER
Discussed upcoming joint surgery, tentatively scheduled for July 11th, Jc is due to speak with the  tomorrow to confirm everything, he does have the guide book, pre-op information sheets and information regarding the pre-recorded joint class. We discussed home situation, plans for discharge and the pre-op joint class, which he scheduled for June 23rd. He requests outpatient PT at [facility] [place].  Questions answered.

## 2025-06-13 ENCOUNTER — CLINICAL SUPPORT (OUTPATIENT)
Dept: ORTHOPEDIC SURGERY | Facility: CLINIC | Age: 65
End: 2025-06-13
Payer: MEDICARE

## 2025-06-13 NOTE — PROGRESS NOTES
Nurse visit: In person  All surgery instructions discussed with patient. Patient has surgery packet.  Dr. Zane Gillespie  [facility]  Surgery date: July 11,2025    Brando Sexton  65 y.o.  male  1960   [id number]     Procedure: LEFT TOTAL KNEE ARTHROPLASTY USING ROBOTIC ORTHOPEDIC SURGICAL ASSISTANT   SAME DAY total joint:No  Rep needed: [employer] ([place])  Special equipment (list if needed):   Case length time:   Pre-op and date: Dr Gordon 6/12, Dr Gillespie 7/1  Special requests (list if needed):   Patient has guidebook/handouts (KASH and TKA): yes  Is attending joint class

## 2025-06-17 ENCOUNTER — OFFICE VISIT NO LOS (OUTPATIENT)
Dept: DIABETES SERVICES | Facility: CLINIC | Age: 65
End: 2025-06-17
Payer: MEDICARE

## 2025-06-17 VITALS — WEIGHT: 315 LBS | BODY MASS INDEX: 36.77 KG/M2

## 2025-06-17 DIAGNOSIS — E66.01 MORBID OBESITY (CMS/HCC): Primary | ICD-10-CM

## 2025-06-17 PROCEDURE — G0447 BEHAVIOR COUNSEL OBESITY 15M: HCPCS | Performed by: DIETITIAN, REGISTERED

## 2025-06-17 NOTE — PROGRESS NOTES
"Intensive Behavior Therapy (IBT) for Obesity    Referring Provider: Dr. Gordon              Supervising Provider: Dr. Gordon    IBT Visit Number: 6  Date: 6/17/25 (6-month visit near 6/18/25)  Beginning Time: 0922     End Time: 0945    BMI from initial provider referral: 40    65 year old male with a BMI of 36.77  Current height: 78 inches  Current weight: 318.2# today, 317.8# 5/6/25, 322.4# 4/1/25, 314# 2/25/25, 320.8# 1/29/25, 325.2# 12/18/24    Weight loss of 7# x 6 months.     Discussion:  Time spent face to face with patient: 23 minutes  Education provided: Individual  Patient attends today: unaccompanied    Assess:  Jc attends IBT f/u today. This is his 6-month visit - he has achieved a 7# weight loss so is able to continue with monthly IBT visits. He states that his weight has been stable at home around 310-312#. This is positive with regard to his CHF. He feels at this time that his biggest shavon with weight loss is his decreased mobility. He is scheduled for left total knee surgery on July 11th. He did get a membership for swimming at the [employer] - goal met. He has been water walking and sometimes swimming when there is room for lap swimming. He reports that this exercise does feel great. He attends the pool on 3 days per week. He is also using his bike at home on some days. With regard to his final goal of canned vegetables - he is using \"no added salt\" of \"low sodium\" vegetables.     Advise:  Education today reviewed exercise regimen and success of lifestyle change to date.     Patient has a long-term weight loss goal to breathe easier, near 250#  Resources/Handouts provided: none    Agree:  Patient is ready for behavior change.  At this time, patient would like to work on being active.     Goal(s):  Maintain exercise per physical therapy recommendations following surgery.     Assist:  The following behavior change tool was used today:   Confidence and Discussion    Arrange:  Due to orthopedic " surgery, Jc will call for f/u after his surgery.

## 2025-06-17 NOTE — H&P (VIEW-ONLY)
"Intensive Behavior Therapy (IBT) for Obesity    Referring Provider: Dr. Gordon              Supervising Provider: Dr. Gordon    IBT Visit Number: 6  Date: 6/17/25 (6-month visit near 6/18/25)  Beginning Time: 0922     End Time: 0945    BMI from initial provider referral: 40    65 year old male with a BMI of 36.77  Current height: 78 inches  Current weight: 318.2# today, 317.8# 5/6/25, 322.4# 4/1/25, 314# 2/25/25, 320.8# 1/29/25, 325.2# 12/18/24    Weight loss of 7# x 6 months.     Discussion:  Time spent face to face with patient: 23 minutes  Education provided: Individual  Patient attends today: unaccompanied    Assess:  Jc attends IBT f/u today. This is his 6-month visit - he has achieved a 7# weight loss so is able to continue with monthly IBT visits. He states that his weight has been stable at home around 310-312#. This is positive with regard to his CHF. He feels at this time that his biggest shavon with weight loss is his decreased mobility. He is scheduled for left total knee surgery on July 11th. He did get a membership for swimming at the [employer] - goal met. He has been water walking and sometimes swimming when there is room for lap swimming. He reports that this exercise does feel great. He attends the pool on 3 days per week. He is also using his bike at home on some days. With regard to his final goal of canned vegetables - he is using \"no added salt\" of \"low sodium\" vegetables.     Advise:  Education today reviewed exercise regimen and success of lifestyle change to date.     Patient has a long-term weight loss goal to breathe easier, near 250#  Resources/Handouts provided: none    Agree:  Patient is ready for behavior change.  At this time, patient would like to work on being active.     Goal(s):  Maintain exercise per physical therapy recommendations following surgery.     Assist:  The following behavior change tool was used today:   Confidence and Discussion    Arrange:  Due to orthopedic " surgery, Jc will call for f/u after his surgery.

## 2025-06-23 ENCOUNTER — HOSPITAL ENCOUNTER (OUTPATIENT)
Dept: PHYSICAL THERAPY | Facility: HOSPITAL | Age: 65
Setting detail: THERAPIES SERIES
Discharge: STILL ADMITTED OR ADMINISTRATIVE | End: 2025-06-23
Payer: MEDICARE

## 2025-06-26 DIAGNOSIS — Z47.89 ENCOUNTER FOR ORTHOPEDIC FOLLOW-UP CARE: Primary | ICD-10-CM

## 2025-07-01 ENCOUNTER — TELEPHONE (OUTPATIENT)
Dept: ORTHOPEDIC SURGERY | Facility: CLINIC | Age: 65
End: 2025-07-01

## 2025-07-01 ENCOUNTER — OFFICE VISIT (OUTPATIENT)
Dept: ORTHOPEDIC SURGERY | Facility: CLINIC | Age: 65
End: 2025-07-01
Payer: MEDICARE

## 2025-07-01 VITALS
BODY MASS INDEX: 36.45 KG/M2 | HEIGHT: 78 IN | SYSTOLIC BLOOD PRESSURE: 128 MMHG | DIASTOLIC BLOOD PRESSURE: 74 MMHG | WEIGHT: 315 LBS

## 2025-07-01 DIAGNOSIS — Z48.89 POSTOPERATIVE VISIT: ICD-10-CM

## 2025-07-01 DIAGNOSIS — M17.12 PRIMARY OSTEOARTHRITIS OF LEFT KNEE: Primary | ICD-10-CM

## 2025-07-01 DIAGNOSIS — Z96.652 S/P TOTAL KNEE ARTHROPLASTY, LEFT: ICD-10-CM

## 2025-07-01 PROCEDURE — G0463 HOSPITAL OUTPT CLINIC VISIT: HCPCS | Mod: PO | Performed by: ORTHOPAEDIC SURGERY

## 2025-07-01 ASSESSMENT — ENCOUNTER SYMPTOMS
ARTHRALGIAS: 1
WOUND: 0
FATIGUE: 0
CHILLS: 0
COUGH: 0
NAUSEA: 0
VOMITING: 0
SHORTNESS OF BREATH: 0

## 2025-07-01 ASSESSMENT — PAIN - FUNCTIONAL ASSESSMENT: PAIN_FUNCTIONAL_ASSESSMENT: 0-10

## 2025-07-01 NOTE — PROGRESS NOTES
Subjective      Brando Sexton is a 65 y.o. male who presents for f/u left knee.    Chief Complaint   Patient presents with    Left Knee - Pre-op Visit     Pre-op left knee. 7/11/25 LEFT TOTAL KNEE ARTHROPLASTY USING ROBOTIC ORTHOPEDIC SURGICAL ASSISTANT with . Pain 5/10. Pain meds: none.        Mr. Sexton is a 65-year-old male who presents today to follow up on his left knee and is scheduled for total knee arthroplasty on July 11, 2025. Preoperative clearance has been performed by Dr. Gordon. She is on warfarin due to atrial fibrillation and has been instructed to stop taking this on July 4. 2025.       The following have been reviewed and updated as appropriate in this visit:        Past Medical History:   Diagnosis Date    Atrial fibrillation (CMS/HCC)     COVID-19 01/14/2022    Gout     Headache 10/17/2023    Injury of knee 10/17/2023    Pain in joint, shoulder region 01/25/2012    Note: Unchanged    Psoriasis     Sprain of rotator cuff capsule 07/12/2011    Note: Unchanged     Past Surgical History:   Procedure Laterality Date    CARDIOVERSION  03/2010    COLONOSCOPY  01/01/1995    ORTHOPEDIC SURGERY Bilateral     Knee Scope    OTHER SURGICAL HISTORY Right     Orthopedic:Rotator cuff x2 Right shoulder     Family History   Problem Relation Age of Onset    Heart disease Mother     Colon cancer Mother     Heart disease Father         MI at age 87    Other Father         Crohns    Psoriasis Father     Asthma Father     Diabetes type II Father's Sister     Heart disease Father's Brother      Social History     Socioeconomic History    Marital status:    Tobacco Use    Smoking status: Former    Smokeless tobacco: Former   Vaping Use    Vaping status: Never Used   Substance and Sexual Activity    Alcohol use: No    Drug use: Never    Sexual activity: Defer     Social Drivers of Health     Tobacco Use: Medium Risk (7/1/2025)    Patient History     Smoking Tobacco Use: Former     Smokeless  Tobacco Use: Former   Alcohol Use: Not At Risk (12/19/2023)    AUDIT-C     Frequency of Alcohol Consumption: Never     Average Number of Drinks: Patient does not drink     Frequency of Binge Drinking: Patient declined   Financial Resource Strain: Patient Declined (12/19/2023)    Overall Financial Resource Strain (CARDIA)     Difficulty of Paying Living Expenses: Patient declined   Food Insecurity: Patient Declined (12/19/2023)    Hunger Vital Sign     Worried About Running Out of Food in the Last Year: Patient declined     Ran Out of Food in the Last Year: Patient declined   Transportation Needs: No Transportation Needs (12/19/2023)    PRAPARE - Transportation     Lack of Transportation (Medical): No     Lack of Transportation (Non-Medical): No   Physical Activity: Insufficiently Active (12/19/2023)    Exercise Vital Sign     Days of Exercise per Week: 1 day     Minutes of Exercise per Session: 60 min   Stress: No Stress Concern Present (12/19/2023)    [employer] - Occupational Stress Questionnaire     Feeling of Stress : Not at all   Social Connections: Unknown (12/19/2023)    Social Connection and Isolation Panel [NHANES]     Frequency of Communication with Friends and Family: Patient declined     Frequency of Social Gatherings with Friends and Family: Patient declined     Attends Caodaism Services: Patient declined     Active Member of Clubs or Organizations: Patient declined     Attends Club or Organization Meetings: Never     Marital Status:    Housing Stability: Unknown (12/19/2023)    Housing Stability Vital Sign     Unable to Pay for Housing in the Last Year: Patient refused     Unstable Housing in the Last Year: Patient refused   Utilities: Patient Declined (12/19/2023)    [facility] Utilities     Threatened with loss of utilities: Patient refused       Review of Systems   Constitutional:  Negative for chills and fatigue.   Respiratory:  Negative for cough and shortness of  "breath.    Gastrointestinal:  Negative for nausea and vomiting.   Musculoskeletal:  Positive for arthralgias (knee).   Skin:  Negative for rash and wound.   All other systems reviewed and are negative.      Objective   /74 (BP Location: Left arm, Patient Position: Sitting, Cuff Size: Regular Adult)   Ht 1.981 m (6' 6\")   Wt (!) 145.7 kg (321 lb 1.6 oz)   BMI 37.11 kg/m²     Physical Exam  Constitutional:       General: He is not in acute distress.     Appearance: Normal appearance. He is well-developed.   HENT:      Head: Normocephalic and atraumatic.   Eyes:      Extraocular Movements: Extraocular movements intact.      Pupils: Pupils are equal, round, and reactive to light.   Cardiovascular:      Rate and Rhythm: Normal rate. Rhythm irregular.   Pulmonary:      Effort: Pulmonary effort is normal.      Breath sounds: Normal breath sounds. No wheezing.   Musculoskeletal:      Comments: Skin is intact throughout the knee. Compression socks on. Mild venous stasis changes. Small sore distally. Good pulses in the foot but irregular heart beat due to atrial fibrillation.     Skin:     General: Skin is warm and dry.   Neurological:      Mental Status: He is alert and oriented to person, place, and time.       ASSESSMENT AND PLAN     Assessment/Plan   Diagnoses and all orders for this visit:    Primary osteoarthritis of left knee      Mr. Sexton is a 65-year-old male who presents today to follow up on his left knee and is scheduled for BEATRIZ total knee arthroplasty on July 11, 2025 at [facility]. The procedure and recovery were explained to him in detail. Risks, benefits and alternatives were discussed and all his questions were answered. He voiced understanding, and we will proceed with surgery as planned. He will stop taking his warfarin on July 4. 2025. Following surgery, he will need Home Health assistance.       Portions of this note were documented by Thais Daniel as I performed the " exam and collected the information from Brando Sexton. I attest that I have reviewed the information as documented, verified the accuracy of the documentation and added additional information as needed.

## 2025-07-01 NOTE — TELEPHONE ENCOUNTER
Hernan is calling to talk to Dr. Gillespie's nurse about an order he has sent to Medical Equipment for this patient. He can be reached at [phone]

## 2025-07-02 ENCOUNTER — TELEPHONE (OUTPATIENT)
Dept: ORTHOPEDIC SURGERY | Facility: CLINIC | Age: 65
End: 2025-07-02
Payer: MEDICARE

## 2025-07-02 NOTE — TELEPHONE ENCOUNTER
"Hernan with DME needed to verify the length of time that the patient needed the walker. Medicare does rentals for the specific walker ordered and unless it's \"lifetime\" need they have to return it. Due to not knowing how long the patient will need this we adjusted the order.   "

## 2025-07-02 NOTE — PRE-PROCEDURE INSTRUCTIONS
Pre-Surgery Instructions:   Medication Instructions    febuxostat (ULORIC) 80 mg tablet Do not take/administer morning of surgery/procedure    furosemide (LASIX) 20 mg tablet Hold for 24 hours prior to surgery    warfarin (COUMADIN) 4 mg tablet Patient advised by MD to stop on 6 days prior to surgery (date)    warfarin (COUMADIN) 2 mg tablet Patient advised by MD to stop on 6 days prior to surgery (date)    losartan (COZAAR) 25 mg tablet Do not take/administer morning of surgery/procedure    carvediloL (COREG) 6.25 mg tablet Do not take/administer morning of surgery/procedure    spironolactone (ALDACTONE) 25 mg tablet Hold for 24 hours prior to surgery    vitamin E,dl-alpha tocopherol, (VITAMIN E, BULK, MISC) Stop 1 week prior to surgery/procedure    triamcinolone (KENALOG) 0.1 % cream      triamcinolone (KENALOG) 0.1 % ointment     tacrolimus (PROTOPIC) 0.1 % ointment     ascorbic acid (VITAMIN C ORAL) Stop 1 week prior to surgery/procedure       Diet instructions for surgery:  Nothing to eat after Midnight and Clear liquids (no pulp) up to 2 hours before surgery.    Preop questionnaire:  Preop Questionairre  Does patient have physical restrictions or limitations?: No  Has patient had an upper respiratory tract infection in the last 2 weeks?: No  Home Oxygen: No    Preop instructions:  Pre-op Phone Call  Surgery Location Verified: Yes  Instructed to shower within 12 hours: Yes  Instructed to remove/not apply makeup, petroleum products, lotion, powder, scents, or deodorant on the morning of surgery: Yes  Recommend eye glasses for day of surgery, contact lenses must be removed prior to surgery:: Yes  Instructed to remove all jewelry, including piercings, and leave at home.: Yes  Instructed to leave all valuables at home: Yes  Instructed to leave all medications at home: Yes  Instructed to bring a valid photo ID, insurance card and form of payment: Yes  NPO Status Reinforced: Yes  Instruct patient to ensure  transportation is available following surgery/procedure:: Yes  Consider having a caregiver stay with the patient for 24 hours following anesthesia:: N/A    Pre-op assessment and teaching done with patient. Instructed patient the surgery department will call 2 business days prior to surgery with the arrival time. Nothing per mouth (NPO) 2 hours before arrival time, that includes no hard candy or gum. Stop all NSAIDs (Ibuprofen, Motrin, Excedrin, Aleve, Aspirin) the week before surgery. You may take Tylenol as needed for pain the week of surgery. Patient voiced understanding of instruction given today, denies further questions.     Please call surgeons office with any signs of upper respiratory infections (cold and cough)

## 2025-07-07 ENCOUNTER — ANESTHESIA EVENT (OUTPATIENT)
Dept: GASTROENTEROLOGY | Facility: HOSPITAL | Age: 65
End: 2025-07-07
Payer: MEDICARE

## 2025-07-07 ASSESSMENT — ENCOUNTER SYMPTOMS: DYSRHYTHMIAS: 1

## 2025-07-07 NOTE — ANESTHESIA PREPROCEDURE EVALUATION
"Pre-Procedure Assessment    Patient: Brando Sexton, male, 65 y.o.    Ht Readings from Last 1 Encounters:   07/01/25 1.981 m (6' 6\")     Wt Readings from Last 1 Encounters:   07/01/25 (!) 145.7 kg (321 lb 1.6 oz)       Last Vitals  BP      Temp      Pulse     Resp      SpO2      Pain Score         Problem list reviewed and Medical history reviewed           Airway   Mallampati: III  TM distance: >3 FB  Neck ROM: full      Dental          Pulmonary     breath sounds clear to auscultation  (+) sleep apnea    ROS comment: No CPAP/BIPAP  Cardiovascular   Exercise tolerance: good (4-7 METS)  (+) hypertension, dysrhythmias, CHF    ECG reviewed  Rhythm: irregular  Rate: normal  ROS comment: 06/12/2025 EKG  . Atrial fibrillation . Paired ventricular premature complexes . Inferior infarct, old    12/13/2024 EKG  . Atrial fibrillation . Ventricular premature complex . Inferior infarct, old    04/29/2024 EKG  . Atrial fibrillation. Inferior infarct, old    04/07/2025 ECHO    Show Result Comparison   ·  Normal left ventricular size, and systolic function.  EF 52%.  Mild LVH.  ·  The left ventricular wall motion is normal.  ·  Unable to assess left ventricular diastolic function due to atrial fibrillation.  Still mildly tachycardic throughout the study.  ·  Normal left ventricular filling pressure.  ·  The left atrium is severely dilated.  ·  The right ventricle is moderately dilated with normal function.  ·  The right atrium is severely dilated.  ·  Aortic valve sclerosis without significant stenosis.  Mild focal calcifications noted.  ·  Mild regurgitation of the mitral valve is noted.  ·  Mild tricuspid regurgitation.  ·  There is no evidence of pulmonary hypertension.  Estimated RVSP at 35 mmHg.  ·  Normal central venous pressure.  ·  No pericardial effusion.  ·  The ascending aorta is mildly dilated at 4.0 cm.          PE comment: 6/12/2025 EKG:  Atrial fibrillation . Paired ventricular premature complexes . " Inferior infarct, old    4/7/2025 Echo:  ·  Normal left ventricular size, and systolic function.  EF 52%.  Mild LVH.  ·  The left ventricular wall motion is normal.  ·  Unable to assess left ventricular diastolic function due to atrial fibrillation.  Still mildly tachycardic throughout the study.  ·  Normal left ventricular filling pressure.  ·  The left atrium is severely dilated.  ·  The right ventricle is moderately dilated with normal function.  ·  The right atrium is severely dilated.  ·  Aortic valve sclerosis without significant stenosis.  Mild focal calcifications noted.  ·  Mild regurgitation of the mitral valve is noted.  ·  Mild tricuspid regurgitation.  ·  There is no evidence of pulmonary hypertension.  Estimated RVSP at 35 mmHg.  ·  Normal central venous pressure.  ·  No pericardial effusion.  ·  The ascending aorta is mildly dilated at 4.0 cm.                 Mental Status/Neuro/Psych    Pt is alert.      (+) arthritis    GI/Hepatic/Renal    (+) GERD well controlled    Endo/Other - negative ROS     Comments: Patient medically optimized -  Avery Gordon MD at 6/12/2025   Abdominal             Social History     Tobacco Use    Smoking status: Former    Smokeless tobacco: Former   Substance Use Topics    Alcohol use: No      Hematology   WBC   Date Value Ref Range Status   06/12/2025 7.7 3.7 - 9.6 10*3/uL Final     RBC   Date Value Ref Range Status   06/12/2025 4.74 4.10 - 5.80 10*6/uL Final     MCV   Date Value Ref Range Status   06/12/2025 98.7 (H) 82.0 - 97.0 fL Final     Hemoglobin   Date Value Ref Range Status   06/12/2025 15.2 13.2 - 17.2 g/dL Final     Hematocrit   Date Value Ref Range Status   06/12/2025 46.8 38.0 - 50.0 % Final     Platelets   Date Value Ref Range Status   06/12/2025 206 130 - 350 10*3/uL Final      Coagulation   Protime   Date Value Ref Range Status   08/16/2024 26.2 (H) 9.4 - 12.5 SECONDS Final     INR   Date Value Ref Range Status   06/10/2025 2.0 (H) 0.9 - 1.1 Final    08/16/2024 2.4 (H) <=1.1 Final   07/02/2024 2.5 (A) 2.0 - 3.0 Final     Comment:     Lot [id number]  Exp 5/31/25      General Chemistry   Calcium   Date Value Ref Range Status   06/12/2025 9.1 8.6 - 10.3 MG/DL Final     BUN   Date Value Ref Range Status   06/12/2025 32 (H) 7 - 25 mg/dL Final     Creatinine   Date Value Ref Range Status   06/12/2025 1.36 (H) 0.70 - 1.30 mg/dL Final     Glucose   Date Value Ref Range Status   06/12/2025 97 70 - 105 MG/DL Final     Sodium   Date Value Ref Range Status   06/12/2025 141 135 - 145 MMOL/L Final     Potassium   Date Value Ref Range Status   06/12/2025 4.2 3.5 - 5.1 MMOL/L Final     Magnesium   Date Value Ref Range Status   03/19/2024 2.1 1.8 - 2.4 mg/dL Final     CO2   Date Value Ref Range Status   06/12/2025 26 21 - 32 MMOL/L Final     Chloride   Date Value Ref Range Status   06/12/2025 109 (H) 98 - 107 MMOL/L Final     Anesthesia Plan    ASA 3   NPO status reviewed: > 8 hours    Spinal and general         Induction: intravenous       Additional Comments: SRNA introduced and role clearly explained. Pt agrees to receiving care from SRNA.          Anesthetic plan and risks discussed with father.      Plan discussed with CRNA.

## 2025-07-08 DIAGNOSIS — I10 ESSENTIAL HYPERTENSION: ICD-10-CM

## 2025-07-08 NOTE — TELEPHONE ENCOUNTER
No care due was identified.  [facility] Embedded Care Due Messages. Reference number: [id number]. 7/08/2025 10:31:15 AM LAURA

## 2025-07-09 RX ORDER — FUROSEMIDE 20 MG/1
20 TABLET ORAL DAILY
Qty: 90 TABLET | Refills: 1 | Status: SHIPPED | OUTPATIENT
Start: 2025-07-09

## 2025-07-09 NOTE — TELEPHONE ENCOUNTER
Centralized RN/Nurse Triage: *Labs and/or appointment with PCP recently completed. Filled medication per 6 or 12 month protocol.

## 2025-07-11 ENCOUNTER — ANCILLARY PROCEDURE (OUTPATIENT)
Dept: ULTRASOUND IMAGING | Facility: HOSPITAL | Age: 65
End: 2025-07-11
Payer: MEDICARE

## 2025-07-11 ENCOUNTER — ANESTHESIA (OUTPATIENT)
Dept: GASTROENTEROLOGY | Facility: HOSPITAL | Age: 65
End: 2025-07-11
Payer: MEDICARE

## 2025-07-11 ENCOUNTER — HOSPITAL ENCOUNTER (OUTPATIENT)
Facility: HOSPITAL | Age: 65
Discharge: HOME HEALTH | End: 2025-07-12
Attending: ORTHOPAEDIC SURGERY | Admitting: ORTHOPAEDIC SURGERY
Payer: MEDICARE

## 2025-07-11 ENCOUNTER — APPOINTMENT (OUTPATIENT)
Dept: RADIOLOGY | Facility: HOSPITAL | Age: 65
End: 2025-07-11
Payer: MEDICARE

## 2025-07-11 DIAGNOSIS — Z96.652 S/P TOTAL KNEE ARTHROPLASTY, LEFT: ICD-10-CM

## 2025-07-11 DIAGNOSIS — Z96.652 S/P TKR (TOTAL KNEE REPLACEMENT), LEFT: Primary | ICD-10-CM

## 2025-07-11 LAB
INR BLD: 1.2
PROTHROMBIN TIME: 12.8 SECONDS (ref 9.4–12.5)

## 2025-07-11 PROCEDURE — 73560 X-RAY EXAM OF KNEE 1 OR 2: CPT | Mod: LT

## 2025-07-11 PROCEDURE — 27447 TOTAL KNEE ARTHROPLASTY: CPT | Mod: LT | Performed by: ORTHOPAEDIC SURGERY

## 2025-07-11 PROCEDURE — 2580000300 HC RX 258: Performed by: PHYSICIAN ASSISTANT

## 2025-07-11 PROCEDURE — (BLANK) HC RECOVERY PHASE-1 1ST  HOUR ACUITY LEVEL 2: Performed by: ORTHOPAEDIC SURGERY

## 2025-07-11 PROCEDURE — 7100002500 HC EXTENDED RECOVERY PER HOUR

## 2025-07-11 PROCEDURE — 2500000200 HC RX 250 WO HCPCS: Performed by: ORTHOPAEDIC SURGERY

## 2025-07-11 PROCEDURE — 6360000200 HC RX 636 W HCPCS (ALT 250 FOR IP): Mod: JZ | Performed by: ORTHOPAEDIC SURGERY

## 2025-07-11 PROCEDURE — 2580000300 HC RX 258: Performed by: NURSE PRACTITIONER

## 2025-07-11 PROCEDURE — 2500000200 HC RX 250 WO HCPCS: Performed by: NURSE ANESTHETIST, CERTIFIED REGISTERED

## 2025-07-11 PROCEDURE — 85610 PROTHROMBIN TIME: CPT

## 2025-07-11 PROCEDURE — 73560 X-RAY EXAM OF KNEE 1 OR 2: CPT | Mod: 26,LT | Performed by: RADIOLOGY

## 2025-07-11 PROCEDURE — C1713 ANCHOR/SCREW BN/BN,TIS/BN: HCPCS | Performed by: ORTHOPAEDIC SURGERY

## 2025-07-11 PROCEDURE — (BLANK) HC GENERAL ANESTHESIA FACILITY CHARGE EACH ADDITIONAL MIN: Performed by: ORTHOPAEDIC SURGERY

## 2025-07-11 PROCEDURE — (BLANK) HC GENERAL ANESTHESIA FACILITY CHARGE 1ST 15 MIN: Performed by: ORTHOPAEDIC SURGERY

## 2025-07-11 PROCEDURE — 2580000300 HC RX 258: Performed by: ORTHOPAEDIC SURGERY

## 2025-07-11 PROCEDURE — C1776 JOINT DEVICE (IMPLANTABLE): HCPCS | Performed by: ORTHOPAEDIC SURGERY

## 2025-07-11 PROCEDURE — (BLANK) HC OR LEVEL 6 PROC EACH ADDITIONAL MIN: Performed by: ORTHOPAEDIC SURGERY

## 2025-07-11 PROCEDURE — 6360000200 HC RX 636 W HCPCS (ALT 250 FOR IP)

## 2025-07-11 PROCEDURE — 6360000200 HC RX 636 W HCPCS (ALT 250 FOR IP): Performed by: PHYSICIAN ASSISTANT

## 2025-07-11 PROCEDURE — 6360000200 HC RX 636 W HCPCS (ALT 250 FOR IP): Performed by: NURSE ANESTHETIST, CERTIFIED REGISTERED

## 2025-07-11 PROCEDURE — 2500000200 HC RX 250 WO HCPCS: Performed by: NURSE PRACTITIONER

## 2025-07-11 PROCEDURE — 6370000100 HC RX 637 (ALT 250 FOR IP): Performed by: ORTHOPAEDIC SURGERY

## 2025-07-11 PROCEDURE — 2720000000 HC SUPP 272 WO HCPCS: Performed by: ORTHOPAEDIC SURGERY

## 2025-07-11 PROCEDURE — (BLANK) HC OR LEVEL 6 PROC 1ST 15MIN: Performed by: ORTHOPAEDIC SURGERY

## 2025-07-11 PROCEDURE — 2580000300 HC RX 258: Performed by: NURSE ANESTHETIST, CERTIFIED REGISTERED

## 2025-07-11 PROCEDURE — 64447 NJX AA&/STRD FEMORAL NRV IMG: CPT | Performed by: NURSE ANESTHETIST, CERTIFIED REGISTERED

## 2025-07-11 PROCEDURE — 2500000200 HC RX 250 WO HCPCS: Mod: NCMED | Performed by: ORTHOPAEDIC SURGERY

## 2025-07-11 DEVICE — PATELLA ALL POLY CEMENTED 35MM PERSONA: Type: IMPLANTABLE DEVICE | Status: FUNCTIONAL

## 2025-07-11 DEVICE — FEMUR CR CEMENTED SZ 12 LEFT STANDARD: Type: IMPLANTABLE DEVICE | Status: FUNCTIONAL

## 2025-07-11 DEVICE — STEM EXT TAPER 14 X +30 STR PERSONA HYB: Type: IMPLANTABLE DEVICE | Status: FUNCTIONAL

## 2025-07-11 DEVICE — TIBIAL CEMENTED STM RT SZ H L PERSONA: Type: IMPLANTABLE DEVICE | Status: FUNCTIONAL

## 2025-07-11 DEVICE — IMPLANTABLE DEVICE: Type: IMPLANTABLE DEVICE | Status: FUNCTIONAL

## 2025-07-11 DEVICE — CEMENT BONE R 1X40 US: Type: IMPLANTABLE DEVICE | Status: FUNCTIONAL

## 2025-07-11 RX ORDER — LOSARTAN POTASSIUM 25 MG/1
25 TABLET ORAL DAILY
Status: DISCONTINUED | OUTPATIENT
Start: 2025-07-11 | End: 2025-07-12 | Stop reason: HOSPADM

## 2025-07-11 RX ORDER — CALCIUM CARBONATE 200(500)MG
500 TABLET,CHEWABLE ORAL AS NEEDED
Status: DISCONTINUED | OUTPATIENT
Start: 2025-07-11 | End: 2025-07-12 | Stop reason: HOSPADM

## 2025-07-11 RX ORDER — MIDAZOLAM HYDROCHLORIDE 1 MG/ML
INJECTION INTRAMUSCULAR; INTRAVENOUS AS NEEDED
Status: DISCONTINUED | OUTPATIENT
Start: 2025-07-11 | End: 2025-07-11 | Stop reason: SURG

## 2025-07-11 RX ORDER — SPIRONOLACTONE 25 MG/1
12.5 TABLET ORAL DAILY
Status: DISCONTINUED | OUTPATIENT
Start: 2025-07-11 | End: 2025-07-12 | Stop reason: HOSPADM

## 2025-07-11 RX ORDER — METOPROLOL TARTRATE 1 MG/ML
1 INJECTION, SOLUTION INTRAVENOUS EVERY 5 MIN PRN
Status: DISCONTINUED | OUTPATIENT
Start: 2025-07-11 | End: 2025-07-11 | Stop reason: HOSPADM

## 2025-07-11 RX ORDER — BUPIVACAINE HYDROCHLORIDE 5 MG/ML
INJECTION, SOLUTION EPIDURAL; INTRACAUDAL; PERINEURAL
Status: COMPLETED | OUTPATIENT
Start: 2025-07-11 | End: 2025-07-11

## 2025-07-11 RX ORDER — OXYCODONE HYDROCHLORIDE 10 MG/1
10 TABLET ORAL EVERY 4 HOURS PRN
Status: DISCONTINUED | OUTPATIENT
Start: 2025-07-11 | End: 2025-07-12 | Stop reason: HOSPADM

## 2025-07-11 RX ORDER — ONDANSETRON 4 MG/1
4 TABLET, FILM COATED ORAL EVERY 6 HOURS PRN
Status: DISCONTINUED | OUTPATIENT
Start: 2025-07-11 | End: 2025-07-12 | Stop reason: HOSPADM

## 2025-07-11 RX ORDER — OXYCODONE HYDROCHLORIDE 5 MG/1
2.5 TABLET ORAL EVERY 4 HOURS PRN
Status: DISCONTINUED | OUTPATIENT
Start: 2025-07-11 | End: 2025-07-12 | Stop reason: HOSPADM

## 2025-07-11 RX ORDER — SODIUM CHLORIDE, SODIUM LACTATE, POTASSIUM CHLORIDE, CALCIUM CHLORIDE 600; 310; 30; 20 MG/100ML; MG/100ML; MG/100ML; MG/100ML
100 INJECTION, SOLUTION INTRAVENOUS CONTINUOUS
Status: DISCONTINUED | OUTPATIENT
Start: 2025-07-11 | End: 2025-07-12 | Stop reason: HOSPADM

## 2025-07-11 RX ORDER — HYDROMORPHONE HYDROCHLORIDE 2 MG/ML
0.5 INJECTION, SOLUTION INTRAMUSCULAR; INTRAVENOUS; SUBCUTANEOUS EVERY 5 MIN PRN
Status: DISCONTINUED | OUTPATIENT
Start: 2025-07-11 | End: 2025-07-11 | Stop reason: HOSPADM

## 2025-07-11 RX ORDER — MORPHINE SULFATE 4 MG/ML
3-4 INJECTION, SOLUTION INTRAMUSCULAR; INTRAVENOUS
Status: DISCONTINUED | OUTPATIENT
Start: 2025-07-11 | End: 2025-07-12 | Stop reason: HOSPADM

## 2025-07-11 RX ORDER — ADHESIVE BANDAGE
30 BANDAGE TOPICAL DAILY PRN
Status: DISCONTINUED | OUTPATIENT
Start: 2025-07-11 | End: 2025-07-12 | Stop reason: HOSPADM

## 2025-07-11 RX ORDER — VANCOMYCIN HYDROCHLORIDE 1 G/20ML
INJECTION, POWDER, LYOPHILIZED, FOR SOLUTION INTRAVENOUS AS NEEDED
Status: DISCONTINUED | OUTPATIENT
Start: 2025-07-11 | End: 2025-07-11 | Stop reason: HOSPADM

## 2025-07-11 RX ORDER — FENTANYL CITRATE/PF 50 MCG/ML
50 PLASTIC BAG, INJECTION (ML) INTRAVENOUS EVERY 5 MIN PRN
Status: DISCONTINUED | OUTPATIENT
Start: 2025-07-11 | End: 2025-07-11 | Stop reason: HOSPADM

## 2025-07-11 RX ORDER — TRANEXAMIC ACID 1 G/10ML
1000 INJECTION, SOLUTION INTRAVENOUS ONCE
Status: COMPLETED | OUTPATIENT
Start: 2025-07-11 | End: 2025-07-11

## 2025-07-11 RX ORDER — VANCOMYCIN HYDROCHLORIDE 1 G/20ML
INJECTION, POWDER, LYOPHILIZED, FOR SOLUTION INTRAVENOUS AS NEEDED
Status: DISCONTINUED | OUTPATIENT
Start: 2025-07-11 | End: 2025-07-11 | Stop reason: SURG

## 2025-07-11 RX ORDER — DEXAMETHASONE SODIUM PHOSPHATE 10 MG/ML
INJECTION, SOLUTION INTRA-ARTICULAR; INTRALESIONAL; INTRAMUSCULAR; INTRAVENOUS; SOFT TISSUE
Status: COMPLETED | OUTPATIENT
Start: 2025-07-11 | End: 2025-07-11

## 2025-07-11 RX ORDER — BUPIVACAINE 13.3 MG/ML
20 INJECTION, SUSPENSION, LIPOSOMAL INFILTRATION ONCE
Status: COMPLETED | OUTPATIENT
Start: 2025-07-11 | End: 2025-07-11

## 2025-07-11 RX ORDER — OXYCODONE HYDROCHLORIDE 5 MG/1
5 TABLET ORAL EVERY 4 HOURS PRN
Status: DISCONTINUED | OUTPATIENT
Start: 2025-07-11 | End: 2025-07-12 | Stop reason: HOSPADM

## 2025-07-11 RX ORDER — ACETAMINOPHEN 500 MG
1000 TABLET ORAL EVERY 8 HOURS SCHEDULED
Status: DISCONTINUED | OUTPATIENT
Start: 2025-07-11 | End: 2025-07-12 | Stop reason: HOSPADM

## 2025-07-11 RX ORDER — DIPHENHYDRAMINE HYDROCHLORIDE 50 MG/ML
25-50 INJECTION, SOLUTION INTRAMUSCULAR; INTRAVENOUS EVERY 6 HOURS PRN
Status: DISCONTINUED | OUTPATIENT
Start: 2025-07-11 | End: 2025-07-12 | Stop reason: HOSPADM

## 2025-07-11 RX ORDER — FUROSEMIDE 20 MG/1
20 TABLET ORAL DAILY
Status: DISCONTINUED | OUTPATIENT
Start: 2025-07-11 | End: 2025-07-12 | Stop reason: HOSPADM

## 2025-07-11 RX ORDER — DEXAMETHASONE SODIUM PHOSPHATE 4 MG/ML
4 INJECTION, SOLUTION INTRA-ARTICULAR; INTRALESIONAL; INTRAMUSCULAR; INTRAVENOUS; SOFT TISSUE ONCE AS NEEDED
Status: DISCONTINUED | OUTPATIENT
Start: 2025-07-11 | End: 2025-07-11 | Stop reason: HOSPADM

## 2025-07-11 RX ORDER — PROPOFOL 10 MG/ML
INJECTION, EMULSION INTRAVENOUS CONTINUOUS PRN
Status: DISCONTINUED | OUTPATIENT
Start: 2025-07-11 | End: 2025-07-11 | Stop reason: SURG

## 2025-07-11 RX ORDER — ONDANSETRON HYDROCHLORIDE 2 MG/ML
4 INJECTION, SOLUTION INTRAVENOUS ONCE AS NEEDED
Status: DISCONTINUED | OUTPATIENT
Start: 2025-07-11 | End: 2025-07-11 | Stop reason: HOSPADM

## 2025-07-11 RX ORDER — MORPHINE SULFATE 2 MG/ML
1-2 INJECTION, SOLUTION INTRAMUSCULAR; INTRAVENOUS
Status: DISCONTINUED | OUTPATIENT
Start: 2025-07-11 | End: 2025-07-12 | Stop reason: HOSPADM

## 2025-07-11 RX ORDER — POLYETHYLENE GLYCOL (3350) 17 G/17G
17 POWDER, FOR SOLUTION ORAL DAILY
Status: DISCONTINUED | OUTPATIENT
Start: 2025-07-12 | End: 2025-07-12 | Stop reason: HOSPADM

## 2025-07-11 RX ORDER — DIPHENHYDRAMINE HCL 25 MG
25-50 CAPSULE ORAL EVERY 6 HOURS PRN
Status: DISCONTINUED | OUTPATIENT
Start: 2025-07-11 | End: 2025-07-12 | Stop reason: HOSPADM

## 2025-07-11 RX ORDER — FENTANYL CITRATE/PF 50 MCG/ML
PLASTIC BAG, INJECTION (ML) INTRAVENOUS AS NEEDED
Status: DISCONTINUED | OUTPATIENT
Start: 2025-07-11 | End: 2025-07-11 | Stop reason: SURG

## 2025-07-11 RX ORDER — FEBUXOSTAT 40 MG/1
80 TABLET, FILM COATED ORAL DAILY
Status: DISCONTINUED | OUTPATIENT
Start: 2025-07-11 | End: 2025-07-12 | Stop reason: HOSPADM

## 2025-07-11 RX ORDER — WARFARIN 2 MG/1
4 TABLET ORAL
Status: DISCONTINUED | OUTPATIENT
Start: 2025-07-12 | End: 2025-07-12

## 2025-07-11 RX ORDER — CELECOXIB 200 MG/1
200 CAPSULE ORAL ONCE
Status: DISCONTINUED | OUTPATIENT
Start: 2025-07-11 | End: 2025-07-11 | Stop reason: HOSPADM

## 2025-07-11 RX ORDER — CLINDAMYCIN PHOSPHATE 900 MG/50ML
900 INJECTION, SOLUTION INTRAVENOUS ONCE
Status: COMPLETED | OUTPATIENT
Start: 2025-07-11 | End: 2025-07-11

## 2025-07-11 RX ORDER — DIPHENHYDRAMINE HYDROCHLORIDE 50 MG/ML
25 INJECTION, SOLUTION INTRAMUSCULAR; INTRAVENOUS ONCE AS NEEDED
Status: DISCONTINUED | OUTPATIENT
Start: 2025-07-11 | End: 2025-07-11 | Stop reason: HOSPADM

## 2025-07-11 RX ORDER — ACETAMINOPHEN 500 MG
1000 TABLET ORAL ONCE
Status: COMPLETED | OUTPATIENT
Start: 2025-07-11 | End: 2025-07-11

## 2025-07-11 RX ORDER — DEXAMETHASONE SODIUM PHOSPHATE 4 MG/ML
INJECTION, SOLUTION INTRA-ARTICULAR; INTRALESIONAL; INTRAMUSCULAR; INTRAVENOUS; SOFT TISSUE AS NEEDED
Status: DISCONTINUED | OUTPATIENT
Start: 2025-07-11 | End: 2025-07-11 | Stop reason: SURG

## 2025-07-11 RX ORDER — ONDANSETRON HYDROCHLORIDE 2 MG/ML
4 INJECTION, SOLUTION INTRAVENOUS EVERY 6 HOURS PRN
Status: DISCONTINUED | OUTPATIENT
Start: 2025-07-11 | End: 2025-07-12 | Stop reason: HOSPADM

## 2025-07-11 RX ORDER — WARFARIN 2 MG/1
2 TABLET ORAL
Status: DISCONTINUED | OUTPATIENT
Start: 2025-07-11 | End: 2025-07-12 | Stop reason: HOSPADM

## 2025-07-11 RX ORDER — ONDANSETRON HYDROCHLORIDE 2 MG/ML
INJECTION, SOLUTION INTRAVENOUS AS NEEDED
Status: DISCONTINUED | OUTPATIENT
Start: 2025-07-11 | End: 2025-07-11 | Stop reason: SURG

## 2025-07-11 RX ORDER — CARVEDILOL 6.25 MG/1
6.25 TABLET ORAL 2 TIMES DAILY WITH MEALS
Status: DISCONTINUED | OUTPATIENT
Start: 2025-07-11 | End: 2025-07-12 | Stop reason: HOSPADM

## 2025-07-11 RX ORDER — BISACODYL 10 MG/1
10 SUPPOSITORY RECTAL DAILY PRN
Status: DISCONTINUED | OUTPATIENT
Start: 2025-07-11 | End: 2025-07-12 | Stop reason: HOSPADM

## 2025-07-11 RX ORDER — DEXMEDETOMIDINE HYDROCHLORIDE 4 UG/ML
INJECTION, SOLUTION INTRAVENOUS AS NEEDED
Status: DISCONTINUED | OUTPATIENT
Start: 2025-07-11 | End: 2025-07-11 | Stop reason: SURG

## 2025-07-11 RX ADMIN — BUPIVACAINE HYDROCHLORIDE 20 ML: 5 INJECTION, SOLUTION EPIDURAL; INTRACAUDAL; PERINEURAL at 09:10

## 2025-07-11 RX ADMIN — DEXMEDETOMIDINE HYDROCHLORIDE IN 0.9% SODIUM CHLORIDE 4 MCG: 4 INJECTION INTRAVENOUS at 09:49

## 2025-07-11 RX ADMIN — SODIUM CHLORIDE, POTASSIUM CHLORIDE, SODIUM LACTATE AND CALCIUM CHLORIDE: 600; 310; 30; 20 INJECTION, SOLUTION INTRAVENOUS at 09:36

## 2025-07-11 RX ADMIN — ONDANSETRON 4 MG: 2 INJECTION INTRAMUSCULAR; INTRAVENOUS at 12:16

## 2025-07-11 RX ADMIN — DEXAMETHASONE SODIUM PHOSPHATE 4 MG: 4 INJECTION, SOLUTION INTRAMUSCULAR; INTRAVENOUS at 11:02

## 2025-07-11 RX ADMIN — VANCOMYCIN HYDROCHLORIDE 2000 MG: 2 INJECTION, POWDER, LYOPHILIZED, FOR SOLUTION INTRAVENOUS at 23:40

## 2025-07-11 RX ADMIN — CLINDAMYCIN PHOSPHATE 900 MG: 900 INJECTION, SOLUTION INTRAVENOUS at 10:02

## 2025-07-11 RX ADMIN — DEXAMETHASONE SODIUM PHOSPHATE 10 MG: 10 INJECTION, SOLUTION INTRAMUSCULAR; INTRAVENOUS at 09:10

## 2025-07-11 RX ADMIN — Medication 100 MCG: at 10:02

## 2025-07-11 RX ADMIN — BUPIVACAINE HYDROCHLORIDE 2.5 ML: 5 INJECTION, SOLUTION EPIDURAL; INTRACAUDAL; PERINEURAL at 09:49

## 2025-07-11 RX ADMIN — MIDAZOLAM HYDROCHLORIDE 2 MG: 1 INJECTION, SOLUTION INTRAMUSCULAR; INTRAVENOUS at 09:43

## 2025-07-11 RX ADMIN — PROPOFOL 75 MCG/KG/MIN: 10 INJECTION, EMULSION INTRAVENOUS at 09:51

## 2025-07-11 RX ADMIN — FENTANYL CITRATE 50 MCG: 50 INJECTION INTRAMUSCULAR; INTRAVENOUS at 09:20

## 2025-07-11 RX ADMIN — SODIUM CHLORIDE, SODIUM LACTATE, POTASSIUM CHLORIDE, CALCIUM CHLORIDE 100 ML/HR: 600; 310; 30; 20 INJECTION, SOLUTION INTRAVENOUS at 15:18

## 2025-07-11 RX ADMIN — Medication 1000 MG: at 15:19

## 2025-07-11 RX ADMIN — PHENYLEPHRINE HYDROCHLORIDE 50 MCG/MIN: 10 INJECTION INTRAVENOUS at 10:33

## 2025-07-11 RX ADMIN — TRANEXAMIC ACID 1000 MG: 1 INJECTION, SOLUTION INTRAVENOUS at 13:56

## 2025-07-11 RX ADMIN — Medication 1000 MG: at 22:09

## 2025-07-11 RX ADMIN — Medication 200 MCG: at 10:07

## 2025-07-11 RX ADMIN — Medication 100 MCG: at 10:37

## 2025-07-11 RX ADMIN — Medication 100 MCG: at 10:16

## 2025-07-11 RX ADMIN — LOSARTAN POTASSIUM 25 MG: 25 TABLET, FILM COATED ORAL at 15:19

## 2025-07-11 RX ADMIN — Medication 100 MCG: at 10:25

## 2025-07-11 RX ADMIN — Medication 1000 MG: at 08:46

## 2025-07-11 RX ADMIN — SODIUM CHLORIDE, POTASSIUM CHLORIDE, SODIUM LACTATE AND CALCIUM CHLORIDE 100 ML/HR: 600; 310; 30; 20 INJECTION, SOLUTION INTRAVENOUS at 17:01

## 2025-07-11 RX ADMIN — TRANEXAMIC ACID 1000 MG: 100 INJECTION, SOLUTION INTRAVENOUS at 09:57

## 2025-07-11 RX ADMIN — FEBUXOSTAT TABLETS 40 MG 80 MG: 40 FILM ORAL at 15:19

## 2025-07-11 RX ADMIN — VANCOMYCIN HYDROCHLORIDE 2 G: 1 INJECTION, POWDER, LYOPHILIZED, FOR SOLUTION INTRAVENOUS at 10:57

## 2025-07-11 RX ADMIN — Medication 200 MCG: at 10:33

## 2025-07-11 RX ADMIN — Medication 200 MCG: at 10:28

## 2025-07-11 RX ADMIN — FENTANYL CITRATE 50 MCG: 50 INJECTION INTRAMUSCULAR; INTRAVENOUS at 09:00

## 2025-07-11 RX ADMIN — MIDAZOLAM HYDROCHLORIDE 2 MG: 1 INJECTION, SOLUTION INTRAMUSCULAR; INTRAVENOUS at 09:00

## 2025-07-11 ASSESSMENT — ENCOUNTER SYMPTOMS: EXERCISE TOLERANCE: GOOD (4-7 METS)

## 2025-07-11 NOTE — ANESTHESIA POSTPROCEDURE EVALUATION
Patient: Brando Sexton    Procedure Summary       Date: 07/11/25 Room / Location: [facility] OR 01 / [facility]    Anesthesia Start: 0936 Anesthesia Stop: 1346    Procedure: LEFT TOTAL KNEE ARTHROPLASTY USING ROBOTIC ORTHOPEDIC SURGICAL ASSISTANT (Left: Knee) Diagnosis:       Primary osteoarthritis of left knee      (Primary osteoarthritis of left knee [M17.12])    Surgeons: Zane Gillespie MD Responsible Provider: Wm Schmidt CRNA    Anesthesia Type: spinal, MAC ASA Status: 3            Anesthesia Type: spinal, MAC    Last vitals   Vitals Value Taken Time   /66 07/11/25 13:45   Temp     Pulse 70 07/11/25 13:45   Resp 22 07/11/25 13:45   SpO2 94 % 07/11/25 13:45   Pain Score         Anesthesia Post Evaluation    Patient location during evaluation: PACU  Patient participation: complete - patient participated  Level of consciousness: awake and alert  Pain management: adequate  Airway patency: patent  Cardiovascular status: acceptable  Respiratory status: acceptable  Hydration status: acceptable  May dismiss recovered patient based on consultation with the appropriate physicians and/or meeting appropriate discharge criteria     There were no known notable events for this encounter.    Cosmetic?  This procedure is not cosmetic.

## 2025-07-11 NOTE — DISCHARGE INSTR - APPOINTMENTS
Please contact your Primary Care Physician with any medical questions/concerns not related to your knee surgery.     Home Health will contact you once you are home to verify your address and let you know when you can expect someone to visit.    When Home Health indicates you are ready to transition to outpatient therapy, Dr Gillespie's office ([phone]) needs to be notified so a referral can be sent to the outpatient therapy company you will continue physical therapy with.

## 2025-07-11 NOTE — DISCHARGE INSTRUCTIONS
Home Instructions:    -Take oral Coumadin per your normal routine after surgery for prevention of blood clots. You should take your 4 mg dose of Coumadin at 4 pm on 7/12/25 and then resume your normal schedule after that. You should have your PT/INR re-checked on Monday, July 14.   -Take oral Oxycodone 5 mg 1 tablet every 4-6 hours by mouth for severe pain relief in your operative knee.  -Take oral Tylenol 500 mg 1-2 tablets every 8 hours as needed for mild/moderate pain relief in your operative knee. Do not exceed 3,000 mg of Tylenol in 24 hours.   -Use your thigh-high compression hose at all times other than showering and changing of clothes to prevent blood clots for 4 weeks after surgery.  -Use your 2 wheeled walker to help with ambulation and for pain relief of your operative knee.  -Use your ice pack/machine to help reduce swelling and pain in the operative knee as needed.  -If you develop any fevers, chills, night sweats, increased or progressive redness, warmth, swelling, or drainage from your incision, call [facility] at [phone].      ~~Self-administer Nozin in each nostril 2 times a day for 5 days post-operatively, see tip sheet for instructions.     Wound Care Instructions:    -Keep your wound dry and clean over your operative joint.  You have a waterproof dressing over the incision.  You may shower over this dressing.  -If your current bandage remains clean and dry, you may leave it in place until your post op appointment. If current bandage becomes saturated, please change the outer dressing while leaving the underlying glue mesh in place.  -Do NOT place any creams, ointments, or lotions over your incision site until your skin glue mesh has been removed at your 2 week postop visit.  -Do NOT submerge your incision in a tub, whirlpool, or bath for 6 weeks after surgery.  -Call [facility] if you have any questions or concerns  regarding your wound care or dressing changes at [phone].        Total Knee Replacement, Care After  You will learn about how to care for yourself after total knee replacement.  To view the content, go to this web address:  [email or web address]    7/11/2025  KAREY HOSE SIZE:   Knee Length: XX Large Long 45.5-51 cm    Total Knee Replacement, care after  These instructions give you information about caring for yourself after your surgery. Your doctor may also give you more specific instructions.  CONTACT YOUR SURGEON at [phone] if:  You experience a temperature greater than 100.5 degrees F.  You experience increased pain not relieved by the narcotic pain medication.  YOU EXPERIENCE REDNESS, SWELLING, DRAINAGE, BLISTERING OR ODOR FROM INCISIONS.  You experience uncontrolled bleeding.  You have increased respiratory distress.  You are having problems with constipation.  You have any other questions regarding your surgery.   CONTACT YOUR PRIMARY CARE PHYSICIAN if:  You have a medical issue /question not related to your knee surgery.    FOR SAFETY:  DO keep your incisions clean and dry to help prevent infection. The dressing stays in place until you are seen in the Orthopedic clinic at the 2 week follow up appointment.  DO NOT apply any creams, lotions, ointments or salves to the incision site.   NO tub baths until the incision is completely healed. You may shower with the dressing in place.       DO Wear the elastic KAREY hose to prevent blood clots.    Stockings are to be worn on both legs during the day and at night. They may be removed to shower and to wash the stockings.    DO walk every hour to help prevent blood clots.    Do NOT use alcohol when taking pain medications.    Do NOT drive until you are no longer taking narcotic pain medications and/or using your walker.    Do NOT schedule dentist appointments within the next 3 months. To help prevent infections you will need to obtain an antibiotic  prescription from your surgeon prior to any dental work/cleaning.      FOR HEALING:    DO your knee exercises twice a day and as instructed by your therapist - refer to the knee guidebook.    DO eat a well- balanced diet. Good nutrition helps your body heal faster.    Do NOT smoke - It slows healing.    DO NOT PLACE A PILLOW UNDER YOUR KNEE - This can cause problems with how far you are able to extend your knee in the future.    FOR COMFORT:    DO use the ice machine for swelling and comfort.    There will be MORE swelling and bruising on days 2-3 than there is on the day after surgery. This is normal. The swelling will decrease with the anti-inflammatory medication, the ice machine, and by keeping your leg elevated when not walking. The swelling will make it more difficult to move your knee. As the swelling goes down, the movement will become easier.  Use the ice machine as much as you can for the first week following your surgery, including at night, then try to use it 4-6 times per day for 20-30 minutes. You should use it after you have had physical therapy or have done your exercises. Use it more frequently if you are having continued pain and swelling.  Always put something between the cold therapy pad and your skin.  Inspect your skin under the pad every 1-2 hours and notify Orthopedics if you experience any adverse reactions such as burning, itching, blisters, increased redness discoloration, welts or other changes in skin appearance.  Be sure the strap is not too tight; it should be as loose as possible to keep the pad in place.  Be sure to have an adequate supply of ice as you will need to refill your cooler approximately every 4 hours. Remember to ensure the correct amount of water is in the cooler to circulate. When restarting the machine allow plenty of time to ensure water has completely circulated through the pad before placing over your knee and securing the straps.   It is very important to read the  instruction booklet that comes with the ice machine.     DO take Pain medication prior to activity and exercise. You will be given a prescription at discharge.    DO walk often.     DO actively prevent constipation. Remember that narcotics cause constipation.    Drink 8-8oz glasses of fluid especially water daily and /or include more moist foods like soups and fresh fruits.  Add Fiber by eating at least 5-9 servings of fruit and vegetables and 3-4 servings of whole grains per day.  Eat 1-2 servings of yogurt with live and active cultures daily.  Get regular exercise (walking).  Use stool softeners and over the counter laxatives as needed.  Contact your provider if you have not had a bowl movement 5 days after surgery.      REMEMBER:    Recuperation takes 6-12 weeks and you may feel weak during this time.  It is normal to have some numbness around your incision.  Expect soreness, swelling and bruising. It will improve over 4-6 weeks.  You may experience a low-grade fever (below 100 degrees F).  Do gradually wean yourself from prescription medications to non-prescription pain relievers such as Tylenol.  Do Transition from the walker to a cane or normal walking when able. Your Physical Therapist will help you determine when that is.

## 2025-07-11 NOTE — ANESTHESIA PROCEDURE NOTES
Airway  Date/Time: 7/11/2025 10:11 AM  Reason: elective    Airway not difficult    General Information and Staff    Patient location during procedure: OR  CRNA: Wm Schmidt CRNA  Other anesthesia staff: Roberta Carty SRNA  Performed: other anesthesia staff     Patient Condition  Indications for airway management: anesthesia and airway protection  Patient position: sniffing  MILS maintained throughout  Sedation level: deep     Final Airway Details    Final airway type: supraglottic airway    Successful airway: Supraglottic airway: Igel 5.  Size: 5   Placement verified by: chest auscultation and capnometry     Additional Comments  Atraumatic insertion, dentition/lips/oral mucosa unchanged from baseline.

## 2025-07-11 NOTE — PERIOPERATIVE NURSING NOTE
Floor RN Jessica and pt. Spouse contacted as to transport to room 124, report given no questions at this time

## 2025-07-11 NOTE — INTERDISCIPLINARY/THERAPY
Pre - Surgery discharge plans         Spoke with Patient and his wife Kena in pre-op to discuss home situation and discharge plan. Jc lives with Kena in their home [place] outside [place], she is supportive and will assist as needed.  Jc's plan is to discharge home, friend [place] will transport. He feels getting to outpatient PT initially will be difficult (he can't get in his wife's smaller vehicle, and she cannot drive his truck) so requests Home Health PT through [facility], I confirmed they have the referral, Jc is aware he should expect a call on Monday from Home Health. has been made.  Patient indicated he has a walker for home use. Patient instructed to contact his PCP if he has any medical issues/questions not related to his knee surgery.

## 2025-07-11 NOTE — ANESTHESIA PROCEDURE NOTES
Spinal Block    Patient location during procedure: OR  Reason for block: primary anesthetic    Staffing  CRNA: Wm Schmidt CRNA  Other anesthesia staff: Roberta Carty SRNA  Performed: other anesthesia staff and CRNA   Preanesthetic Checklist  Completed: patient identified  Spinal Block  Patient position: sitting  Prep: ChloraPrep  Patient monitoring: EKG, blood pressure monitoring and continuous pulse oximetry  Approach: midline  Location: L4-5  Injection technique: single-shot  Procedure: negative aspiration for blood, no paresthesia and positive aspiration for clear CSF  Local infiltration: lidocaine 1%  Needle  Needle type: Quincke   Needle gauge: 22 G  Needle length: 3.5 in      Medications Administered  BUPivacaine (PF) (MARCAINE) injection 0.5% - Intrathecal - intrathecal   2.5 mL - 7/11/2025 9:49:00 AM  Assessment  Events: well tolerated  Additional Notes  Sterile technique, 1 pass.  CSF flow sluggish initially, needle spun counterclockwise 90 degress and normal CSF flow confirmed.

## 2025-07-11 NOTE — INTERDISCIPLINARY/THERAPY
Spiritual Care Services:     07/11/25 5032   Clinical Encounter Type   Referral By:  (Other)   Visited With Patient   Visited With Other Individuals Significant other/spouse  (wife: Kena)   Visit Type Initial;Post- Op   Yarsani Affilation   Affiliated with Rastaf[employer]/K[employer] Group Yes   Current [employer] Affiliation [employer]   Spiritual/Emotional Distress   Level Moderate   Plan of Care   Spiritual Care Plan Initiated Provide supportive presence   Plan of Care Comments and emotional/spiritual support   Spiritual Assessment   Completed by    [place] Beliefs Believes in a Higher Power   Relationships Spouse/Significant Other is a support   Spiritual Interventions   Spiritual/Yarsani Interventions [place]/Ritual provided;Prayer provided   Emotional/Spiritual Interventions Empathic and Reflective listening provided     Provide supportive presence; emotional/spiritual support; empathetic/reflective listening; prayer

## 2025-07-11 NOTE — PLAN OF CARE
Problem: Pain - Adult  Goal: Verbalizes/displays adequate comfort level or baseline comfort level  Outcome: Progressing  Flowsheets (Taken 7/11/2025 1542)  Verbalizes/displays adequate comfort level or baseline comfort level:   Encourage patient to monitor pain and request interventions   Assess pain using the appropriate pain scale   Administer analgesics based on type and severity of pain and evaluate response   Educate/Implement non-pharmacological measures as appropriate and evaluate response     Problem: Infection - Adult  Goal: Absence of infection during hospitalization  Outcome: Progressing  Flowsheets (Taken 7/11/2025 1542)  Absence of infection during hospitalization:   Assess and monitor for signs and symptoms of infection   Monitor all insertion sites/wounds/incisions   Administer medications as ordered     Problem: Daily Care  Goal: Daily care needs are met  Outcome: Progressing  Flowsheets (Taken 7/11/2025 1542)  Daily care needs are met:   Assess and monitor skin integrity   Encourage independent activity per ability   Assist patient with activities of daily living as needed     Problem: Knowledge Deficit  Goal: Patient/family/caregiver demonstrates understanding of disease process, treatment plan, medications, and discharge instructions  Outcome: Progressing  Flowsheets (Taken 7/11/2025 1542)  Patient/family/caregiver demonstrates understanding of disease process, treatment plan, medications, and discharge instructions:   Provide teaching at level of understanding   Complete learning assessment and assess knowledge base     Problem: Discharge Barriers  Goal: Patient's discharge needs are met  Outcome: Progressing  Flowsheets (Taken 7/11/2025 1542)  Patient discharge needs are met:   Assess patient for self-management skills   Identify potential discharge barriers on admission and throughout hospital stay   Involve patient/family/caregiver in discharge planning process

## 2025-07-11 NOTE — INTERDISCIPLINARY/THERAPY
07/11/25 1630   Ambulation 1   Ambulation Distance (meters) 0 meters   Peds ICU Early Mobility   Patient Position In bed     Physical therapy initial evaluation attempted.  Patient continues to have ongoing numbness and tingling due to ongoing effects of spinal block in lower extremities.  Patient reports no steps at his home and has his own bariatric front wheel walker.  Physical therapy to follow-up with initial evaluation and treatment tomorrow.

## 2025-07-11 NOTE — ANESTHESIA PROCEDURE NOTES
Peripheral Block    Patient location during procedure: pre-op    Staffing  CRNA: Wm Schmidt CRNA  Other anesthesia staff: Roberta Carty SRNA  Performed: CRNA and other anesthesia staff   Preanesthetic Checklist  Completed: patient identified, IV checked, site marked, risks and benefits discussed, surgical consent, monitors and equipment checked, pre-op evaluation and timeout performed  Peripheral Block  Patient position: recumbent  Prep: ChloraPrep  Patient monitoring: EKG, blood pressure monitoring and continuous pulse oximetry  Supplemental oxygen: nasal cannula  Block type: femoral triangle block  Laterality: left  Injection technique: single-shot  Procedures: ultrasound guided  Ultrasound image added to chart: yes    Needle  Needle type: SonoPlex   Needle gauge: 21 G  Needle length: 4 in      Medications Administered  BUPivacaine PF (MARCAINE) injection 0.5% - Perpherial nerve block - Peripheral nerve block   20 mL - 7/11/2025 9:10:00 AM  dexamethasone (PF) (DECADRON) injection 10 mg/mL - Perpherial nerve block - Peripheral nerve block   10 mg - 7/11/2025 9:10:00 AM  Assessment  Injection assessment: no apparent complications, negative aspiration for heme, no paresthesia on injection, incremental injection and local visualized surrounding nerve on ultrasound  Paresthesia pain: none  Heart rate change: no  Slow fractionated injection: yesReason for Block: At surgeon's request for acute post-op pain management

## 2025-07-12 ENCOUNTER — HOME HEALTH ADMISSION (OUTPATIENT)
Dept: HOME HEALTH SERVICES | Facility: HOME HEALTH | Age: 65
End: 2025-07-12
Payer: MEDICARE

## 2025-07-12 VITALS
OXYGEN SATURATION: 97 % | WEIGHT: 315 LBS | BODY MASS INDEX: 36.45 KG/M2 | RESPIRATION RATE: 17 BRPM | SYSTOLIC BLOOD PRESSURE: 107 MMHG | HEIGHT: 78 IN | TEMPERATURE: 97.5 F | HEART RATE: 90 BPM | DIASTOLIC BLOOD PRESSURE: 63 MMHG

## 2025-07-12 DIAGNOSIS — Z51.81 ENCOUNTER FOR THERAPEUTIC DRUG MONITORING: Primary | ICD-10-CM

## 2025-07-12 LAB
ANION GAP SERPL CALC-SCNC: 8 MMOL/L (ref 3–11)
BASOPHILS # BLD AUTO: 0.01 10*3/UL
BASOPHILS NFR BLD AUTO: 0.1 % (ref 0–2)
BUN SERPL-MCNC: 43 MG/DL (ref 7–25)
CALCIUM SERPL-MCNC: 8.5 MG/DL (ref 8.6–10.3)
CHLORIDE SERPL-SCNC: 108 MMOL/L (ref 98–107)
CO2 SERPL-SCNC: 21 MMOL/L (ref 21–32)
CREAT SERPL-MCNC: 1.45 MG/DL (ref 0.7–1.3)
EGFRCR SERPLBLD CKD-EPI 2021: 53 ML/MIN/1.73M*2
EOSINOPHIL # BLD AUTO: 0 10*3/UL
EOSINOPHIL NFR BLD AUTO: 0 % (ref 0–3)
ERYTHROCYTE DISTRIBUTION WIDTH STANDARD DEVIATION: 45.7 FL (ref 35.1–43.9)
ERYTHROCYTE [DISTWIDTH] IN BLOOD BY AUTOMATED COUNT: 12.7 % (ref 11.5–15)
GLUCOSE SERPL-MCNC: 139 MG/DL (ref 70–105)
HCT VFR BLD AUTO: 40.3 % (ref 38–50)
HGB BLD-MCNC: 13.4 G/DL (ref 13.2–17.2)
IMMATURE GRANULOCYTES (10*3/UL) LEUKOCYTES IN BLOOD BY AUTOMATED COUNT: 0.04 10*3/UL
IMMATURE GRANULOCYTES/100 LEUKOCYTES IN BLOOD BY AUTOMATED COUNT: 0.3 %
INR BLD: 1.2
LYMPHOCYTES # BLD AUTO: 0.62 10*3/UL
LYMPHOCYTES NFR BLD AUTO: 4.6 % (ref 15–47)
MCH RBC QN AUTO: 32.5 PG (ref 29–34)
MCHC RBC AUTO-ENTMCNC: 33.3 G/DL (ref 32–36)
MCV RBC AUTO: 97.8 FL (ref 82–97)
MONOCYTES # BLD AUTO: 0.53 10*3/UL
MONOCYTES NFR BLD AUTO: 3.9 % (ref 5–13)
NEUTROPHILS # BLD AUTO: 12.22 10*3/UL
NEUTROPHILS NFR BLD AUTO: 91.1 % (ref 46–70)
NRBC (10*3/UL) BY AUTOMATED COUNT: 0 10*3/UL (ref 0–0.1)
NRBC BLD-RTO: 0 /100 WBCS (ref 0–2)
PLATELET # BLD AUTO: 188 10*3/UL (ref 130–350)
PMV BLD AUTO: 10.3 FL (ref 6.9–10.8)
POTASSIUM SERPL-SCNC: 4.2 MMOL/L (ref 3.5–5.1)
PROTHROMBIN TIME: 13 SECONDS (ref 9.4–12.5)
RBC # BLD AUTO: 4.12 10*6/UL (ref 4.1–5.8)
SODIUM SERPL-SCNC: 137 MMOL/L (ref 135–145)
WBC # BLD AUTO: 13.4 10*3/UL (ref 3.7–9.6)

## 2025-07-12 PROCEDURE — 80048 BASIC METABOLIC PNL TOTAL CA: CPT | Performed by: ORTHOPAEDIC SURGERY

## 2025-07-12 PROCEDURE — 97110 THERAPEUTIC EXERCISES: CPT | Mod: GP | Performed by: PHYSICAL THERAPIST

## 2025-07-12 PROCEDURE — 6370000100 HC RX 637 (ALT 250 FOR IP): Performed by: ORTHOPAEDIC SURGERY

## 2025-07-12 PROCEDURE — 85610 PROTHROMBIN TIME: CPT | Performed by: ORTHOPAEDIC SURGERY

## 2025-07-12 PROCEDURE — 97535 SELF CARE MNGMENT TRAINING: CPT | Mod: GO

## 2025-07-12 PROCEDURE — 85025 COMPLETE CBC W/AUTO DIFF WBC: CPT | Performed by: ORTHOPAEDIC SURGERY

## 2025-07-12 PROCEDURE — 97165 OT EVAL LOW COMPLEX 30 MIN: CPT | Mod: GO

## 2025-07-12 PROCEDURE — 99024 POSTOP FOLLOW-UP VISIT: CPT | Performed by: PHYSICIAN ASSISTANT

## 2025-07-12 PROCEDURE — 6370000100 HC RX 637 (ALT 250 FOR IP): Performed by: PHYSICIAN ASSISTANT

## 2025-07-12 PROCEDURE — 7100002500 HC EXTENDED RECOVERY PER HOUR

## 2025-07-12 PROCEDURE — 97116 GAIT TRAINING THERAPY: CPT | Mod: GP | Performed by: PHYSICAL THERAPIST

## 2025-07-12 PROCEDURE — 97161 PT EVAL LOW COMPLEX 20 MIN: CPT | Mod: GP | Performed by: PHYSICAL THERAPIST

## 2025-07-12 PROCEDURE — 36415 COLL VENOUS BLD VENIPUNCTURE: CPT | Performed by: ORTHOPAEDIC SURGERY

## 2025-07-12 RX ORDER — ONDANSETRON 4 MG/1
4 TABLET, FILM COATED ORAL EVERY 6 HOURS PRN
Qty: 20 TABLET | Refills: 0 | Status: SHIPPED | OUTPATIENT
Start: 2025-07-12

## 2025-07-12 RX ORDER — OXYCODONE HYDROCHLORIDE 5 MG/1
5 TABLET ORAL EVERY 4 HOURS PRN
Qty: 40 TABLET | Refills: 0 | Status: SHIPPED | OUTPATIENT
Start: 2025-07-12

## 2025-07-12 RX ORDER — WARFARIN 2 MG/1
2 TABLET ORAL
Status: COMPLETED | OUTPATIENT
Start: 2025-07-12 | End: 2025-07-12

## 2025-07-12 RX ADMIN — CARVEDILOL 6.25 MG: 6.25 TABLET, FILM COATED ORAL at 09:37

## 2025-07-12 RX ADMIN — SPIRONOLACTONE 12.5 MG: 25 TABLET ORAL at 09:37

## 2025-07-12 RX ADMIN — WARFARIN SODIUM 2 MG: 2 TABLET ORAL at 09:37

## 2025-07-12 RX ADMIN — FEBUXOSTAT TABLETS 40 MG 80 MG: 40 FILM ORAL at 09:37

## 2025-07-12 RX ADMIN — Medication 1000 MG: at 05:15

## 2025-07-12 RX ADMIN — LOSARTAN POTASSIUM 25 MG: 25 TABLET, FILM COATED ORAL at 09:37

## 2025-07-12 RX ADMIN — FUROSEMIDE 20 MG: 20 TABLET ORAL at 09:38

## 2025-07-12 NOTE — DISCHARGE SUMMARY
Today's date  07/12/25  Admit date  7/11/2025  Discharge date  No discharge date for patient encounter.    Procedure  Left total knee arthroplasty-Dr. Gillespie    Brief History  Patient is been having years of progressively worsening left knee pain due to severe osteoarthritis in this joint.  They have gotten to the point where there are ADLs, exercise, recreational activities, walking, sleep and work activities have been significantly diminished due to their symptoms.  They have tried and failed various conservative measures including over-the-counter and prescription medications, injections.  For this reason they underwent the total joint arthroplasty      Discharge Disposition   - [facility]  Full Code         Dietary Orders   (From admission, onward)                 Start     Ordered    07/11/25 1504  Diet Regular  Diet effective now        Question Answer Comment   Nutrition Therapy Protocol (Dietitian May Adjust Diet and Nourishments) Yes    Diet type Regular        07/11/25 1504                    Consults:    DIET: Normal    Hospital Summary  Patient was admitted to the surgical floor after surgery for postanesthesia care, monitoring vital signs, pain control, physical and Occupational Therapy, wound care.  During patient's stay there vital signs remained stable, physical and occupational therapy goals were met, good pain control was achieved with oral pain medication.  Surgical incision was without any signs of infection, no drainage no erythema.    Outpatient Follow-Up  Future Appointments   Date Time Provider Department Center   7/22/2025  9:00 AM [facility] ANTICOAG [facility] ANTIC SP   7/24/2025 10:40 AM Anca France PA-C [facility]   8/19/2025  9:20 AM Anca France PA-C [facility]   9/30/2025  9:40 AM Anca France PA-C [facility]   4/23/2026  9:30 AM Doris Yates MD [facility]       Labs  Lab Results   Component Value Date    WBC 13.4 (H) 07/12/2025    HGB  13.4 07/12/2025    HCT 40.3 07/12/2025    MCV 97.8 (H) 07/12/2025     07/12/2025     Lab Results   Component Value Date    GLUCOSE 139 (H) 07/12/2025    CALCIUM 8.5 (L) 07/12/2025     07/12/2025    K 4.2 07/12/2025    CO2 21 07/12/2025     (H) 07/12/2025    BUN 43 (H) 07/12/2025    CREATININE 1.45 (H) 07/12/2025    ANIONGAP 8 07/12/2025       Ortho Exam    General: 66 y/o male, NAD, no SOB, alert and oriented x 3  Left knee and lower extremity:  Dressing with small amount of sanguinous drainage at distal aspect of wound  Minimal swelling, no ecchymosis  AROM 0-90  Compartments supple, Rachel's negative  Dorsiflexion 5/5  Sensation grossly intact throughout      Home Health Certification Statement    I attest that I or another qualified licensed provider saw Brando Sexton within 90 days prior to or 30 days post admission, and this face-to-face encounter meets the necessary Home Health requirements. The face-to-face encounter occurred on 7/12/2025.     The encounter with the patient was, in whole or in part, for the following medical condition, which is the primary reason for home health care: Pain and symptom control, Development of an in-home therapy program, Educational disease process, In-home safety instruction    Further, I certify that my clinical findings support this patient’s homebound status (i.e., absences from home require considerable and taxing effort, are for health treatment, or for attendance at [employer] events; absences from home for nonmedical reasons are infrequent or of relatively short duration). Homebound criteria are met because: Mobility assist device needed to leave the home, Mobility impairment due to recent fracture, surgery, arthritis or paralysis, and Special transportation needed to leave the home

## 2025-07-12 NOTE — INTERDISCIPLINARY/THERAPY
[address] [place] [address]  [phone]      Inpatient Physical Therapy Evaluation Note    Date of Service: 7/12/2025  Patient Name: Brando Sexton  Referring Provider: GREGORIO HATFIELD  Medicare or Medicaid note, mandy has been added.: Yes  Completed Visit Number: 1  SOC Date: 07/12/25  Certification Period: 07/19/25    Medical Diagnosis:   Primary osteoarthritis of left knee [M17.12]  S/P TKR (total knee replacement), left [Z96.652]  S/P total knee arthroplasty, left [Z96.652]  Treatment Diagnoses:  Treatment Diagnosis: Decreased strength, Decreased range of motion, Decreased mobility    Subjective   Family/Caregiver Present  Family/Caregiver Present: No  Subjective Comments  RN Stated patient is medically cleared for therapy: Yes  Subjective Comments: Patient in bed upon entering room.  Agrees to physical therapy treatment session today.  States that he lives at home with his wife in a ranch style house.  He does not have any stairs to enter or within.  He will be having home health with [facility].  States pain has been manageable and only utilizing Tylenol at this time.   Activities Limited by Patient Complaint  Activities limited by patient complaint: Walking/Mobility, Prolonged standing, ADLs, Recreational activities, Negotiating stairs, Driving, Household/Yard work, Transfers  Social/Occupational/Recreational  Lived With: Spouse  Home Environment: No stairs  Receives Help From: Family  Current Assistive or Adaptive Equipment  Equipment: Standard walker  Pain Assessment Scale  Pain Score: 5-Interrupts some activities  Patient's Goal for Therapy: To be able to return home with wife assist.  Aldair Fall Risk Score: 45    Past Medical History:   Diagnosis Date    Atrial fibrillation (CMS/[facility]ly [facility])     Cardiovascular disease     CHF (congestive heart failure) (CMS/Formerly [facility])     COVID-19 01/14/2022    Dental disease     Dysrhythmia     GERD (gastroesophageal reflux disease)     Gout      Headache 10/17/2023    Hypertension     Injury of knee 10/17/2023    Pain in joint, shoulder region 01/25/2012    Note: Unchanged    Psoriasis     Sleep apnea     Sprain of rotator cuff capsule 07/12/2011    Note: Unchanged       Current Facility-Administered Medications:     LR infusion, 100 mL/hr, intravenous, Continuous, Elizabeth Sanders CNP, Last Rate: 100 mL/hr at 07/11/25 1713, Rate Verify at 07/11/25 1713    carvediloL (COREG) tablet 6.25 mg, 6.25 mg, oral, 2x daily with meals, Zane Gillespie MD    febuxostat (ULORIC) tablet 80 mg, 80 mg, oral, Daily, Zane Gillespie MD, 80 mg at 07/11/25 1519    furosemide (LASIX) tablet 20 mg, 20 mg, oral, Daily, Zane Gillespie MD    losartan (COZAAR) tablet 25 mg, 25 mg, oral, Daily, Zane Gillespie MD, 25 mg at 07/11/25 1519    spironolactone (ALDACTONE) split tablet 12.5 mg, 12.5 mg, oral, Daily, Zane Gillespie MD    warfarin (COUMADIN) tablet 4 mg, 4 mg, oral, Daily, Zane Gillespie MD    LR infusion, 100 mL/hr, intravenous, Continuous, Zane Gillespie MD, Last Rate: 100 mL/hr at 07/11/25 1700, Restarted at 07/11/25 1700    acetaminophen (TYLENOL) tablet 1,000 mg, 1,000 mg, oral, q8h DANITZA, Zane Gillespie MD, 1,000 mg at 07/12/25 0515    oxyCODONE (ROXICODONE) immediate release tablet 2.5 mg, 2.5 mg, oral, q4h PRN, Zane Gillespie MD    oxyCODONE (ROXICODONE) immediate release tablet 5 mg, 5 mg, oral, q4h PRN, Zane Gillespie MD    morphine injection 1-2 mg, 1-2 mg, intravenous, q2h PRN, Zane Gillespie MD    oxyCODONE immediate release tablet 10 mg, 10 mg, oral, q4h PRN, Zane Gillespie MD    morphine injection 3-4 mg, 3-4 mg, intravenous, q2h PRN, Zane Gillespie MD    diphenhydrAMINE (BENADRYL) tab/cap 25-50 mg, 25-50 mg, oral, q6h PRN **OR** diphenhydrAMINE (BENADRYL) injection 25-50 mg, 25-50 mg, intravenous, q6h PRN, Zane Gillespie MD    polyethylene glycol (GLYCOLAX) powder 17 g, 17 g, oral, Daily, Zane Gillespie MD    magnesium hydroxide  (MILK OF MAGNESIA) 400 mg/5 mL oral suspension 30 mL, 30 mL, oral, Daily PRN, Zane Gillespie MD    bisacodyL (DULCOLAX) suppository 10 mg, 10 mg, rectal, Daily PRN, Zane Gillespie MD    ondansetron (ZOFRAN) tablet 4 mg, 4 mg, oral, q6h PRN **OR** ondansetron (ZOFRAN) injection 4 mg, 4 mg, intravenous, q6h PRN, Zane Gillespie MD    calcium carbonate (TUMS) chewable tablet 500 mg, 500 mg, oral, PRN, Zane Gillespie MD    warfarin (COUMADIN) tablet 2 mg, 2 mg, oral, Once, Anca France PA-C  Allergies   Allergen Reactions    Allopurinol     Cephalexin     Nizoral [Ketoconazole] Other (see comments)     Stinging and burning          Objective    Weight Bearing Precautions: Yes  LLE Weight Bearing: Weight Bearing as Tolerated (WBAT)  Is this a Co-Treatment?: No  Findings:   Bed mobility:  Patient able to perform supine to sitting edge of bed independently.  Patient able to sit edge of bed safely without any loss of balance or instability.  Patient denied any dizziness or lightheadedness.    Transfers:  Sit to stand transfers patient required rocking technique to perform sit to stand transfers from both bed, mat table and chair.  Contact-guard assist was utilized and patient was cued on proper and safe technique.  Initial standing balance with front wheel walker was good with patient denying any dizziness or lightheadedness.    Gait:  Ambulation with front wheel walker with contact-guard assist 2 times at 150 feet with patient cued on step through gait pattern.  Patient did not demonstrate any loss of balance or instability.  He did have 1 bout of reports of dizziness which resolved quickly.  Up-and-down 3 steps with front wheel walker and single rail contact-guard assist.    Range of motion:  Extension 5 degrees  Flexion 80 degrees    Strength:  Patient able to perform long arc quad on left.    Treatment:  Therapeutic activity consisting of assisting patient with lower body dressing.    Therapeutic exercise  consisting of educating patient on exercises he should be performing at home and joint camp book in postoperative section focusing on heel prop and knee flexion to 90 degrees.  Patient able to verbalize understanding.    Gait training consisting of ambulation with front wheel walker 2 x 150 feet with contact-guard assist with verbal cueing for step through gait pattern.  Demonstration of up-and-down 3 steps with front wheel rocker and single rail with step to pattern.  Patient able to demonstrate up-and-down 3 steps with front wheel walker with contact-guard assist with minimal verbal cueing and tactile cueing throughout for technique.      Education Documentation  Mobility training, taught by Katherine Iyer PT at 7/12/2025  7:59 AM.  Learner: Patient  Readiness: Acceptance  Method: Explanation  Response: Verbalizes Understanding    Home exercise program, taught by Katherine Iyer PT at 7/12/2025  7:59 AM.  Learner: Patient  Readiness: Acceptance  Method: Explanation  Response: Verbalizes Understanding    Education Comments  No comments found.      Time Calculation  Start Time: 0712  Stop Time: 0750  Time Calculation (min): 38 min  Therapeutic Interventions (Time Spent in Minutes)  Gait/Mobility: 15  Therapeutic Activity: 5  Therapeutic Exercise: 5       Assessment/Plan   Assessment:  Prior Function Comments: Patient was not utilizing an AD, however was going up and down stairs one at a time with railing as well as with getting into and out of his vehicle with running board. States pain restricted his ability to walk very far.  Current Level of Function: Patient currently utilizing a FWW for ambulation, SBA for bed mobility, CGA for transfers and gait/stairs.  Prognosis: Good for established goals  Barriers to Treatment or Progress: Co-morbidities  Assessment: Patient's status post total knee arthroplasty with Dr. Gillespie on 7/11/2025.  Patient is able to demonstrate independent bed mobility, contact-guard assist  with sit to stand transfers and contact-guard assist with gait and stairs.  Patient has met all inpatient goals and is ready for discharge home today with his wife.  Patient would continue to benefit from skilled physical therapy with home health to continue to progress with knee range of motion, strength and gait.  PT Care Plan (Active)        Physical Therapy        Mobility       Dates: Start:  07/12/25          LTG - Patient will ascend and descend stairs       Dates: Start:  07/12/25    Expected End:  07/19/25       Description: Patient will be able to ascend and descend 3 steps with use of single rail and front wheel walker with contact-guard assist.          LTG - Patient will ambulate household distance       Dates: Start:  07/12/25    Expected End:  07/19/25       Description: Patient will be able to ambulate x 150 feet with front wheel walker with a step through gait pattern so that he is able to return home with wife assist.             TRANSFERS       Dates: Start:  07/12/25          STG - Patient will transfer to and from sit to stand       Dates: Start:  07/12/25    Expected End:  07/16/25       Description: Patient will be able to perform sit to stand transfers with contact-guard assist to standby assist so that he is able to return back home with wife.          STG - Patient will perform bed mobility       Dates: Start:  07/12/25    Expected End:  07/16/25       Description: Patient will be able to be safe and independent with bed mobility so that he is able to return home with wife assist.               Recommendation  Recommendations for Therapy: Continue skilled therapy, Home Health Therapy  Equipment Recommended: Front wheeled walker  Equipment Issued: None   Plan:  Treatment Interventions: Therapeutic Exercise, Therapeutic Activity, Neuromuscular Re-education, Gait training  PT Frequency: 1-2x/day  Treatment duration will be through Certification Period: 07/19/25

## 2025-07-12 NOTE — INTERDISCIPLINARY/THERAPY
[address] [place] [address]  [phone]      Inpatient Occupational Therapy Initial Evaluation Note    Date of Service: 07/12/25  Patient Name: Brando Pettitcalli Sexton  Referring Provider: GREGORIO HATFIELD  Medicare or Medicaid note, mandy has been added.: Yes  SOC Date: 07/12/25  Completed Visit Number: 1  Certification Date: 07/27/25    Medical Diagnosis:    Primary osteoarthritis of left knee [M17.12]  S/P TKR (total knee replacement), left [Z96.652]  S/P total knee arthroplasty, left [Z96.652]  Treatment Diagnoses:  Treatment Diagnosis: Decreased IADLs, Decreased functional mobility    Subjective   Family/Caregiver Present  Family/Caregiver Present: Yes  Caregiver: Spouse  Subjective Comments  RN Stated patient is medically cleared for therapy: Yes  Subjective Comments: Pt. was greeted while sitting up in chair and was agreeable to OT services. Pt's spouse present. He denies need to practice WIS transfer and does have a built in bench and grab bars. Pt. was then left up in chair with leg in extension, polar care and needs.   Activities limited by patient complaint: ADLs, Walking/Mobility, Driving, Transfers  Social/Occupational/Recreational  Lived With: Spouse  Receives Help From: Family  Current Assistive or Adaptive Equipment  Equipment: Front wheeled walker, Reacher, Grab bars, Toilet riser, Sock aid    Patient's Goal for Therapy: Pt. would like to be able to return home.      Past Medical History:   Diagnosis Date    Atrial fibrillation (CMS/Formerly [facility])     Cardiovascular disease     CHF (congestive heart failure) (CMS/Formerly [facility])     COVID-19 01/14/2022    Dental disease     Dysrhythmia     GERD (gastroesophageal reflux disease)     Gout     Headache 10/17/2023    Hypertension     Injury of knee 10/17/2023    Pain in joint, shoulder region 01/25/2012    Note: Unchanged    Psoriasis     Sleep apnea     Sprain of rotator cuff capsule 07/12/2011    Note: Unchanged       Current Facility-Administered  Medications:     LR infusion, 100 mL/hr, intravenous, Continuous, Elizabeth Sanders, CNP, Last Rate: 100 mL/hr at 07/11/25 1713, Rate Verify at 07/11/25 1713    carvediloL (COREG) tablet 6.25 mg, 6.25 mg, oral, 2x daily with meals, Zane Gillespie MD, 6.25 mg at 07/12/25 0937    febuxostat (ULORIC) tablet 80 mg, 80 mg, oral, Daily, Zane Gillespie MD, 80 mg at 07/12/25 0937    furosemide (LASIX) tablet 20 mg, 20 mg, oral, Daily, Zane Gillespie MD, 20 mg at 07/12/25 0938    losartan (COZAAR) tablet 25 mg, 25 mg, oral, Daily, Zane Gillespie MD, 25 mg at 07/12/25 0937    spironolactone (ALDACTONE) split tablet 12.5 mg, 12.5 mg, oral, Daily, Zane Gillespie MD, 12.5 mg at 07/12/25 0937    LR infusion, 100 mL/hr, intravenous, Continuous, Zane Gillespie MD, Last Rate: 100 mL/hr at 07/11/25 1700, Restarted at 07/11/25 1700    acetaminophen (TYLENOL) tablet 1,000 mg, 1,000 mg, oral, q8h DANITZA, Zane Gillespie MD, 1,000 mg at 07/12/25 0515    oxyCODONE (ROXICODONE) immediate release tablet 2.5 mg, 2.5 mg, oral, q4h PRN, Zane Gillespie MD    oxyCODONE (ROXICODONE) immediate release tablet 5 mg, 5 mg, oral, q4h PRN, Zane Gillespie MD    morphine injection 1-2 mg, 1-2 mg, intravenous, q2h PRN, Zane Gillespie MD    oxyCODONE immediate release tablet 10 mg, 10 mg, oral, q4h PRN, Zane Gillespie MD    morphine injection 3-4 mg, 3-4 mg, intravenous, q2h PRN, Zane Gillespie MD    diphenhydrAMINE (BENADRYL) tab/cap 25-50 mg, 25-50 mg, oral, q6h PRN **OR** diphenhydrAMINE (BENADRYL) injection 25-50 mg, 25-50 mg, intravenous, q6h PRN, Zane Gillespie MD    polyethylene glycol (GLYCOLAX) powder 17 g, 17 g, oral, Daily, Zane Gillespie MD    magnesium hydroxide (MILK OF MAGNESIA) 400 mg/5 mL oral suspension 30 mL, 30 mL, oral, Daily PRN, Zane Gillespie MD    bisacodyL (DULCOLAX) suppository 10 mg, 10 mg, rectal, Daily PRN, Zane Gillespie MD    ondansetron (ZOFRAN) tablet 4 mg, 4 mg, oral, q6h PRN **OR** ondansetron  (ZOFRAN) injection 4 mg, 4 mg, intravenous, q6h PRN, Zane Gillespie MD    calcium carbonate (TUMS) chewable tablet 500 mg, 500 mg, oral, PRN, Zane Gillespie MD    warfarin (COUMADIN) tablet 2 mg, 2 mg, oral, Once, Anca France PA-C  Allergies   Allergen Reactions    Allopurinol     Cephalexin     Nizoral [Ketoconazole] Other (see comments)     Stinging and burning          Objective    Precautions  Weight Bearing Precautions: No  Is this a Co-Treatment?: No  Findings:  Transfers: Mod I, denies need to practice WIS transer    Mobility: Mod I    Balance: Mod I    ADLs:  Dressing: Mod I  Grooming: Mod I  Bathing: NT, 24 hours after sx.   Toileting: I  Feeding: I    Upper Extremity Assessment: WNL for functional mobility and ADL's    Home Set Up: WIS, steps, built in bench, SA, LHR, LSHS    Treatment:  -Dressing with instruction on AE. Pt. Was able to demonstrate proper use of SA and he and his wife had a good understanding of how to don Harry Hose.   Education Documentation  ADL training, taught by Gillies, Carrie, OTR at 7/12/2025  9:27 AM.  Learner: Family, Patient  Readiness: Acceptance  Method: Explanation  Response: Verbalizes Understanding, Demonstrated Understanding    Education Comments  No comments found.      Time Calculation  Start Time: 0830  Stop Time: 0910  Time Calculation (min): 40 min  Therapeutic Interventions (Time Spent in Minutes)  ADL Selfcare Home Managment: 30        Assessment/Plan   Assessment:  Level of Function and Prognosis  Prior Function Comments: Pt. was independent in ADL/IADL's and walking without AD prior to surgery.  Current Level of Function: Pt. is currently at a Mod I level with FWW for transfers and gait. Pt. was instructed on AE for dressing and is at a Mod I level for ADL's.  Prognosis: Good for established goals  Barriers to Treatment or Progress: None  Assessment: Pt. was able to complete all ADL's and transfers at Mod I level. Pt's spouse can help with compression  stockings as needed. Pt. has met all goals and will be able to d/c from OT services at this time.  OT Care Plan (Active)        Occupational Therapy        OT Misc       Dates: Start:  07/12/25          LTG - Misc 1       Dates: Start:  07/12/25    Expected End:  07/27/25       Description: Pt. Will be able to complete ADL's at Mod I level.           LT - Misc 2       Dates: Start:  07/12/25    Expected End:  07/27/25       Description: Pt. Will be able to complete functional transfers for ADL's at Mod I level.               Plan:  Plan  Plan for next treatment comment: D/C from OT services  Treatment Interventions: Patient/family training, Self-Care Home Management  OT Frequency: One-time visit  Recommendation  Recommendation: D/C from acute care OT services. Pt. is set up for  PT.  Recommendations for Therapy: No further therapy needed  Equipment Recommended: None  Treatment duration will be through Certification Date: 07/27/25

## 2025-07-12 NOTE — PLAN OF CARE
Problem: Safety Adult - Fall  Goal: Free from fall injury  Outcome: Progressing     Problem: Pain - Adult  Goal: Verbalizes/displays adequate comfort level or baseline comfort level  Outcome: Progressing     Problem: Infection - Adult  Goal: Absence of infection during hospitalization  Outcome: Progressing     Problem: Daily Care  Goal: Daily care needs are met  Outcome: Progressing     Problem: Knowledge Deficit  Goal: Patient/family/caregiver demonstrates understanding of disease process, treatment plan, medications, and discharge instructions  Outcome: Progressing     Problem: Discharge Barriers  Goal: Patient's discharge needs are met  Outcome: Progressing

## 2025-07-12 NOTE — PLAN OF CARE
Problem: Pain - Adult  Goal: Verbalizes/displays adequate comfort level or baseline comfort level  Outcome: Adequate for Discharge  Flowsheets (Taken 7/12/2025 0830)  Verbalizes/displays adequate comfort level or baseline comfort level:   Encourage patient to monitor pain and request interventions   Administer analgesics based on type and severity of pain and evaluate response   Educate/Implement non-pharmacological measures as appropriate and evaluate response     Problem: Infection - Adult  Goal: Absence of infection during hospitalization  Outcome: Adequate for Discharge  Flowsheets (Taken 7/11/2025 1542)  Absence of infection during hospitalization:   Assess and monitor for signs and symptoms of infection   Monitor all insertion sites/wounds/incisions   Administer medications as ordered     Problem: Daily Care  Goal: Daily care needs are met  Outcome: Adequate for Discharge  Flowsheets (Taken 7/11/2025 1542)  Daily care needs are met:   Assess and monitor skin integrity   Encourage independent activity per ability   Assist patient with activities of daily living as needed     Problem: Knowledge Deficit  Goal: Patient/family/caregiver demonstrates understanding of disease process, treatment plan, medications, and discharge instructions  Outcome: Adequate for Discharge  Flowsheets (Taken 7/11/2025 1542)  Patient/family/caregiver demonstrates understanding of disease process, treatment plan, medications, and discharge instructions:   Provide teaching at level of understanding   Complete learning assessment and assess knowledge base     Problem: Discharge Barriers  Goal: Patient's discharge needs are met  Outcome: Adequate for Discharge  Flowsheets (Taken 7/11/2025 1542)  Patient discharge needs are met:   Assess patient for self-management skills   Identify potential discharge barriers on admission and throughout hospital stay   Involve patient/family/caregiver in discharge planning process

## 2025-07-13 ENCOUNTER — PATIENT OUTREACH (OUTPATIENT)
Dept: FAMILY MEDICINE | Facility: CLINIC | Age: 65
End: 2025-07-13
Payer: MEDICARE

## 2025-07-13 ENCOUNTER — HOME CARE VISIT (OUTPATIENT)
Dept: HOME HEALTH SERVICES | Facility: HOSPICE | Age: 65
End: 2025-07-13
Payer: MEDICARE

## 2025-07-13 ENCOUNTER — HOME CARE VISIT (OUTPATIENT)
Dept: HOME HEALTH SERVICES | Facility: HOME HEALTH | Age: 65
End: 2025-07-13
Payer: MEDICARE

## 2025-07-13 VITALS
OXYGEN SATURATION: 94 % | HEART RATE: 58 BPM | DIASTOLIC BLOOD PRESSURE: 72 MMHG | RESPIRATION RATE: 17 BRPM | SYSTOLIC BLOOD PRESSURE: 112 MMHG | TEMPERATURE: 97.8 F

## 2025-07-13 PROCEDURE — G0299 HHS/HOSPICE OF RN EA 15 MIN: HCPCS | Mod: PPS

## 2025-07-13 PROCEDURE — (BLANK) HH NO-PAY RAP

## 2025-07-13 ASSESSMENT — ENCOUNTER SYMPTOMS
LAST BOWEL MOVEMENT: 67396
PAIN: 1
MUSCLE WEAKNESS: 1
PAIN LOCATION - EXACERBATING FACTORS: WALKING, SITTING TOO LONG
PAIN LOCATION: LEFT KNEE
HIGHEST PAIN SEVERITY IN PAST 24 HOURS: 8/10
PERSON REPORTING PAIN: PATIENT
STOOL FREQUENCY: DAILY
PAIN LOCATION - PAIN FREQUENCY: CONSTANT
PAIN LOCATION - PAIN SEVERITY: 5/10
LOWEST PAIN SEVERITY IN PAST 24 HOURS: 5/10
PAIN SEVERITY GOAL: 2/10
ARTHRALGIAS: 1
CONSTIPATION: 1
PAIN LOCATION - PAIN QUALITY: THROBBING

## 2025-07-13 ASSESSMENT — ACTIVITIES OF DAILY LIVING (ADL)
GROOMING_REQUIRES_ASSISTANCE: 1
BATHING_REQUIRES_ASSISTANCE: 1
TRANSPORTATION_METHOD: REGULAR CAR
ENTERING_EXITING_HOME: STAND BY ASSIST
TOILETING_REQUIRES_ASSISTANCE: 1
CURRENT_FUNCTION: STAND BY ASSIST
OASIS_M1830: 03
DRESSING_REQUIRES_ASSISTANCE: 1
AMBULATION ASSISTANCE: STAND BY ASSIST

## 2025-07-13 NOTE — Clinical Note
Transitional Care Management (TCM) call completed. No appt with PCP. Patient had surgery and has a follow-up with surgery on 7/24/25. No follow-up with the PCP is indicated on d/c summary or AVS.

## 2025-07-13 NOTE — PROGRESS NOTES
Brando Sexton was contacted following recent hospitalization at [facility]. The patient was discharged from the hospital on 7/12/2025 .The Discharge Summary and After Visit Summary were reviewed. The patient's main diagnosis during the hospitalization was Degenerative joint disease of lower leg.  Followup appointment: 7/24/25 ortho. Any additional testing and labs will be discussed at the patient's upcoming post-hospital follow up appointment.    Transitional Care Management Follow Up (most recent)       Transitional Care Management - 07/13/25 0948          OTHER    Date of post hospital outreach 07/13/25     Contact Status Contact done     Speaking with the Patient or Patient's Caregiver? Patient     Is the patient on the Diabetes registry? N     Were patient's home medications changed or did they have any new medications added during the hospitalization? Y     Did someone go over the discharge summary with the patient or the patient's caregiver and discuss the medications listed on it? Y     Does the patient or patient's caregiver have any questions about the medication changes? N     Patient verbalized understanding of when to contact health care provider or when to get help right away? Y     Discharge instructions reviewed with patient or patient's caregiver and all questions answered? Y     Is there a Home Health/DME Plan being enacted? Please note name of HH agency, DME vendor, contacted/received Y   home health,     Does patient have psychosocial issues that might impact their health status? None     Does patient have financial barriers to care? None

## 2025-07-14 ENCOUNTER — OFFICE VISIT (OUTPATIENT)
Dept: URGENT CARE | Facility: CLINIC | Age: 65
End: 2025-07-14
Payer: MEDICARE

## 2025-07-14 ENCOUNTER — ANCILLARY PROCEDURE (OUTPATIENT)
Dept: RADIOLOGY | Facility: CLINIC | Age: 65
End: 2025-07-14
Payer: MEDICARE

## 2025-07-14 ENCOUNTER — LAB (OUTPATIENT)
Dept: LAB | Facility: CLINIC | Age: 65
End: 2025-07-14
Payer: MEDICARE

## 2025-07-14 ENCOUNTER — ANTICOAGULATION VISIT (OUTPATIENT)
Dept: PHARMACY | Facility: HOSPITAL | Age: 65
End: 2025-07-14

## 2025-07-14 VITALS
DIASTOLIC BLOOD PRESSURE: 78 MMHG | OXYGEN SATURATION: 97 % | TEMPERATURE: 97.9 F | RESPIRATION RATE: 18 BRPM | HEART RATE: 78 BPM | SYSTOLIC BLOOD PRESSURE: 130 MMHG

## 2025-07-14 DIAGNOSIS — R10.9 ABDOMINAL PAIN, UNSPECIFIED ABDOMINAL LOCATION: Primary | ICD-10-CM

## 2025-07-14 DIAGNOSIS — I48.19 PERSISTENT ATRIAL FIBRILLATION (CMS/HCC): Primary | Chronic | ICD-10-CM

## 2025-07-14 DIAGNOSIS — Z51.81 ENCOUNTER FOR THERAPEUTIC DRUG MONITORING: ICD-10-CM

## 2025-07-14 LAB
ALBUMIN SERPL-MCNC: 3.4 G/DL (ref 3.5–5.3)
ALP SERPL-CCNC: 83 U/L (ref 45–115)
ALT SERPL-CCNC: 47 U/L (ref 7–52)
ANION GAP SERPL CALC-SCNC: 7 MMOL/L (ref 3–11)
AST SERPL-CCNC: 54 U/L
BACTERIA #/AREA URNS HPF: NORMAL /HPF
BASOPHILS # BLD AUTO: 0.04 10*3/UL
BASOPHILS NFR BLD AUTO: 0.4 % (ref 0–2)
BILIRUB SERPL-MCNC: 1.66 MG/DL (ref 0.2–1.4)
BILIRUB UR QL: NEGATIVE
BUN SERPL-MCNC: 26 MG/DL (ref 7–25)
CALCIUM ALBUM COR SERPL-MCNC: 8.8 MG/DL (ref 8.6–10.3)
CALCIUM SERPL-MCNC: 8.3 MG/DL (ref 8.6–10.3)
CHLORIDE SERPL-SCNC: 107 MMOL/L (ref 98–107)
CLARITY UR: CLEAR
CO2 SERPL-SCNC: 24 MMOL/L (ref 21–32)
COLOR UR: YELLOW
CREAT SERPL-MCNC: 1.19 MG/DL (ref 0.7–1.3)
EGFRCR SERPLBLD CKD-EPI 2021: 67 ML/MIN/1.73M*2
EOSINOPHIL # BLD AUTO: 0.06 10*3/UL
EOSINOPHIL NFR BLD AUTO: 0.6 % (ref 0–3)
ERYTHROCYTE DISTRIBUTION WIDTH STANDARD DEVIATION: 46.2 FL (ref 35.1–43.9)
ERYTHROCYTE [DISTWIDTH] IN BLOOD BY AUTOMATED COUNT: 12.9 % (ref 11.5–15)
GLUCOSE SERPL-MCNC: 118 MG/DL (ref 70–105)
GLUCOSE UR QL: NEGATIVE MG/DL
HCT VFR BLD AUTO: 40.6 % (ref 38–50)
HGB BLD-MCNC: 13.5 G/DL (ref 13.2–17.2)
HGB UR QL: NEGATIVE
IMMATURE GRANULOCYTES (10*3/UL) LEUKOCYTES IN BLOOD BY AUTOMATED COUNT: 0.03 10*3/UL
IMMATURE GRANULOCYTES/100 LEUKOCYTES IN BLOOD BY AUTOMATED COUNT: 0.3 %
INR BLD: 1.5
KETONES UR-MCNC: NEGATIVE MG/DL
LEUKOCYTE ESTERASE UR QL STRIP: NEGATIVE
LIPASE SERPL-CCNC: 12 U/L (ref 11–82)
LYMPHOCYTES # BLD AUTO: 1.06 10*3/UL
LYMPHOCYTES NFR BLD AUTO: 10.3 % (ref 15–47)
MCH RBC QN AUTO: 32.4 PG (ref 29–34)
MCHC RBC AUTO-ENTMCNC: 33.3 G/DL (ref 32–36)
MCV RBC AUTO: 97.4 FL (ref 82–97)
MONOCYTES # BLD AUTO: 1.05 10*3/UL
MONOCYTES NFR BLD AUTO: 10.2 % (ref 5–13)
NEUTROPHILS # BLD AUTO: 8.04 10*3/UL
NEUTROPHILS NFR BLD AUTO: 78.2 % (ref 46–70)
NITRITE UR QL: NEGATIVE
NRBC (10*3/UL) BY AUTOMATED COUNT: 0 10*3/UL (ref 0–0.1)
NRBC BLD-RTO: 0 /100 WBCS (ref 0–2)
PH UR: 6 PH (ref 5–8)
PLATELET # BLD AUTO: 182 10*3/UL (ref 130–350)
PMV BLD AUTO: 10.2 FL (ref 6.9–10.8)
POTASSIUM SERPL-SCNC: 4.2 MMOL/L (ref 3.5–5.1)
PROT SERPL-MCNC: 5.8 G/DL (ref 6–8.3)
PROT UR STRIP-MCNC: NEGATIVE MG/DL
PROTHROMBIN TIME: 16.1 SECONDS (ref 9.4–12.5)
RBC # BLD AUTO: 4.17 10*6/UL (ref 4.1–5.8)
RBC #/AREA URNS HPF: NORMAL /HPF
SODIUM SERPL-SCNC: 138 MMOL/L (ref 135–145)
SP GR UR: 1.02 (ref 1–1.03)
SQUAMOUS #/AREA URNS HPF: NORMAL /HPF
UROBILINOGEN UR-MCNC: 1 MG/DL
WBC # BLD AUTO: 10.3 10*3/UL (ref 3.7–9.6)
WBC #/AREA URNS HPF: NORMAL /HPF

## 2025-07-14 PROCEDURE — 83690 ASSAY OF LIPASE: CPT | Performed by: PHYSICIAN ASSISTANT

## 2025-07-14 PROCEDURE — 85025 COMPLETE CBC W/AUTO DIFF WBC: CPT | Performed by: PHYSICIAN ASSISTANT

## 2025-07-14 PROCEDURE — 36415 COLL VENOUS BLD VENIPUNCTURE: CPT | Performed by: PHYSICIAN ASSISTANT

## 2025-07-14 PROCEDURE — 85610 PROTHROMBIN TIME: CPT

## 2025-07-14 PROCEDURE — 99213 OFFICE O/P EST LOW 20 MIN: CPT | Performed by: PHYSICIAN ASSISTANT

## 2025-07-14 PROCEDURE — 81001 URINALYSIS AUTO W/SCOPE: CPT | Performed by: PHYSICIAN ASSISTANT

## 2025-07-14 PROCEDURE — G0463 HOSPITAL OUTPT CLINIC VISIT: HCPCS | Performed by: PHYSICIAN ASSISTANT

## 2025-07-14 PROCEDURE — 80053 COMPREHEN METABOLIC PANEL: CPT | Performed by: PHYSICIAN ASSISTANT

## 2025-07-14 PROCEDURE — 74019 RADEX ABDOMEN 2 VIEWS: CPT | Mod: 26 | Performed by: INTERNAL MEDICINE

## 2025-07-14 PROCEDURE — 74019 RADEX ABDOMEN 2 VIEWS: CPT

## 2025-07-14 RX ORDER — ONDANSETRON 4 MG/1
4 TABLET, ORALLY DISINTEGRATING ORAL ONCE
Status: COMPLETED | OUTPATIENT
Start: 2025-07-14 | End: 2025-07-14

## 2025-07-14 RX ADMIN — ONDANSETRON 4 MG: 4 TABLET, ORALLY DISINTEGRATING ORAL at 15:14

## 2025-07-14 ASSESSMENT — ENCOUNTER SYMPTOMS
SORE THROAT: 0
ARTHRALGIAS: 1
FEVER: 0
DYSURIA: 0
BACK PAIN: 0
CHILLS: 0
HEMATURIA: 0
NAUSEA: 1
ABDOMINAL PAIN: 0
SHORTNESS OF BREATH: 0
COUGH: 0
PALPITATIONS: 0
WEAKNESS: 1
VOMITING: 1
EYE PAIN: 0
SEIZURES: 0
COLOR CHANGE: 0

## 2025-07-14 NOTE — OP NOTE
Brando Sexton  07/14/25    PREOPERATIVE DIAGNOSIS:  Pre-op Diagnosis      * Primary osteoarthritis of left knee [M17.12]    POSTOPERATIVE DIAGNOSIS:  Post-op Diagnosis     * Primary osteoarthritis of left knee [M17.12]    PROCEDURES:    Procedure(s):  LEFT TOTAL KNEE ARTHROPLASTY USING ROBOTIC ORTHOPEDIC SURGICAL ASSISTANT  2. Intraoperative computer navigation with robotic orthopedic surgical assistant (BEATRIZ)    SURGEON: Surgeon(s):  Zane Gillespie MD      ASSISTANT:        ANESTHETIST:  CRNA: Wm Schmidt CRNA; Lucas Jain CRNA  Student Nurse Anesthetist: Roberta Carty SRNA       Pain Block: For acute post operative pain management    ANESTHESIA TYPE:  General       ESTIMATED BLOOD LOSS:  200 mL    IV FLUIDS:  Please see anesthesia notes.    SPECIMENS:   Order Name Source Comment Collection Info Order Time   PROTIME-INR Blood, Venous  Collected By: Kimberly Londono 7/11/2025  8:55 AM   CBC WITH AUTO DIFFERENTIAL Blood, Venous  Collected By: Kathe Hendrix 7/12/2025 12:30 AM   BASIC METABOLIC PANEL Blood, Venous  Collected By: Kathe Hendrix 7/12/2025 12:30 AM   PROTIME-INR Blood, Venous  Collected By: Kathe Hendrix 7/12/2025 12:30 AM       DRAINS:  * No LDAs found *    IMPLANTS:    Implant Name Type Inv. Item Serial No.  Lot No. LRB No. Used Action   Cement Bone R 1X40 Us - [id number] Implant Cement Bone R 1X40 Us  [employer] [id number] Left 1 Implanted   Cement Bone R 1X40 Us - [id number] Implant Cement Bone R 1X40 Us  [employer] [id number] Left 1 Implanted   Femur Cr Cemented  12 Left Standard - [id number] Implant Femur Cr Cemented Sz 12 Left Standard  [employer] [id number] Left 1 Implanted   Patella All Poly Cemented 35mm Persona - [id number] Joint Patella All Poly Cemented 35mm Persona  [employer] [id number] Left 1 Implanted   Tibial Cemented Stm Rt Sz H L Persona - [id number] Implant Tibial Cemented Stm Rt Sz H L Persona  [employer] [id number] Left 1 Implanted   Stem Ext Taper 14 X +30 Str Persona Hyb - [id number] Joint Stem Ext Taper 14 X +30 Str Persona Hyb  [employer] [id number] Left 1 Implanted   Surface Artic 12mm Psn Mc XLnk 12/Gh - [id number] Joint Surface Artic 12mm Psn Mc XLnk 12/Gh  [employer] [id number] Left 1 Implanted       TOURNIQUET: None      COMPLICATIONS:  None        Jeana Conteh, PRIYANKA,was present from the very beginning to the very end of the case.  He assisted with retraction throughout the case cementing, cement removal, closure wounds, and application dressings.        The operative knee was prepped and draped in the usual sterile fashion.  The patient was given 3g IV Ancef and 1 g of tranexamic acid.  No tourniquet was used throughout the operative procedure.  A midline incision was made and a medial arthrotomy was made in the usual fashion. The patella was everted and the patellar fat pad was removed to aid in visualization.  The anterior portion of the menisci and ACL were removed.  Inspection of the knee joint demonstrated bone-on-bone arthritic changes medially and also some laterally the patellofemoral joint was also bone-on-bone.  The global positioning pins were placed in the distal femur and in the proximal tibia in usual fashion.  From here he went through all the registration points for BEATRIZ.  The robotic arm was then brought in and we aligned the distal femoral cut.  Cut guide was then attached to the distal femur and a distal femoral cut was made.  Confirmation the distal cut was then made using the GPS marker.  From here the robotic arm was brought in and we drilled both holes for the 4-in-1 cut block.  From here anterior and posterior and chamfer cuts were made.    Attention was then turned to the tibia and a perpendicular cut was made to the longitudinal axis of the tibia with the use of the robotic arm.  The collateral ligaments popliteus tendon and posterior cruciate ligament were  protected throughout this cut.  The menisci medially and laterally were removed.  Osteophytes removed osteotomes and curettes.  I checked the longitudinal alignment of the tibial cut after was found to be satisfactory the tibial tray was inserted in a temporary fashion. A drill and punch were used to complete the preparation of the tibial component.  Different tibial bearings were inserted until we had a satisfactory size in both flexion and extension. A medium medial release was performed until we had satisfactory varus valgus stressing at 0 and 20° of flexion.  At 90° of flexion the anterior and posterior drawer look to be appropriate without lifting off the tibial component.    From here attention was turned to the patella was measured to be 27 millimeters thick and a 35 millimeter size patellar button was chosen.  The reamer was then used to ream down the patella so the final thickness was the same as the original thickness.  From here the patella was checked for tracking and was found to be satisfactory.  The femoral component was completed.  The knee was brought out to full extension and the aqua mantis was used to cauterize the blood vessels.  The local anesthetic cocktail using Exparel was then used throughout the knee joint.  The knee was brought up to of flexion and then pulsatile lavage was used to wash knee joint.  The wound was then dried.  Bone cement that contained 1.5 grams of cefuroxime within 2 batches of bone cement was impacted in first the tibia and the tibial component was compacted.  The femoral component was then compacted followed by the patellar component. Irrecept was used multiple times throughout the surgical procedure and then for one minute during cementing.  After 15 minutes, when the cement was completely hard the bearing size was checked again.  I chose a 12 mm medial congruent E poly-bearing.  From here the wound was closed with #1 Vicryl distally.  Deep tissue was closed with 0  Vicryl and 2-0 Vicryl subcutaneous stitches.  Mesh and Dermabond glue were used to close the skin. A sterile dressing was then applied.  The patient was awakened and brought to recovery room and tolerated the  procedure well.    At the beginning of the case the mechanical alignment was found to be 8.5 varus degrees and the flexion was 4.0 degrees.  Intraoperatively the bone to bone mechanical alignment was degrees of 7.5 degrees of flexion with 0.5 degrees of varus.  At the end the case the patient was found to be in 1.0 varus degrees mechanical alignment and 1.5 degrees of flexion    DISPOSITION:  124/124-01    Zane Gillespie MD  Phone Number: [phone]    DATE: 07/14/25      TIME: 2:04 PM     ,[email or web address]

## 2025-07-14 NOTE — PROGRESS NOTES
Subjective      Brando Sexton is a 65 y.o. male who presents for nausea, abdominal distention, feeling unwell    History of Present Illness         Patient is status post left total knee arthroplasty on 7/12/2025.  States that he only took oxycodone immediately after surgery.  Has not been taking any since.  Has been taking Zofran to help with nausea.  Had a bowel movement today.  States that it was a good bowel movement.  States he is urinating well.  He is trying to eat however is not a lot of appetite.  Feels like his abdomen is distended however he is passing gas no fevers.  No real drainage from his knee incision.      The following have been reviewed and updated as appropriate in this visit:   Tobacco  Allergies  Meds  Problems  Med Hx  Surg Hx  Fam Hx         Allergies   Allergen Reactions    Allopurinol     Cephalexin     Nizoral [Ketoconazole] Other (see comments)     Stinging and burning     Current Outpatient Medications   Medication Sig Dispense Refill    acetaminophen (Tylenol Extra Strength) 500 mg tablet Take 1,000 mg by mouth 3 times a day as needed for fever, headaches, pain scale 1-3/10 or pain scale 8-10/10.      polyethylene glycol (Miralax) 17 gram packet Take 17 g by mouth daily.      ondansetron (ZOFRAN) 4 mg tablet Take 1 tablet (4 mg total) by mouth every 6 hours as needed for nausea 20 tablet 0    furosemide (LASIX) 20 mg tablet Take 1 tablet (20 mg total) by mouth daily 90 tablet 1    triamcinolone (KENALOG) 0.1 % cream Apply thin layer over affected areas twice daily for two weeks then twice weekly for maintenance. OK to increase use again for flares. Avoid face, armpits, and groin. 454 g 3    triamcinolone (KENALOG) 0.1 % ointment Apply thin layer over affected areas twice daily for two weeks then twice weekly for maintenance. OK to increase use again for flares. Avoid face, armpits and groin. 454 g 3    tacrolimus (PROTOPIC) 0.1 % ointment Apply thin layers over rash  twice daily as needed. Safe to use on face/genitals. Safe to use daily. 30 g 3    febuxostat (ULORIC) 80 mg tablet Take 1 tablet (80 mg total) by mouth daily 90 tablet 3    warfarin (COUMADIN) 4 mg tablet ALTERNATE 2MG WITH 4MG TABLET BY MOUTH EVERY OTHER DAY 45 tablet 0    warfarin (COUMADIN) 2 mg tablet ALTERNATE  2 MG BY MOUTH WITH 4 MG TABLET EVERY OTHER DAY 45 tablet 0    losartan (COZAAR) 25 mg tablet Take 1 tablet (25 mg total) by mouth daily 90 tablet 3    carvediloL (COREG) 6.25 mg tablet Take 1 tablet (6.25 mg total) by mouth 2 (two) times a day with meals 180 tablet 3    spironolactone (ALDACTONE) 25 mg tablet Take 0.5 tablets (12.5 mg total) by mouth daily 45 tablet 3    vitamin E,dl-alpha tocopherol, (VITAMIN E, BULK, MISC) 1 tablet by miscellaneous route daily      oxyCODONE (ROXICODONE) 5 mg immediate release tablet Take 1 tablet (5 mg total) by mouth every 4 hours as needed for pain scale 8-10/10 Max Daily Amount: 30 mg (Patient not taking: Reported on 7/14/2025) 40 tablet 0     No current facility-administered medications for this visit.     Past Medical History:   Diagnosis Date    Atrial fibrillation (CMS/[facility])     Cardiovascular disease     CHF (congestive heart failure) (CMS/[facility])     COVID-19 01/14/2022    Dental disease     Dysrhythmia     GERD (gastroesophageal reflux disease)     Gout     Headache 10/17/2023    Hypertension     Injury of knee 10/17/2023    Pain in joint, shoulder region 01/25/2012    Note: Unchanged    Psoriasis     Sleep apnea     Sprain of rotator cuff capsule 07/12/2011    Note: Unchanged     Past Surgical History:   Procedure Laterality Date    CARDIOVERSION  03/2010    COLONOSCOPY  01/01/1995    ORTHOPEDIC SURGERY Bilateral     Knee Scope    OTHER SURGICAL HISTORY Right     Orthopedic:Rotator cuff x2 Right shoulder    TOTAL KNEE ARTHROPLASTY Left 7/11/2025    Procedure: LEFT TOTAL KNEE ARTHROPLASTY USING ROBOTIC ORTHOPEDIC SURGICAL ASSISTANT;  Surgeon: Zane Gillespie,  MD;  Location: [facility];  Service: Orthopedics;  Laterality: Left;     Family History   Problem Relation Age of Onset    Heart disease Mother     Colon cancer Mother     Heart disease Father         MI at age 87    Other Father         Crohns    Psoriasis Father     Asthma Father     Diabetes type II Father's Sister     Heart disease Father's Brother      Social History     Socioeconomic History    Marital status:    Tobacco Use    Smoking status: Former    Smokeless tobacco: Former   Vaping Use    Vaping status: Never Used   Substance and Sexual Activity    Alcohol use: No    Drug use: Never    Sexual activity: Defer     Social Drivers of Health     Tobacco Use: Medium Risk (7/14/2025)    Patient History     Smoking Tobacco Use: Former     Smokeless Tobacco Use: Former   Alcohol Use: Not At Risk (12/19/2023)    AUDIT-C     Frequency of Alcohol Consumption: Never     Average Number of Drinks: Patient does not drink     Frequency of Binge Drinking: Patient declined   Financial Resource Strain: Patient Declined (12/19/2023)    Overall Financial Resource Strain (CARDIA)     Difficulty of Paying Living Expenses: Patient declined   Food Insecurity: No Food Insecurity (7/11/2025)    Hunger Vital Sign     Worried About Running Out of Food in the Last Year: Never true     Ran Out of Food in the Last Year: Never true   Transportation Needs: No Transportation Needs (7/13/2025)    OASIS : Transportation     Lack of Transportation (Medical): No     Lack of Transportation (Non-Medical): No     Patient Unable or Declines to Respond: No   Physical Activity: Insufficiently Active (12/19/2023)    Exercise Vital Sign     Days of Exercise per Week: 1 day     Minutes of Exercise per Session: 60 min   Stress: No Stress Concern Present (12/19/2023)    [employer] - Occupational Stress Questionnaire     Feeling of Stress : Not at all   Social Connections: Feeling Socially Integrated (7/13/2025)     OASIS : Social Isolation     Frequency of experiencing loneliness or isolation: Never   Housing Stability: Low Risk  (7/11/2025)    Housing Stability Vital Sign     Unable to Pay for Housing in the Last Year: No     Number of Times Moved in the Last Year: 0     Homeless in the Last Year: No   Utilities: Not At Risk (7/11/2025)    [facility] Utilities     Threatened with loss of utilities: No       Review of Systems   Constitutional:  Negative for chills and fever.   HENT:  Negative for ear pain and sore throat.    Eyes:  Negative for pain and visual disturbance.   Respiratory:  Negative for cough and shortness of breath.    Cardiovascular:  Negative for chest pain and palpitations.   Gastrointestinal:  Positive for nausea and vomiting. Negative for abdominal pain.   Genitourinary:  Negative for dysuria and hematuria.   Musculoskeletal:  Positive for arthralgias (Left knee). Negative for back pain.   Skin:  Negative for color change and rash.   Neurological:  Positive for weakness. Negative for seizures and syncope.   All other systems reviewed and are negative.      Objective   /78 (BP Location: Left arm, Patient Position: Sitting)   Pulse 78   Temp 36.6 °C (97.9 °F) (Temporal)   Resp 18   SpO2 97%     Physical Exam  Vitals and nursing note reviewed.   Constitutional:       Appearance: Normal appearance. He is obese.   HENT:      Head: Normocephalic.      Nose: Nose normal.      Mouth/Throat:      Mouth: Mucous membranes are moist.      Pharynx: Oropharynx is clear.   Cardiovascular:      Rate and Rhythm: Normal rate and regular rhythm.      Heart sounds: Normal heart sounds.   Pulmonary:      Effort: Pulmonary effort is normal.      Breath sounds: Normal breath sounds.   Abdominal:      General: Abdomen is flat. Bowel sounds are normal. There is no distension.      Palpations: Abdomen is soft. There is no mass.      Tenderness: There is no abdominal tenderness.      Hernia: No hernia is present.    Musculoskeletal:      Comments: Left tka with bandage in place, dried blood,    Neurological:      General: No focal deficit present.      Mental Status: He is alert.         Recent Results (from the past 24 hours)   CBC w/auto differential Blood, Venous    Collection Time: 07/14/25  3:14 PM   Result Value Ref Range    WBC 10.3 (H) 3.7 - 9.6 10*3/uL    RBC 4.17 4.10 - 5.80 10*6/uL    Hemoglobin 13.5 13.2 - 17.2 g/dL    Hematocrit 40.6 38.0 - 50.0 %    MCV 97.4 (H) 82.0 - 97.0 fL    MCH 32.4 29.0 - 34.0 pg    MCHC 33.3 32.0 - 36.0 g/dL    RDW-SD 46.2 (H) 35.1 - 43.9 fl    RDW 12.9 11.5 - 15.0 %    Platelets 182 130 - 350 10*3/uL    MPV 10.2 6.9 - 10.8 fL    Neutrophils% 78.2 (H) 46.0 - 70.0 %    Lymphocytes% 10.3 (L) 15.0 - 47.0 %    Monocytes% 10.2 5.0 - 13.0 %    Eosinophils% 0.6 0.0 - 3.0 %    Basophils% 0.4 0.0 - 2.0 %    Immature Granulocyte% 0.3 <1.0 %    ANC (auto diff) 8.04 10*3/uL    Lymphocytes Absolute 1.06 10*3/uL    Monocytes Absolute 1.05 10*3/uL    Eosinophils Absolute 0.06 10*3/uL    Basophils Absolute 0.04 10*3/uL    Immature Granulocytes Absolute 0.03 10*3/uL    nRBC 0 0 - 2 /100 WBCs    nRBC Absolute 0.0 0.0 - 0.1 10*3/uL   Comprehensive metabolic panel Blood, Venous    Collection Time: 07/14/25  3:14 PM   Result Value Ref Range    Sodium 138 135 - 145 MMOL/L    Potassium 4.2 3.5 - 5.1 MMOL/L    Chloride 107 98 - 107 MMOL/L    CO2 24 21 - 32 MMOL/L    Anion Gap 7 3 - 11 MMOL/L    BUN 26 (H) 7 - 25 MG/DL    Creatinine 1.19 0.70 - 1.30 MG/DL    Glucose 118 (H) 70 - 105 MG/DL    Calcium 8.3 (L) 8.6 - 10.3 MG/DL    AST 54 (H) <40 U/L    ALT (SGPT) 47 7 - 52 U/L    Alkaline Phosphatase 83 45 - 115 U/L    Total Protein 5.8 (L) 6.0 - 8.3 g/dL    Albumin 3.4 (L) 3.5 - 5.3 g/dL    Total Bilirubin 1.66 (H) 0.20 - 1.40 mg/dL    Corrected Calcium 8.8 8.6 - 10.3 mg/dL    eGFR 67 >60 mL/min/1.73m*2   Lipase Blood, Venous    Collection Time: 07/14/25  3:14 PM   Result Value Ref Range    Lipase 12 11 - 82 U/L    Protime-INR Blood, Venous    Collection Time: 07/14/25  3:14 PM   Result Value Ref Range    Protime 16.1 (H) 9.4 - 12.5 SECONDS    INR 1.5 (H) <=1.1   Urinalysis, dipstick Urine, Clean Catch    Collection Time: 07/14/25  3:21 PM   Result Value Ref Range    Color, Urine Yellow Yellow    Clarity, Urine Clear Clear    Specific Gravity, Urine 1.020 1.003 - 1.030    Leukocytes, Urine Negative Negative    Nitrite, Urine Negative Negative    Protein, Urine Negative Negative MG/DL    Ketones, Urine Negative Negative MG/DL    Urobilinogen, Urine 1.0 <2.0 mg/dL    Bilirubin, Urine Negative Negative    Blood, Urine Negative Negative    Glucose, Urine Negative Negative MG/DL    pH, Urine 6.0 5.0 - 8.0 PH   Urinalysis, Micro (part 2 of panel) Urine, Clean Catch    Collection Time: 07/14/25  3:21 PM    Specimen: Urine, Clean Catch   Result Value Ref Range    RBC, Urine None seen None seen, 0-2, Negative /HPF    WBC, Urine None seen 0 - 4 /HPF    Squamous Epithelial, Urine None seen None Seen-9 /HPF    Bacteria, Urine None seen None seen, Few /HPF     Details    Reading Physician Reading Date Result Priority   Americo Parker MD  [phone]  7/14/2025      Narrative & Impression  Exam:  DX ABDOMEN 2 VW from 07/14/2025      Clinical History:  Abdominal pain  unspecified abdominal location; abdominal pain post op     Technique: Upright and supine radiographic views of the abdomen are obtained.     Comparison(s):  None     Findings:  The bowel gas pattern is nonobstructive. No pneumatosis or pneumoperitoneum. A calcified structure within the left upper quadrant likely represents a calcified splenic artery aneurysm. This structure measures approximately 4.5 cm. The visualized lung fields are clear. No acute osseous abnormalities.        IMPRESSION:  1.  Nonobstructive bowel gas pattern.  2.  Suspected calcified splenic artery aneurysm measuring up to 4.5 cm.        Exam Ended: 07/14/25 15:11 Last Resulted: 07/14/25 15:39             Assessment/Plan   Assessment/Plan          Patient nonspecifically feels unwell postop.  Has Zofran.  Not taking pain meds.  States he is passing gas.  Will get an x-ray to rule out any kind of constipation, bowel obstruction.  Will get a urine and some lab results.  He was given Zofran here due to some nausea      Testing is unconcerning.  Discussed with patient this could be a side effect of the anesthesia.  He is only a couple days out from this.  He will continue to try to hydrate, patient is having good bowel movements, use Zofran sparingly.  Return as needed and follow-up with PCP and Ortho   Diagnosis Plan   1. Abdominal pain, unspecified abdominal location  CBC w/auto differential Blood, Venous    Comprehensive metabolic panel Blood, Venous    Urinalysis w/microscopic, reflex culture Urine, Clean Catch    X-ray abdomen 2 views AP with upright and or decubitus    Lipase Blood, Venous    ondansetron ODT (ZOFRAN-ODT) disintegrating tablet 4 mg        Patient Instructions   discussed    TONO Gordillo

## 2025-07-14 NOTE — HOME HEALTH
Patient was admitted for left knee arthroplasty.    Co-morbidities include A Fib, CHF, MOE, Felty's Syndrome, HTN, Gout.   Patient was discharged from hospital on 7/12/25 (date).      Procedures/infections during admission: left knee arthroplasty    Patient accepts the following disciplines in the home: Nursing, Physical Therapy and Home Health Aide.  Patient declined the following disciplines in the home:none.    Skilled needs include: Skilled Nursing: disease education, medication management and wound care and Physical Therapy: strengthening and balance, equipment needs & education, endurance, fall safety education, range of motion, pain management, gait training and transfer training   Subjective:  Patient is sitting at [facility] upon nurse arrival. Home Health consents reviewed and signed. Patient accepts SN, PT and HHA. Patient medications reviewed and list updated. Patient is S/P left knee arthroplasty. Has pain 5/10 currently but has not used oxycodone as he is already constipated and does not want to make it worse. Education to use acetaminophen to assist with pain management along with ice machine and elevation. Patient is currently sleeping in recliner in office. Patient is taking miralax for constipation.. BS x 4. Patient is on warfarin and reqeusts to have PT/INR drawn at home while on home health services, and is due for draw this Monday or Tuesday. Will contact PCP office Monday to obtain orders.       Objective clinical findings related to patient's need for care:  VS WNL, Lungs clear, BSx4, Non-removable surgical dressing.    Assessment:     Response to care: cooperative  Progress toward previous goals: patient is progressing with current care/treatment  Potential barriers: co-morbidities  Patient strengths: strong support system/involved caregiver      Education/instructions provided on disease process, Fall/injury prevention related to removal fall/trip hazards and use of assistive device and  Post-op care including incision care, symptom management, surgical precautions, weight bearing status, diet, follow-up instructions and suture/staple care    Education provided to patient, family and caregiver through explanation.  Patient demonstrated acceptance and  verbalizes understanding.    Plan:  Care plan reviewed and appropriate.  Plan for next visit to include providing education.

## 2025-07-14 NOTE — PROGRESS NOTES
Subjective   Brando Sexton is contacted by the anticoagulation clinic for monitoring of his long term warfarin therapy.     Patient recently held warfarin from 7/6-7/10 for a TKA on 7/11. Warfarin was resumed on 7/12. He reports feeling somewhat unwell and nauseous since discharge.      Today Brando reports the following:   Bleeding signs/symptoms: No   Major bleeding event: No  Thrombosis signs/symptoms: No  Thromboembolic event: No  Missed doses: No  Medication changes: No  Dietary changes: No  Bacterial/viral infection: no  Other concerns: No    Current Outpatient Medications   Medication Sig Dispense Refill    acetaminophen (Tylenol Extra Strength) 500 mg tablet Take 1,000 mg by mouth 3 times a day as needed for fever, headaches, pain scale 1-3/10 or pain scale 8-10/10.      polyethylene glycol (Miralax) 17 gram packet Take 17 g by mouth daily.      oxyCODONE (ROXICODONE) 5 mg immediate release tablet Take 1 tablet (5 mg total) by mouth every 4 hours as needed for pain scale 8-10/10 Max Daily Amount: 30 mg (Patient not taking: Reported on 7/14/2025) 40 tablet 0    ondansetron (ZOFRAN) 4 mg tablet Take 1 tablet (4 mg total) by mouth every 6 hours as needed for nausea 20 tablet 0    furosemide (LASIX) 20 mg tablet Take 1 tablet (20 mg total) by mouth daily 90 tablet 1    triamcinolone (KENALOG) 0.1 % cream Apply thin layer over affected areas twice daily for two weeks then twice weekly for maintenance. OK to increase use again for flares. Avoid face, armpits, and groin. 454 g 3    triamcinolone (KENALOG) 0.1 % ointment Apply thin layer over affected areas twice daily for two weeks then twice weekly for maintenance. OK to increase use again for flares. Avoid face, armpits and groin. 454 g 3    tacrolimus (PROTOPIC) 0.1 % ointment Apply thin layers over rash twice daily as needed. Safe to use on face/genitals. Safe to use daily. 30 g 3    febuxostat (ULORIC) 80 mg tablet Take 1 tablet (80 mg total) by  mouth daily 90 tablet 3    warfarin (COUMADIN) 4 mg tablet ALTERNATE 2MG WITH 4MG TABLET BY MOUTH EVERY OTHER DAY 45 tablet 0    warfarin (COUMADIN) 2 mg tablet ALTERNATE  2 MG BY MOUTH WITH 4 MG TABLET EVERY OTHER DAY 45 tablet 0    losartan (COZAAR) 25 mg tablet Take 1 tablet (25 mg total) by mouth daily 90 tablet 3    carvediloL (COREG) 6.25 mg tablet Take 1 tablet (6.25 mg total) by mouth 2 (two) times a day with meals 180 tablet 3    spironolactone (ALDACTONE) 25 mg tablet Take 0.5 tablets (12.5 mg total) by mouth daily 45 tablet 3    vitamin E,dl-alpha tocopherol, (VITAMIN E, BULK, MISC) 1 tablet by miscellaneous route daily       No current facility-administered medications for this visit.         Objective     There were no vitals taken for this visit.    Assessment/Plan      Anticoagulation Summary  As of 2025      INR goal:  2.0-3.0   INR used for dosin.5 (2025)   Full warfarin instructions:  : 6 mg; Otherwise 2 mg every Sun, Tue, Thu; 4 mg all other days   Next INR check:  2025   Priority:  Maintenance    Indications    Persistent atrial fibrillation (CMS/[facility]) [I48.19]                 Anticoagulation Care Providers       Provider Role Specialty Phone number    Avery Gordon MD Referring Family Medicine [phone]            Anticoagulation therapy status: INR is less than goal and dose adjustment required. Patient will take 6 mg today then return to previous dosing regimen (2 mg Sun/Tues/Thur & 4 mg AOD). Patient verbalized understanding regarding new dose, monitoring and INR.    Follow-up INR in 1 week      Please reference Anticoagulation episode for additional documentation.

## 2025-07-15 ENCOUNTER — HOME CARE VISIT (OUTPATIENT)
Dept: HOME HEALTH SERVICES | Facility: HOSPICE | Age: 65
End: 2025-07-15
Payer: MEDICARE

## 2025-07-15 VITALS
SYSTOLIC BLOOD PRESSURE: 118 MMHG | HEART RATE: 80 BPM | TEMPERATURE: 98.1 F | OXYGEN SATURATION: 97 % | DIASTOLIC BLOOD PRESSURE: 66 MMHG | RESPIRATION RATE: 16 BRPM

## 2025-07-15 VITALS
RESPIRATION RATE: 15 BRPM | OXYGEN SATURATION: 95 % | HEART RATE: 72 BPM | DIASTOLIC BLOOD PRESSURE: 72 MMHG | SYSTOLIC BLOOD PRESSURE: 128 MMHG | TEMPERATURE: 97.2 F

## 2025-07-15 PROCEDURE — G0299 HHS/HOSPICE OF RN EA 15 MIN: HCPCS | Mod: PPS

## 2025-07-15 PROCEDURE — G0151 HHCP-SERV OF PT,EA 15 MIN: HCPCS | Mod: PPS | Performed by: GENERAL ACUTE CARE HOSPITAL

## 2025-07-15 SDOH — HEALTH STABILITY: PHYSICAL HEALTH: EXERCISE COMMENTS: ISSUED, EDUCATED PT ON WRITTEN ILLUSTRATED HEP

## 2025-07-15 ASSESSMENT — ENCOUNTER SYMPTOMS
MUSCLE WEAKNESS: 1
PAIN LOCATION - PAIN SEVERITY: 4/10
SUBJECTIVE PAIN PROGRESSION: GRADUALLY IMPROVING
RESTLESSNESS: 1
HIGHEST PAIN SEVERITY IN PAST 24 HOURS: 3/10
PAIN: 1
PAIN SEVERITY GOAL: 0/10
PAIN LOCATION - PAIN FREQUENCY: INTERMITTENT
PERSON REPORTING PAIN: PATIENT
LOWEST PAIN SEVERITY IN PAST 24 HOURS: 0/10
PERSON REPORTING PAIN: PATIENT
NAUSEA: 1
LOWEST PAIN SEVERITY IN PAST 24 HOURS: 4/10
HIGHEST PAIN SEVERITY IN PAST 24 HOURS: 5/10
SUBJECTIVE PAIN PROGRESSION: WAXING AND WANING
PAIN LOCATION - RELIEVING FACTORS: REST, MEDS
PAIN LOCATION - EXACERBATING FACTORS: MOVEMENT, EXERCISES
PAIN: 1
ARTHRALGIAS: 1
PAIN LOCATION: LEFT KNEE
BOWEL PATTERN NORMAL: 1
PAIN LOCATION - PAIN FREQUENCY: CONSTANT
LAST BOWEL MOVEMENT: 67401
PAIN LOCATION - PAIN SEVERITY: 3/10
PAIN LOCATION - PAIN QUALITY: ACHE, THROB
PAIN SEVERITY GOAL: 4/10
PAIN LOCATION - PAIN QUALITY: DULL
PAIN LOCATION: LEFT KNEE

## 2025-07-15 ASSESSMENT — ACTIVITIES OF DAILY LIVING (ADL)
PREPARING MEALS: DEPENDENT
SHOPPING ASSESSED: 1
TRANSPORTATION ASSESSED: 1
TRANSPORTATION_METHOD: FAMILY
TOILETING_REQUIRES_ASSISTANCE: 1
TRANSPORTATION_METHOD: REGULAR CAR
ENTERING_EXITING_HOME: ONE PERSON
HOUSEKEEPING ASSESSED: 1
GROOMING_REQUIRES_ASSISTANCE: 1
TRANSPORTATION_METHOD: FAMILY
AMBULATION_DISTANCE/DURATION_TOLERATED: 80 FEET
ENTERING_EXITING_HOME: STAND BY ASSIST
TRANSPORTATION: DEPENDENT
LAUNDRY: DEPENDENT
LAUNDRY ASSESSED: 1
DRESSING_REQUIRES_ASSISTANCE: 1
LIGHT HOUSEKEEPING: DEPENDENT
TRANSPORTATION_METHOD: REGULAR CAR
CURRENT_FUNCTION: STAND BY ASSIST
BATHING_REQUIRES_ASSISTANCE: 1
SHOPPING: DEPENDENT

## 2025-07-15 NOTE — HOME HEALTH
Subjective:  Discussed POC with AMBER Velasquez and queta Escudero today.   Pt c/o L knee pain and stiffness after recent TKA.  Pt reports has been using his walker and sleeping in his recliner.   Pt reprots having trouble sleeping and request to try and sleep in bed.    Objective clinical findings related to patient's need for care:  See PT eval    PLOF:  Independent    CLOF: Noted decreased ADL's,  knee pain, difficulty walking, LE weakness and high risk for falls.       Assessment:  Response to care: Pt ly TX well and eager to return to PLOF.  Goals: See HHPT goals  Potential barriers: co-morbidities  Patient strengths: motivated and strong support system/involved caregiver  Potential to improve with skilled physical therapy is fair    Education/instructions provided on Home/environmental safety related to fall hazards and use of walker.  Education provided to patient, family and caregiver through explanation and demonstration. Learners displayed acceptance and verbalizes understanding.    Plan:  Need for skilled services: Physical Therapy: strengthening and balance, endurance, fall safety education, range of motion, pain management and gait training 1 times a week for 6 weeks.  Care plan reviewed and appropriate. Plan for next visit continue to progress working towards HHPT goals    Plan of Care Updates:   none

## 2025-07-15 NOTE — HOME HEALTH
Subjective/Objective:  Patient is sleeping in his recliner with headphones on upon my arrival. His wife expressed concerns regarding his motivation and nausea. We discussed the importance of doing his prescribed exercises, eating high protein, healthy food options to help with healing and ensuring his pain is under control. We discussed the proper way to elevate his leg when he is resting and icing it. He verbalizes understanding. His incision is still covered with the surgical dressing. It has a small amount of old sanquineous drainage. His pain is rated at 4/10 today. All other questions/concerns answered throughout visit.    Assessment:  Response to care: open to care    Progress toward previous goals: care/treatment is still necessary    Potential barriers: co-morbidities and low motivation    Patient strengths: strong support system/involved caregiver    Education/instructions provided on Medications purpose and frequency on oxycodone and Tylenol, Nutrition/hydration related to wound healing and skin integrity and Post-op care including incision care, symptom management and diet    Education provided to patient, family and caregiver through explanation. Patient demonstrated acceptance and verbalizes understanding and needs reinforcement.    Plan:  Continued need for skilled services: Skilled Nursing: medication management and wound care    Care plan reviewed and appropriate. Plan for next visit to include performing wound care on Left knee.    Plan of Care Updates:   Plan of Care in development was verbally reviewed with the patient and  included visit schedule, visit frequency and medication schedule.

## 2025-07-16 ENCOUNTER — TELEPHONE (OUTPATIENT)
Dept: ORTHOPEDIC SURGERY | Facility: CLINIC | Age: 65
End: 2025-07-16
Payer: MEDICARE

## 2025-07-16 NOTE — TELEPHONE ENCOUNTER
Had L TKA on 7/11/25     Patient wonders if he was given Oxycodone on Saturday?        Patient concerned about his stomach issues - just a small amount of coffee yesterday and that caused dry heaves / today he has a small amount of banana with the same results.     Would like to discuss with a nurse

## 2025-07-16 NOTE — TELEPHONE ENCOUNTER
Phone call to patient to follow up on side effects / questions regarding Oxycodone. Unable to leave a message, will try back at a later time.

## 2025-07-17 ENCOUNTER — HOSPITAL ENCOUNTER (OUTPATIENT)
Dept: CT IMAGING | Facility: HOSPITAL | Age: 65
Discharge: HOME | End: 2025-07-17
Payer: MEDICARE

## 2025-07-17 ENCOUNTER — OFFICE VISIT (OUTPATIENT)
Dept: FAMILY MEDICINE | Facility: CLINIC | Age: 65
End: 2025-07-17
Payer: MEDICARE

## 2025-07-17 ENCOUNTER — TELEPHONE (OUTPATIENT)
Dept: FAMILY MEDICINE | Facility: CLINIC | Age: 65
End: 2025-07-17

## 2025-07-17 ENCOUNTER — LAB (OUTPATIENT)
Dept: LAB | Facility: CLINIC | Age: 65
End: 2025-07-17
Payer: MEDICARE

## 2025-07-17 ENCOUNTER — HOME CARE VISIT (OUTPATIENT)
Dept: HOME HEALTH SERVICES | Facility: HOSPICE | Age: 65
End: 2025-07-17
Payer: MEDICARE

## 2025-07-17 VITALS
OXYGEN SATURATION: 95 % | TEMPERATURE: 98.4 F | SYSTOLIC BLOOD PRESSURE: 124 MMHG | DIASTOLIC BLOOD PRESSURE: 66 MMHG | HEART RATE: 74 BPM | WEIGHT: 307.4 LBS | BODY MASS INDEX: 35.53 KG/M2 | RESPIRATION RATE: 18 BRPM

## 2025-07-17 DIAGNOSIS — I50.22 CHRONIC SYSTOLIC HEART FAILURE (CMS/HCC): Chronic | ICD-10-CM

## 2025-07-17 DIAGNOSIS — I48.19 PERSISTENT ATRIAL FIBRILLATION (CMS/HCC): Chronic | ICD-10-CM

## 2025-07-17 DIAGNOSIS — I72.8 SPLENIC ARTERY ANEURYSM (CMS/HCC): ICD-10-CM

## 2025-07-17 DIAGNOSIS — I72.8 SPLENIC ARTERY ANEURYSM (CMS/HCC): Primary | ICD-10-CM

## 2025-07-17 DIAGNOSIS — R10.9 ABDOMINAL PAIN, UNSPECIFIED ABDOMINAL LOCATION: Primary | ICD-10-CM

## 2025-07-17 DIAGNOSIS — I10 ESSENTIAL HYPERTENSION: Chronic | ICD-10-CM

## 2025-07-17 DIAGNOSIS — R10.9 ABDOMINAL PAIN, UNSPECIFIED ABDOMINAL LOCATION: ICD-10-CM

## 2025-07-17 DIAGNOSIS — G47.33 MODERATE OBSTRUCTIVE SLEEP APNEA: Chronic | ICD-10-CM

## 2025-07-17 LAB
ALBUMIN SERPL-MCNC: 3.5 G/DL (ref 3.5–5.3)
ALP SERPL-CCNC: 85 U/L (ref 45–115)
ALT SERPL-CCNC: 36 U/L (ref 7–52)
ANION GAP SERPL CALC-SCNC: 8 MMOL/L (ref 3–11)
AST SERPL-CCNC: 34 U/L
BASOPHILS # BLD AUTO: 0.03 10*3/UL
BASOPHILS NFR BLD AUTO: 0.3 % (ref 0–2)
BILIRUB SERPL-MCNC: 2.06 MG/DL (ref 0.2–1.4)
BUN SERPL-MCNC: 23 MG/DL (ref 7–25)
CALCIUM ALBUM COR SERPL-MCNC: 9 MG/DL (ref 8.6–10.3)
CALCIUM SERPL-MCNC: 8.6 MG/DL (ref 8.6–10.3)
CHLORIDE SERPL-SCNC: 101 MMOL/L (ref 98–107)
CO2 SERPL-SCNC: 25 MMOL/L (ref 21–32)
CREAT SERPL-MCNC: 1.18 MG/DL (ref 0.7–1.3)
EGFRCR SERPLBLD CKD-EPI 2021: 68 ML/MIN/1.73M*2
EOSINOPHIL # BLD AUTO: 0.11 10*3/UL
EOSINOPHIL NFR BLD AUTO: 1.2 % (ref 0–3)
ERYTHROCYTE DISTRIBUTION WIDTH STANDARD DEVIATION: 43.8 FL (ref 35.1–43.9)
ERYTHROCYTE [DISTWIDTH] IN BLOOD BY AUTOMATED COUNT: 12.5 % (ref 11.5–15)
FIBRIN D-DIMER (NG/ML) IN PLATELET POOR PLASMA: ABNORMAL NG/MLFEU
GLUCOSE SERPL-MCNC: 114 MG/DL (ref 70–105)
HCT VFR BLD AUTO: 39.2 % (ref 38–50)
HGB BLD-MCNC: 13.5 G/DL (ref 13.2–17.2)
IMMATURE GRANULOCYTES (10*3/UL) LEUKOCYTES IN BLOOD BY AUTOMATED COUNT: 0.03 10*3/UL
IMMATURE GRANULOCYTES/100 LEUKOCYTES IN BLOOD BY AUTOMATED COUNT: 0.3 %
LYMPHOCYTES # BLD AUTO: 1.06 10*3/UL
LYMPHOCYTES NFR BLD AUTO: 11.4 % (ref 15–47)
MCH RBC QN AUTO: 32.8 PG (ref 29–34)
MCHC RBC AUTO-ENTMCNC: 34.4 G/DL (ref 32–36)
MCV RBC AUTO: 95.4 FL (ref 82–97)
MONOCYTES # BLD AUTO: 0.78 10*3/UL
MONOCYTES NFR BLD AUTO: 8.4 % (ref 5–13)
NEUTROPHILS # BLD AUTO: 7.32 10*3/UL
NEUTROPHILS NFR BLD AUTO: 78.4 % (ref 46–70)
NRBC (10*3/UL) BY AUTOMATED COUNT: 0 10*3/UL (ref 0–0.1)
NRBC BLD-RTO: 0 /100 WBCS (ref 0–2)
PLATELET # BLD AUTO: 232 10*3/UL (ref 130–350)
PMV BLD AUTO: 9.7 FL (ref 6.9–10.8)
POTASSIUM SERPL-SCNC: 3.9 MMOL/L (ref 3.5–5.1)
PROT SERPL-MCNC: 6.1 G/DL (ref 6–8.3)
RBC # BLD AUTO: 4.11 10*6/UL (ref 4.1–5.8)
SODIUM SERPL-SCNC: 134 MMOL/L (ref 135–145)
WBC # BLD AUTO: 9.3 10*3/UL (ref 3.7–9.6)

## 2025-07-17 PROCEDURE — 80053 COMPREHEN METABOLIC PANEL: CPT

## 2025-07-17 PROCEDURE — 74174 CTA ABD&PLVS W/CONTRAST: CPT | Performed by: INTERNAL MEDICINE

## 2025-07-17 PROCEDURE — 85379 FIBRIN DEGRADATION QUANT: CPT

## 2025-07-17 PROCEDURE — 74174 CTA ABD&PLVS W/CONTRAST: CPT

## 2025-07-17 PROCEDURE — 99215 OFFICE O/P EST HI 40 MIN: CPT | Performed by: FAMILY MEDICINE

## 2025-07-17 PROCEDURE — 36415 COLL VENOUS BLD VENIPUNCTURE: CPT

## 2025-07-17 PROCEDURE — G2211 COMPLEX E/M VISIT ADD ON: HCPCS | Performed by: FAMILY MEDICINE

## 2025-07-17 PROCEDURE — G0156 HHCP-SVS OF AIDE,EA 15 MIN: HCPCS | Mod: PPS

## 2025-07-17 PROCEDURE — 2550000100 HC RX 255: Performed by: FAMILY MEDICINE

## 2025-07-17 PROCEDURE — G0463 HOSPITAL OUTPT CLINIC VISIT: HCPCS | Performed by: FAMILY MEDICINE

## 2025-07-17 PROCEDURE — 85025 COMPLETE CBC W/AUTO DIFF WBC: CPT

## 2025-07-17 RX ORDER — IOPAMIDOL 755 MG/ML
80 INJECTION, SOLUTION INTRAVASCULAR ONCE
Status: COMPLETED | OUTPATIENT
Start: 2025-07-17 | End: 2025-07-17

## 2025-07-17 RX ADMIN — IOPAMIDOL 80 ML: 755 INJECTION, SOLUTION INTRAVENOUS at 16:15

## 2025-07-17 ASSESSMENT — ENCOUNTER SYMPTOMS
NAUSEA: 1
ABDOMINAL PAIN: 1
DYSURIA: 0
FEVER: 0
POLYPHAGIA: 0
VOMITING: 0
CONSTIPATION: 0
BLOOD IN STOOL: 0
ACTIVITY CHANGE: 0
RECTAL PAIN: 0
DIAPHORESIS: 0
CHILLS: 1
FATIGUE: 1
POLYDIPSIA: 0
DIARRHEA: 1
ARTHRALGIAS: 1
FREQUENCY: 0
PALPITATIONS: 0

## 2025-07-17 ASSESSMENT — PAIN SCALES - PAIN ASSESSMENT IN ADVANCED DEMENTIA (PAINAD)
TOTALSCORE: 0
BREATHING: 0
BODYLANGUAGE: 0
CONSOLABILITY: 0 - NO NEED TO CONSOLE.
FACIALEXPRESSION: 0 - SMILING OR INEXPRESSIVE.
CONSOLABILITY: 0
BODYLANGUAGE: 0 - RELAXED.
NEGVOCALIZATION: 0
NEGVOCALIZATION: 0 - NONE.
FACIALEXPRESSION: 0

## 2025-07-17 NOTE — TELEPHONE ENCOUNTER
Attempted to contact pt. No answer, voicemail personalized, left message letting pt know I did not see where he was given Oxycodone on 7/12 while in the hospital. H was given a antibiotic and warfarin. Requested call back to see how pt is doing.

## 2025-07-17 NOTE — HOME HEALTH
patient mentioned that he has severe nausea and dry heaving in the mornings so bad that he is not able to eat anything.

## 2025-07-17 NOTE — PROGRESS NOTES
Brando Sexton is a 65 y.o. male who presents for:    Abdominal Pain (Saturday after surgery started having nausea and abdominal pain.  Vomited this morning.)        ---------------------------------------------------------------------    ASSESSMENT  AND PLAN 07/17/25:    The total time spent: 24 + 30 = 54 minutes      Dear Brando Sexton,    Please find a copy of my assessment and plan below for your review. A majority was transcribed by the artificial intelligence application called [employer].   Following, you will find a list of diagnoses and orders.    If you have questions or find errors please call my nurse, Ruth, at [phone]. Or, you can send her a nonemergent message through [employer]     JOANNA   Assessment/Plan     Suspected splenic artery aneurysm  A 4.5 cm suspected calcified splenic artery aneurysm was identified on an abdominal x-ray. He presents with nausea and vague abdominal discomfort, which could be related to the aneurysm, although the calcification suggests it may have been present for many years. The aneurysm is not likely causing acute symptoms. A CTA of the abdomen and pelvis is recommended to better assess the size and characteristics of the aneurysm. The aneurysm is the third most common in the body and is often associated with conditions of increased flow such as pregnancy, arterial venous fistulas, malformations, and portal hypertension.  - Order CTA of the abdomen and pelvis  - Refer to cardiology for management of the splenic artery aneurysm    Post-surgical recovery  Recent surgery with associated symptoms of nausea, abdominal discomfort, and chills. He is advised to stay hydrated and is using Tylenol for pain management. No recent use of narcotics. Labs are ordered to assess for anemia and other post-surgical complications. He is encouraged to eat small amounts frequently to avoid stimulating peristalsis.  - Order CBC, CMP, and D-dimer  - Advise hydration with  water and electrolyte solutions  - Monitor for signs of anemia and other complications    Irritable bowel syndrome (IBS)  IBS may be exacerbated by recent surgery. Symptoms include nausea and abdominal discomfort. He is advised to eat small, frequent meals to manage symptoms. Procedures like surgery can exacerbate IBS symptoms due to stress on the body.  - Advise small, frequent meals to manage IBS symptoms    Atrial fibrillation  Chronic atrial fibrillation managed with Coumadin for anticoagulation and carvedilol for rate control. There is a consideration for a Watchman procedure to potentially discontinue Coumadin. Referral to cardiology is placed for evaluation of atrial fibrillation and consideration of a Watchman procedure.  - Refer to cardiology for evaluation of atrial fibrillation and consideration of a Watchman procedure    Chronic systolic heart failure  Chronic systolic heart failure under cardiology care.  - Refer to cardiology for management of chronic systolic heart failure    General Health Maintenance  He is advised to stay active to reduce the risk of post-surgical complications such as clots. He is encouraged to walk around four times a day to decrease clot risk.  - Encourage ambulation four times a day to reduce clot risk    Follow-up  Follow-up plans include awaiting results from the CTA and lab tests, and further cardiology evaluation. He will be contacted with results and further instructions.  - Follow up on CTA and lab results  - Coordinate cardiology appointment for further evaluation            Highlighting legend:  New orders or referrals  New medications or treatments  Decreased or discontinued doses    Diagnoses and all orders for this visit:    Abdominal pain, unspecified abdominal location  -     CBC w/auto differential Blood, Venous; Future  -     Comprehensive metabolic panel Blood, Venous; Future  -     D-dimer, quantitative Blood, Venous; Future          ROXIE GUILLERMO,  MD  07/17/25    -----------------------------------------------------------------------      JOANNA History of Present Illness    Jc Sexton is a 65 year old male with atrial fibrillation who presents with abdominal discomfort and suspected splenic artery aneurysm.    He describes the abdominal discomfort as a generalized band around the umbilicus and mid-abdomen, with some discomfort in the epigastrium. He experiences nausea and chills emanating from his back but no fever. He has a decreased appetite and has been drinking water and Gatorade Zero to stay hydrated. He has small, runny bowel movements once daily without black or bloody stools.    He has a history of atrial fibrillation and is currently on Coumadin for anticoagulation and carvedilol for rate control. He denies any recent use of narcotics, managing pain with Tylenol, taking two tablets (1000 mg) once or twice a day at most.    He has a past medical history of irritable bowel syndrome, which has been a lifelong issue, exacerbated by stress and procedures. No history of gastroesophageal reflux disease or use of antacids.    No dysuria, frequency, hematuria, or pain with urination. No history of clots in his legs or lungs. He is able to move around quite a bit post-surgery.         Results    RADIOLOGY  Abdominal X-ray: Nonobstructive bowel gas pattern, suspected calcified splenic artery aneurysm measuring 4.5 cm (07/14/2025)          The following have been reviewed and updated as appropriate in this visit:        Allergies   Allergen Reactions    Allopurinol     Cephalexin     Nizoral [Ketoconazole] Other (see comments)     Stinging and burning     Current Outpatient Medications   Medication Sig Dispense Refill    acetaminophen (Tylenol Extra Strength) 500 mg tablet Take 1,000 mg by mouth 3 times a day as needed for fever, headaches, pain scale 1-3/10 or pain scale 8-10/10.      polyethylene glycol (Miralax) 17 gram packet Take 17 g by mouth  daily.      ondansetron (ZOFRAN) 4 mg tablet Take 1 tablet (4 mg total) by mouth every 6 hours as needed for nausea 20 tablet 0    furosemide (LASIX) 20 mg tablet Take 1 tablet (20 mg total) by mouth daily 90 tablet 1    triamcinolone (KENALOG) 0.1 % cream Apply thin layer over affected areas twice daily for two weeks then twice weekly for maintenance. OK to increase use again for flares. Avoid face, armpits, and groin. 454 g 3    triamcinolone (KENALOG) 0.1 % ointment Apply thin layer over affected areas twice daily for two weeks then twice weekly for maintenance. OK to increase use again for flares. Avoid face, armpits and groin. 454 g 3    tacrolimus (PROTOPIC) 0.1 % ointment Apply thin layers over rash twice daily as needed. Safe to use on face/genitals. Safe to use daily. 30 g 3    febuxostat (ULORIC) 80 mg tablet Take 1 tablet (80 mg total) by mouth daily 90 tablet 3    warfarin (COUMADIN) 4 mg tablet ALTERNATE 2MG WITH 4MG TABLET BY MOUTH EVERY OTHER DAY 45 tablet 0    warfarin (COUMADIN) 2 mg tablet ALTERNATE  2 MG BY MOUTH WITH 4 MG TABLET EVERY OTHER DAY 45 tablet 0    losartan (COZAAR) 25 mg tablet Take 1 tablet (25 mg total) by mouth daily 90 tablet 3    carvediloL (COREG) 6.25 mg tablet Take 1 tablet (6.25 mg total) by mouth 2 (two) times a day with meals 180 tablet 3    spironolactone (ALDACTONE) 25 mg tablet Take 0.5 tablets (12.5 mg total) by mouth daily 45 tablet 3    vitamin E,dl-alpha tocopherol, (VITAMIN E, BULK, MISC) 1 tablet by miscellaneous route daily      oxyCODONE (ROXICODONE) 5 mg immediate release tablet Take 1 tablet (5 mg total) by mouth every 4 hours as needed for pain scale 8-10/10 Max Daily Amount: 30 mg (Patient not taking: Reported on 7/17/2025) 40 tablet 0     No current facility-administered medications for this visit.     Past Medical History:   Diagnosis Date    Atrial fibrillation (CMS/[facility])     Cardiovascular disease     CHF (congestive heart failure) (CMS/[facility])     COVID-19  01/14/2022    Dental disease     Dysrhythmia     GERD (gastroesophageal reflux disease)     Gout     Headache 10/17/2023    Hypertension     Injury of knee 10/17/2023    Pain in joint, shoulder region 01/25/2012    Note: Unchanged    Psoriasis     Sleep apnea     Sprain of rotator cuff capsule 07/12/2011    Note: Unchanged     Past Surgical History:   Procedure Laterality Date    CARDIOVERSION  03/2010    COLONOSCOPY  01/01/1995    ORTHOPEDIC SURGERY Bilateral     Knee Scope    OTHER SURGICAL HISTORY Right     Orthopedic:Rotator cuff x2 Right shoulder    TOTAL KNEE ARTHROPLASTY Left 7/11/2025    Procedure: LEFT TOTAL KNEE ARTHROPLASTY USING ROBOTIC ORTHOPEDIC SURGICAL ASSISTANT;  Surgeon: Zane Gillespie MD;  Location: [facility];  Service: Orthopedics;  Laterality: Left;     Family History   Problem Relation Age of Onset    Heart disease Mother     Colon cancer Mother     Heart disease Father         MI at age 87    Other Father         Crohns    Psoriasis Father     Asthma Father     Diabetes type II Father's Sister     Heart disease Father's Brother      Social History     Socioeconomic History    Marital status:    Tobacco Use    Smoking status: Former    Smokeless tobacco: Former   Vaping Use    Vaping status: Never Used   Substance and Sexual Activity    Alcohol use: No    Drug use: Never    Sexual activity: Defer     Social Drivers of Health     Tobacco Use: Medium Risk (7/17/2025)    Patient History     Smoking Tobacco Use: Former     Smokeless Tobacco Use: Former   Alcohol Use: Not At Risk (12/19/2023)    AUDIT-C     Frequency of Alcohol Consumption: Never     Average Number of Drinks: Patient does not drink     Frequency of Binge Drinking: Patient declined   Financial Resource Strain: Patient Declined (12/19/2023)    Overall Financial Resource Strain (CARDIA)     Difficulty of Paying Living Expenses: Patient declined   Food Insecurity: No Food Insecurity (7/11/2025)    Hunger Vital Sign     Worried  About Running Out of Food in the Last Year: Never true     Ran Out of Food in the Last Year: Never true   Transportation Needs: No Transportation Needs (7/13/2025)    OASIS : Transportation     Lack of Transportation (Medical): No     Lack of Transportation (Non-Medical): No     Patient Unable or Declines to Respond: No   Physical Activity: Insufficiently Active (12/19/2023)    Exercise Vital Sign     Days of Exercise per Week: 1 day     Minutes of Exercise per Session: 60 min   Stress: No Stress Concern Present (12/19/2023)    [employer] - Occupational Stress Questionnaire     Feeling of Stress : Not at all   Social Connections: Feeling Socially Integrated (7/13/2025)    OASIS : Social Isolation     Frequency of experiencing loneliness or isolation: Never   Housing Stability: Low Risk  (7/11/2025)    Housing Stability Vital Sign     Unable to Pay for Housing in the Last Year: No     Number of Times Moved in the Last Year: 0     Homeless in the Last Year: No   Utilities: Not At Risk (7/11/2025)    [facility]     Threatened with loss of utilities: No       Review of Systems   Constitutional:  Positive for chills and fatigue. Negative for activity change, diaphoresis and fever.   Cardiovascular:  Positive for leg swelling. Negative for chest pain and palpitations.   Gastrointestinal:  Positive for abdominal pain, diarrhea and nausea. Negative for blood in stool, constipation, rectal pain and vomiting.   Endocrine: Negative for cold intolerance, heat intolerance, polydipsia, polyphagia and polyuria.   Genitourinary:  Negative for dysuria and frequency.   Musculoskeletal:  Positive for arthralgias.   Skin:  Negative for rash.       Physical Exam:  /66   Pulse 74   Temp 36.9 °C (98.4 °F)   Resp 18   Wt (!) 139.4 kg (307 lb 6.4 oz)   SpO2 95%   BMI 35.53 kg/m²   Physical Exam  Vitals and nursing note reviewed.   Constitutional:       General: He is not in acute  distress.     Appearance: He is well-developed. He is not diaphoretic.   HENT:      Head: Normocephalic and atraumatic.      Nose: Nose normal.      Mouth/Throat:      Pharynx: No oropharyngeal exudate.   Eyes:      General: No scleral icterus.        Right eye: No discharge.         Left eye: No discharge.      Conjunctiva/sclera: Conjunctivae normal.      Pupils: Pupils are equal, round, and reactive to light.   Neck:      Thyroid: No thyromegaly.      Vascular: No JVD.      Trachea: No tracheal deviation.   Cardiovascular:      Rate and Rhythm: Normal rate and regular rhythm.      Heart sounds: Normal heart sounds. No murmur heard.     No friction rub. No gallop.   Pulmonary:      Effort: Pulmonary effort is normal. No respiratory distress.      Breath sounds: Normal breath sounds. No stridor. No wheezing or rales.   Abdominal:      General: Abdomen is protuberant. Bowel sounds are normal. There is no distension.      Palpations: Abdomen is soft. There is no shifting dullness, fluid wave, hepatomegaly or splenomegaly.      Tenderness: There is no abdominal tenderness. There is no right CVA tenderness, left CVA tenderness, guarding or rebound. Negative signs include Bender's sign and McBurney's sign.      Hernia: No hernia is present.      Comments: Small lipomas over the abdomen nontender or erythematous or abnormal otherwise   Musculoskeletal:         General: No deformity. Normal range of motion.      Cervical back: Neck supple.   Skin:     General: Skin is warm and dry.      Findings: No rash.   Neurological:      Mental Status: He is alert and oriented to person, place, and time.   Psychiatric:         Behavior: Behavior normal.         Thought Content: Thought content normal.         Physical Exam voiced in office today:  Physical Exam    ABDOMEN: Abdomen is quiet with no guarding or rebound tenderness.              This document was transcribed using both or either [employer] and [employer] software. Sometimes,  words are not transcribed exactly as they are spoken.

## 2025-07-17 NOTE — LETTER
07/17/25      [facility], later [facility] [address]  [address]  [place] [address]   Dept: [phone]       Dear Brando,     Please find a copy of my assessment and plan below for your review. A majority was transcribed by the artificial intelligence application called [employer].   Following, you will find a list of diagnoses and orders.    If you have questions or find errors please call my nurse, Ruth, at [phone]. Or, you can send her a nonemergent message through [employer]     JOANNA   Assessment/Plan     Suspected splenic artery aneurysm  A 4.5 cm suspected calcified splenic artery aneurysm was identified on an abdominal x-ray. He presents with nausea and vague abdominal discomfort, which could be related to the aneurysm, although the calcification suggests it may have been present for many years. The aneurysm is not likely causing acute symptoms. A CTA of the abdomen and pelvis is recommended to better assess the size and characteristics of the aneurysm. The aneurysm is the third most common in the body and is often associated with conditions of increased flow such as pregnancy, arterial venous fistulas, malformations, and portal hypertension.  - Order CTA of the abdomen and pelvis  - Refer to cardiology for management of the splenic artery aneurysm    Post-surgical recovery  Recent surgery with associated symptoms of nausea, abdominal discomfort, and chills. He is advised to stay hydrated and is using Tylenol for pain management. No recent use of narcotics. Labs are ordered to assess for anemia and other post-surgical complications. He is encouraged to eat small amounts frequently to avoid stimulating peristalsis.  - Order CBC, CMP, and D-dimer  - Advise hydration with water and electrolyte solutions  - Monitor for signs of anemia and other complications    Irritable bowel syndrome (IBS)  IBS may be exacerbated by recent surgery. Symptoms include nausea and abdominal  discomfort. He is advised to eat small, frequent meals to manage symptoms. Procedures like surgery can exacerbate IBS symptoms due to stress on the body.  - Advise small, frequent meals to manage IBS symptoms    Atrial fibrillation  Chronic atrial fibrillation managed with Coumadin for anticoagulation and carvedilol for rate control. There is a consideration for a Watchman procedure to potentially discontinue Coumadin. Referral to cardiology is placed for evaluation of atrial fibrillation and consideration of a Watchman procedure.  - Refer to cardiology for evaluation of atrial fibrillation and consideration of a Watchman procedure    Chronic systolic heart failure  Chronic systolic heart failure under cardiology care.  - Refer to cardiology for management of chronic systolic heart failure    General Health Maintenance  He is advised to stay active to reduce the risk of post-surgical complications such as clots. He is encouraged to walk around four times a day to decrease clot risk.  - Encourage ambulation four times a day to reduce clot risk    Follow-up  Follow-up plans include awaiting results from the CTA and lab tests, and further cardiology evaluation. He will be contacted with results and further instructions.  - Follow up on CTA and lab results  - Coordinate cardiology appointment for further evaluation            Highlighting legend:  New orders or referrals  New medications or treatments  Decreased or discontinued doses    Diagnoses and all orders for this visit:    Abdominal pain, unspecified abdominal location  -     CBC w/auto differential Blood, Venous; Future  -     Comprehensive metabolic panel Blood, Venous; Future  -     D-dimer, quantitative Blood, Venous; Future          ROXIE GUILLERMO MD  07/17/25

## 2025-07-18 ENCOUNTER — TELEPHONE (OUTPATIENT)
Dept: FAMILY MEDICINE | Facility: CLINIC | Age: 65
End: 2025-07-18
Payer: MEDICARE

## 2025-07-18 DIAGNOSIS — R79.89 D-DIMER, ELEVATED: Primary | ICD-10-CM

## 2025-07-18 DIAGNOSIS — I48.19 PERSISTENT ATRIAL FIBRILLATION (CMS/HCC): ICD-10-CM

## 2025-07-18 NOTE — TELEPHONE ENCOUNTER
Orders placed for a stat for a INR, CT and venous ultrasound.  Contacted radiology spoke with Nighat and she said she could get him in today at 4 pm. Called Jc to discuss the orders that have been placed. Jc had Kena get on the phone with him. I explained the orders to her, she informed me that there was o way they could get him there at 4 pm because he cannot get into her Jeep,that she uses [employer] and she must have 24 hour notice. I then asked if she could get it arranged for Monday and she said yes, I confirmed with the provider that we could schedule the CT for Monday and explained the situation to him, that this was the patient and spouses wishes.I then talked with Kena and Jc and told them that I would call the hospital to get this scheduled. Kena again said that she could not bring him in today to have his lab work taken. Spoke to Nighat in radiology and she graciously said that she would call the patient and try to get him scheduled as quickly as possible. I updated provider.   Returned call to Jc, he does not want to complete the test ordered as he wants Dr. Gordon to reach out to his Cardiology team, Mariah Mohamud to take over, Jc did ask what the test were for again, so I explained what each test was and why they had been ordered. Jc then said I am going to be a nice as possible but contact Mariah on Monday to see what she wants to do, I will not be doing the tests that Dr. Gordon ordered.

## 2025-07-18 NOTE — TELEPHONE ENCOUNTER
"Jc called [facility] clinic and left ms stating \"my PCP order tests that I think I have already had done\" and wanted a call back to discuss.   I reviewed chat and notes from yesterday and today.  I called pt an wife.  I told them Mariah Mohamud, MADONNA and Dr. Walsh are out until Tuesday, but reassured him that they would advise that he follow the recommendations of his PCP.  I reassured him that he has not had these tests done before.  I explained the elevated d-dimer and the risk of clots after his recent surgery and holding of his warfarin.  I explained that his PCP is trying to get further testing to keep him safe.  I advised he call them back right away on Monday and get the tests and if he has any s/s before then of SOB, extremity pain, swelling, discoloration or s/s stroke to call 911 immediately.  They both state understanding and agreement.   "

## 2025-07-18 NOTE — TELEPHONE ENCOUNTER
I contacted the patient and he reports that he is doing better and having bowel movements. He will let us know if he has questions.

## 2025-07-19 ENCOUNTER — HOME CARE VISIT (OUTPATIENT)
Dept: HOME HEALTH SERVICES | Facility: HOME HEALTH | Age: 65
End: 2025-07-19
Payer: MEDICARE

## 2025-07-20 NOTE — HOME HEALTH
Diagnosis: left knee arthroplasty     Disciplines involved: Nursing, Physical Therapy and Home Health Aide    Rehospitalization risk: wound, number of medications, high risk medication, multiple comorbidities, non-adherence to treatment plan and recent surgeries    What interventions have been implemented on high-risk re-hospitalization patients? Education, frequent labs, and assessment.    Concerns in home: non-compliance    Hospitalized since admission? No    Barriers to meeting Goals? Pt motivation, c/o nausea, vomitting, and fatigue    Any concerns for a LUPA? No    Any Referrals needed for post discharge? none

## 2025-07-21 ENCOUNTER — HOSPITAL ENCOUNTER (OUTPATIENT)
Dept: CT IMAGING | Facility: HOSPITAL | Age: 65
Discharge: HOME | End: 2025-07-21
Payer: MEDICARE

## 2025-07-21 ENCOUNTER — HOSPITAL ENCOUNTER (OUTPATIENT)
Dept: CT IMAGING | Facility: HOSPITAL | Age: 65
End: 2025-07-21
Payer: MEDICARE

## 2025-07-21 ENCOUNTER — HOSPITAL ENCOUNTER (OUTPATIENT)
Dept: ULTRASOUND IMAGING | Facility: HOSPITAL | Age: 65
Discharge: HOME | End: 2025-07-21
Payer: MEDICARE

## 2025-07-21 ENCOUNTER — LAB (OUTPATIENT)
Dept: LAB | Facility: CLINIC | Age: 65
End: 2025-07-21
Payer: MEDICARE

## 2025-07-21 ENCOUNTER — HOME CARE VISIT (OUTPATIENT)
Dept: HOME HEALTH SERVICES | Facility: HOME HEALTH | Age: 65
End: 2025-07-21
Payer: MEDICARE

## 2025-07-21 ENCOUNTER — ANTICOAGULATION VISIT (OUTPATIENT)
Dept: ANTICOAGULATION | Facility: CLINIC | Age: 65
End: 2025-07-21
Payer: MEDICARE

## 2025-07-21 DIAGNOSIS — I26.99 ACUTE PULMONARY EMBOLISM, UNSPECIFIED PULMONARY EMBOLISM TYPE, UNSPECIFIED WHETHER ACUTE COR PULMONALE PRESENT (CMS/HCC): Primary | ICD-10-CM

## 2025-07-21 DIAGNOSIS — R79.89 D-DIMER, ELEVATED: ICD-10-CM

## 2025-07-21 DIAGNOSIS — I48.19 PERSISTENT ATRIAL FIBRILLATION (CMS/HCC): Primary | Chronic | ICD-10-CM

## 2025-07-21 DIAGNOSIS — I48.19 PERSISTENT ATRIAL FIBRILLATION (CMS/HCC): ICD-10-CM

## 2025-07-21 DIAGNOSIS — I48.21 PERMANENT ATRIAL FIBRILLATION (CMS/HCC): Primary | ICD-10-CM

## 2025-07-21 LAB
INR BLD: 3.4
PROTHROMBIN TIME: 38.4 SECONDS (ref 9.4–12.5)

## 2025-07-21 PROCEDURE — 85610 PROTHROMBIN TIME: CPT

## 2025-07-21 PROCEDURE — 36415 COLL VENOUS BLD VENIPUNCTURE: CPT

## 2025-07-21 PROCEDURE — 2550000100 HC RX 255: Performed by: FAMILY MEDICINE

## 2025-07-21 PROCEDURE — 93970 EXTREMITY STUDY: CPT

## 2025-07-21 PROCEDURE — 71275 CT ANGIOGRAPHY CHEST: CPT

## 2025-07-21 RX ORDER — IOPAMIDOL 755 MG/ML
80 INJECTION, SOLUTION INTRAVASCULAR ONCE
Status: COMPLETED | OUTPATIENT
Start: 2025-07-21 | End: 2025-07-21

## 2025-07-21 RX ADMIN — IOPAMIDOL 80 ML: 755 INJECTION, SOLUTION INTRAVENOUS at 13:30

## 2025-07-21 NOTE — PROGRESS NOTES
Subjective   Brando Sexton is contacted by the anticoagulation clinic for monitoring of his long term warfarin therapy.       Today Brando reports the following:   Bleeding signs/symptoms:     Major bleeding event:    Thrombosis signs/symptoms:    Thromboembolic event:    Missed doses:    Medication changes:    Dietary changes:    Bacterial/viral infection: no  Other concerns:      Current Outpatient Medications   Medication Sig Dispense Refill    acetaminophen (Tylenol Extra Strength) 500 mg tablet Take 1,000 mg by mouth 3 times a day as needed for fever, headaches, pain scale 1-3/10 or pain scale 8-10/10.      polyethylene glycol (Miralax) 17 gram packet Take 17 g by mouth daily.      oxyCODONE (ROXICODONE) 5 mg immediate release tablet Take 1 tablet (5 mg total) by mouth every 4 hours as needed for pain scale 8-10/10 Max Daily Amount: 30 mg (Patient not taking: Reported on 7/17/2025) 40 tablet 0    ondansetron (ZOFRAN) 4 mg tablet Take 1 tablet (4 mg total) by mouth every 6 hours as needed for nausea 20 tablet 0    furosemide (LASIX) 20 mg tablet Take 1 tablet (20 mg total) by mouth daily 90 tablet 1    triamcinolone (KENALOG) 0.1 % cream Apply thin layer over affected areas twice daily for two weeks then twice weekly for maintenance. OK to increase use again for flares. Avoid face, armpits, and groin. 454 g 3    triamcinolone (KENALOG) 0.1 % ointment Apply thin layer over affected areas twice daily for two weeks then twice weekly for maintenance. OK to increase use again for flares. Avoid face, armpits and groin. 454 g 3    tacrolimus (PROTOPIC) 0.1 % ointment Apply thin layers over rash twice daily as needed. Safe to use on face/genitals. Safe to use daily. 30 g 3    febuxostat (ULORIC) 80 mg tablet Take 1 tablet (80 mg total) by mouth daily 90 tablet 3    warfarin (COUMADIN) 4 mg tablet ALTERNATE 2MG WITH 4MG TABLET BY MOUTH EVERY OTHER DAY 45 tablet 0    warfarin (COUMADIN) 2 mg tablet ALTERNATE  2  MG BY MOUTH WITH 4 MG TABLET EVERY OTHER DAY 45 tablet 0    losartan (COZAAR) 25 mg tablet Take 1 tablet (25 mg total) by mouth daily 90 tablet 3    carvediloL (COREG) 6.25 mg tablet Take 1 tablet (6.25 mg total) by mouth 2 (two) times a day with meals 180 tablet 3    spironolactone (ALDACTONE) 25 mg tablet Take 0.5 tablets (12.5 mg total) by mouth daily 45 tablet 3    vitamin E,dl-alpha tocopherol, (VITAMIN E, BULK, MISC) Take 1 tablet by mouth daily        No current facility-administered medications for this visit.         Objective     There were no vitals taken for this visit.    Assessment/Plan      Anticoagulation Summary  As of 7/21/2025      INR goal:  2.0-3.0   INR used for dosing:  3.4 (7/21/2025)   Full warfarin instructions:  2 mg every Sun, Tue, Thu; 4 mg all other days   Next INR check:  8/18/2025   Priority:  Maintenance    Indications    Persistent atrial fibrillation (CMS/HCC) [I48.19]                 Anticoagulation Care Providers       Provider Role Specialty Phone number    Avery Gordon MD Referring Family Medicine [phone]            Anticoagulation therapy status: INR is slightly elevated, however, will continue current dosing - would rather have INR a little elevated than dip subtherapeutic.Jc agreed to continue current Warfarin dosing and follow-up with an INR in 1 week. Patient verbalized understanding regarding dose, monitoring and INR.      Please reference Anticoagulation episode for additional documentation.

## 2025-07-22 ENCOUNTER — HOME CARE VISIT (OUTPATIENT)
Dept: HOME HEALTH SERVICES | Facility: HOSPICE | Age: 65
End: 2025-07-22
Payer: MEDICARE

## 2025-07-22 VITALS
TEMPERATURE: 97.5 F | RESPIRATION RATE: 16 BRPM | OXYGEN SATURATION: 97 % | HEART RATE: 63 BPM | DIASTOLIC BLOOD PRESSURE: 64 MMHG | SYSTOLIC BLOOD PRESSURE: 110 MMHG

## 2025-07-22 PROCEDURE — G0299 HHS/HOSPICE OF RN EA 15 MIN: HCPCS | Mod: PPS

## 2025-07-22 ASSESSMENT — ENCOUNTER SYMPTOMS
PAIN: 1
LAST BOWEL MOVEMENT: 67407
PAIN LOCATION - PAIN SEVERITY: 2/10
FATIGUES EASILY: 1
DIARRHEA: 1
DYSPNEA ON EXERTION: 1
NAUSEA: 1
DYSPNEA ACTIVITY LEVEL: AFTER AMBULATING MORE THAN 20 FT
PAIN LOCATION: LEFT KNEE
MUSCLE WEAKNESS: 1
RESTLESSNESS: 1
PAIN LOCATION - PAIN QUALITY: ACHE, SORE
HIGHEST PAIN SEVERITY IN PAST 24 HOURS: 4/10
SHORTNESS OF BREATH: 1
PAIN SEVERITY GOAL: 0/10
STOOL FREQUENCY: DAILY
PERSON REPORTING PAIN: PATIENT
SUBJECTIVE PAIN PROGRESSION: GRADUALLY IMPROVING
LOWEST PAIN SEVERITY IN PAST 24 HOURS: 2/10
PAIN LOCATION - PAIN FREQUENCY: INTERMITTENT

## 2025-07-22 ASSESSMENT — ACTIVITIES OF DAILY LIVING (ADL)
TRANSPORTATION_METHOD: FAMILY
ENTERING_EXITING_HOME: INDEPENDENT
GROOMING_REQUIRES_ASSISTANCE: 1
BATHING_REQUIRES_ASSISTANCE: 1
TRANSPORTATION_METHOD: REGULAR CAR
DRESSING_REQUIRES_ASSISTANCE: 1

## 2025-07-22 NOTE — HOME HEALTH
Subjective/Objective:  Patient is sitting at his kitchen island upon my arrival. He reports improved nausea and has been able to eat well for the last couple of days. He has minimal pain and only needs to ice it once in the evening. It was discovered he has a small PE, so I educated on the signs and symptom to monitor for that would requrie a call to 911. He verbalized understanding. We discussed some tips he can try to help him sleep better, including trying OTC melatonin. He denies any further concerns at this time. All other questions/lconcerns answered throughout visit.    Assessment:  Response to care: open to care    Progress toward previous goals: care/treatment is still necessary    Potential barriers: co-morbidities    Patient strengths: high level of prior function, motivated and strong support system/involved caregiver    Education/instructions provided on Medications dose and purpose on Melatonin, Post-op care including incision care, symptom management and follow-up instructions and PE signs and symptoms.    Education provided to patient, family and caregiver through explanation. Patient demonstrated acceptance and verbalizes understanding.    Plan:  Continued need for skilled services: Skilled Nursing: disease education and wound care    Care plan reviewed and appropriate. Plan for next visit to include providing education on medications and PE and performing wound care on left knee incision.    Plan of Care Updates:   Plan of Care in development was verbally reviewed with the patient and  included visit schedule and visit frequency.

## 2025-07-23 ENCOUNTER — TELEPHONE (OUTPATIENT)
Dept: CARDIOLOGY | Facility: CLINIC | Age: 65
End: 2025-07-23

## 2025-07-23 ENCOUNTER — HOME CARE VISIT (OUTPATIENT)
Dept: HOME HEALTH SERVICES | Facility: HOSPICE | Age: 65
End: 2025-07-23
Payer: MEDICARE

## 2025-07-23 VITALS
TEMPERATURE: 97.1 F | SYSTOLIC BLOOD PRESSURE: 126 MMHG | HEART RATE: 83 BPM | OXYGEN SATURATION: 96 % | DIASTOLIC BLOOD PRESSURE: 88 MMHG

## 2025-07-23 PROCEDURE — G0151 HHCP-SERV OF PT,EA 15 MIN: HCPCS | Mod: CQ,PPS

## 2025-07-23 ASSESSMENT — ACTIVITIES OF DAILY LIVING (ADL)
TRANSPORTATION_METHOD: FAMILY
TRANSPORTATION_METHOD: REGULAR CAR
ENTERING_EXITING_HOME: ONE PERSON

## 2025-07-23 ASSESSMENT — ENCOUNTER SYMPTOMS
PAIN LOCATION - PAIN FREQUENCY: INTERMITTENT
PAIN LOCATION - PAIN QUALITY: ACHE
PAIN LOCATION: LEFT KNEE
PAIN LOCATION - PAIN SEVERITY: 2/10
PERSON REPORTING PAIN: PATIENT
PAIN: 1
PAIN LOCATION - PAIN DURATION: DAILY

## 2025-07-23 NOTE — TELEPHONE ENCOUNTER
Called and spoke with patients about the watchman referral we received and he would like to speak with his wife first to see if he would like to proceed with testing and getting scheduled for a consultation. He will call us back on our direct line. If he wished to proceed he will need a Ctafib and VC visit with Dr. Draper. EFRA Pitts

## 2025-07-23 NOTE — HOME HEALTH
Subjective:  Pt reports he's feeling pretty well today, with minimal pain. Still walking with FWW. Reports no recent falls or medication changes.    Objective clinical findings related to patient's need for care:  Vitals assessed: WNL    Reviewed/practiced HEP for BLE exercises:  Supine:  -ankle pumps x 20  -quad sets x 20  -heel slides x 20  -glute sets x 20  -hip abduction slides x 20  -straight leg raise x 15  -SAQ x 15    Seated:  -ankle pumps x 10  -LAQ x 10  -marches x 10    Standing:  -heel/toe raises x 15  -marches x 15    Ambulated approx. 50ft x 2 throughout home using FWW and SBA. Cues provided for safety and technique.      Assessment: Pt improving with strength, balance, and gait technique.  Response to care: Pt tolerated tx well, with mild fatigue noted.    Progress toward previous goals: patient is progressing with current care/treatment and care/treatment is still necessary    Potential barriers: co-morbidities and obesity    Patient strengths: high level of prior function, motivated and strong support system/involved caregiver    Education/instructions provided on Fall/injury prevention related to removal fall/trip hazards and use of assistive device    Education provided to patient, family and caregiver through explanation.  Patient demonstrated acceptance and patient demonstrated understanding.    Plan:  Continued need for skilled services: Skilled Nursing: wound care, Physical Therapy: strengthening and balance, range of motion, gait training and transfer training.  Care coordination report provided to primary nurse and PT. Individuals reported to EFRA Velasquez, Brain, PT.  Report included pt status/progress with POC.    Care plan reviewed and appropriate. Plan for next visit to include continue with strength, gait, ROM, and balance training as tolerated.    Plan of Care Updates:   Plan of Care in development was verbally reviewed with the patient and  included visit schedule. A copy of the plan of  care was given to the patient.

## 2025-07-24 ENCOUNTER — HOME CARE VISIT (OUTPATIENT)
Dept: HOME HEALTH SERVICES | Facility: HOSPICE | Age: 65
End: 2025-07-24
Payer: MEDICARE

## 2025-07-24 ENCOUNTER — OFFICE VISIT (OUTPATIENT)
Dept: ORTHOPEDIC SURGERY | Facility: CLINIC | Age: 65
End: 2025-07-24
Payer: MEDICARE

## 2025-07-24 VITALS — SYSTOLIC BLOOD PRESSURE: 124 MMHG | DIASTOLIC BLOOD PRESSURE: 78 MMHG | TEMPERATURE: 97.4 F

## 2025-07-24 DIAGNOSIS — Z96.652 S/P TKR (TOTAL KNEE REPLACEMENT), LEFT: Primary | ICD-10-CM

## 2025-07-24 PROCEDURE — G0156 HHCP-SVS OF AIDE,EA 15 MIN: HCPCS | Mod: PPS

## 2025-07-24 ASSESSMENT — PAIN - FUNCTIONAL ASSESSMENT: PAIN_FUNCTIONAL_ASSESSMENT: 0-10

## 2025-07-24 NOTE — PROGRESS NOTES
Dermabond Prineo Skin Closure removed. The wound is well healed without signs of infection. Noted possible opening of wound near top of left knee, no signs of bleeding or infection.  Wound care and activity instructions given. Return as directed.

## 2025-07-24 NOTE — LETTER
07/24/25      [facility]ICINE ORTHOPEDIC SURGERY   [facility]  [address] [place] [address]   Dept: [phone]         To whom it may concern:    Brando Sexton had a left total knee arthroplasty with Dr. Zane Gillespie on 07/11/25. Our recommendation is for the patient to take amoxicillin 2 grams ONE HOUR by mouth prior to all dental cleanings and procedures. We also recommend that the patient wait at least 3 months after surgery before having elective dental work done. It is recommended to take these antibiotics for a lifetime prior to these appointments. If you have any questions, do not hesitate to contact our office at [phone].    Dr. Zane Gillespie

## 2025-07-25 NOTE — PROGRESS NOTES
Post-op of the Left Knee (13 days S/P LEFT TOTAL KNEE ARTHROPLASTY USING ROBOTIC ORTHOPEDIC SURGICAL ASSISTANT with Dr. Gillespie on 7/11/25. Pt with wife today, Kena. Rates pain 3/10, taking tylenol for pain. No recent falls. Using walker to ambulate today. )      HPI  Patient is here today for 2 week follow-up of their left total knee arthroplasty done on 7/11/2025 by Dr. Gillespie.  Patient overall is doing well no fevers/chills/sweats no problems with the incision they are doing physical therapy and home exercise program progressing nicely pain is improving. Taking Tylenol for pain.  Using a walker today, pain rated at 3 out of 10.    He did present to urgent care 2 days after discharge with nausea and abdominal distention.  He underwent abdominal x-ray at identified a splenic artery aneurysm, but primary care provider felt it was likely chronic and not causing his symptoms.  He went on to have a CTA of the abdomen pelvis to further characterize the aneurysm.  CTA demonstrated inflammation through the duodenum and he also had a D-dimer that was quite elevated.  He went on to have a CT scan of the chest and ultrasound of the bilateral lower extremities per his PCP.  Negative for DVT, but pulmonary embolism was present.  PCP managing coagulation status.    Past Medical History:   Diagnosis Date    Atrial fibrillation (CMS/[facility])     Cardiovascular disease     CHF (congestive heart failure) (CMS/HCC)     COVID-19 01/14/2022    Dental disease     Dysrhythmia     GERD (gastroesophageal reflux disease)     Gout     Headache 10/17/2023    Hypertension     Injury of knee 10/17/2023    Pain in joint, shoulder region 01/25/2012    Note: Unchanged    Psoriasis     Sleep apnea     Sprain of rotator cuff capsule 07/12/2011    Note: Unchanged     Past Surgical History:   Procedure Laterality Date    CARDIOVERSION  03/2010    COLONOSCOPY  01/01/1995    ORTHOPEDIC SURGERY Bilateral     Knee Scope    OTHER SURGICAL HISTORY Right      Orthopedic:Rotator cuff x2 Right shoulder    TOTAL KNEE ARTHROPLASTY Left 7/11/2025    Procedure: LEFT TOTAL KNEE ARTHROPLASTY USING ROBOTIC ORTHOPEDIC SURGICAL ASSISTANT;  Surgeon: Zane Gillespie MD;  Location: [facility] OR;  Service: Orthopedics;  Laterality: Left;     Prior to Admission medications   Medication Sig Start Date End Date Taking? Authorizing Provider   acetaminophen (Tylenol Extra Strength) 500 mg tablet Take 1,000 mg by mouth 3 times a day as needed for fever, headaches, pain scale 1-3/10 or pain scale 8-10/10.   Yes Provider, /MD Jennie   ondansetron (ZOFRAN) 4 mg tablet Take 1 tablet (4 mg total) by mouth every 6 hours as needed for nausea 7/12/25  Yes Anca France PA-C   furosemide (LASIX) 20 mg tablet Take 1 tablet (20 mg total) by mouth daily 7/9/25  Yes Avery Gordon MD   triamcinolone (KENALOG) 0.1 % cream Apply thin layer over affected areas twice daily for two weeks then twice weekly for maintenance. OK to increase use again for flares. Avoid face, armpits, and groin. 5/2/25  Yes Chiara Esteves MD   tacrolimus (PROTOPIC) 0.1 % ointment Apply thin layers over rash twice daily as needed. Safe to use on face/genitals. Safe to use daily. 5/2/25  Yes Chiara Esteves MD   febuxostat (ULORIC) 80 mg tablet Take 1 tablet (80 mg total) by mouth daily 4/23/25 4/23/26 Yes Doris Yates MD   warfarin (COUMADIN) 4 mg tablet ALTERNATE 2MG WITH 4MG TABLET BY MOUTH EVERY OTHER DAY 4/21/25  Yes Avery Gordon MD   warfarin (COUMADIN) 2 mg tablet ALTERNATE  2 MG BY MOUTH WITH 4 MG TABLET EVERY OTHER DAY 4/21/25  Yes Avery Gordon MD   losartan (COZAAR) 25 mg tablet Take 1 tablet (25 mg total) by mouth daily 4/21/25 4/21/26 Yes Mariah Mohamud CNP   carvediloL (COREG) 6.25 mg tablet Take 1 tablet (6.25 mg total) by mouth 2 (two) times a day with meals 4/21/25 4/21/26 Yes Hullinger, Mariah R, CNP   spironolactone (ALDACTONE) 25 mg tablet Take 0.5 tablets (12.5 mg total) by mouth daily 12/19/24  12/19/25 Yes Edgar Walsh MD   polyethylene glycol (Miralax) 17 gram packet Take 17 g by mouth daily.  Patient not taking: Reported on 7/24/2025    Provider, Steph/HspMD tricia   oxyCODONE (ROXICODONE) 5 mg immediate release tablet Take 1 tablet (5 mg total) by mouth every 4 hours as needed for pain scale 8-10/10 Max Daily Amount: 30 mg  Patient not taking: Reported on 7/24/2025 7/12/25   Anca France PA-C   triamcinolone (KENALOG) 0.1 % ointment Apply thin layer over affected areas twice daily for two weeks then twice weekly for maintenance. OK to increase use again for flares. Avoid face, armpits and groin. 5/2/25   Chiara Esteves MD   vitamin E,dl-alpha tocopherol, (VITAMIN E, BULK, MISC) Take 1 tablet by mouth daily   Patient not taking: Reported on 7/24/2025    Provider, MD Ashu     Allergies   Allergen Reactions    Allopurinol     Cephalexin     Nizoral [Ketoconazole] Other (see comments)     Stinging and burning     Social History     Socioeconomic History    Marital status:      Spouse name: Not on file    Number of children: Not on file    Years of education: Not on file    Highest education level: Not on file   Occupational History    Not on file   Tobacco Use    Smoking status: Former    Smokeless tobacco: Former   Vaping Use    Vaping status: Never Used   Substance and Sexual Activity    Alcohol use: No    Drug use: Never    Sexual activity: Defer   Other Topics Concern    Not on file   Social History Narrative    Not on file     Social Drivers of Health     Financial Resource Strain: Patient Declined (12/19/2023)    Overall Financial Resource Strain (CARDIA)     Difficulty of Paying Living Expenses: Patient declined   Food Insecurity: No Food Insecurity (7/11/2025)    Hunger Vital Sign     Worried About Running Out of Food in the Last Year: Never true     Ran Out of Food in the Last Year: Never true   Transportation Needs: No Transportation Needs (7/13/2025)    OASIS :  Transportation     Lack of Transportation (Medical): No     Lack of Transportation (Non-Medical): No     Patient Unable or Declines to Respond: No   Physical Activity: Insufficiently Active (12/19/2023)    Exercise Vital Sign     Days of Exercise per Week: 1 day     Minutes of Exercise per Session: 60 min   Stress: No Stress Concern Present (12/19/2023)    [employer] [place] [employer] - Occupational Stress Questionnaire     Feeling of Stress : Not at all   Social Connections: Feeling Socially Integrated (7/13/2025)    OASIS : Social Isolation     Frequency of experiencing loneliness or isolation: Never   Intimate Partner Violence: Not At Risk (7/11/2025)    Humiliation, Afraid, Rape, and Kick questionnaire     Fear of Current or Ex-Partner: No     Emotionally Abused: No     Physically Abused: No     Sexually Abused: No   Housing Stability: Low Risk  (7/11/2025)    Housing Stability Vital Sign     Unable to Pay for Housing in the Last Year: No     Number of Times Moved in the Last Year: 0     Homeless in the Last Year: No     Family History   Problem Relation Age of Onset    Heart disease Mother     Colon cancer Mother     Heart disease Father         MI at age 87    Other Father         Crohns    Psoriasis Father     Asthma Father     Diabetes type II Father's Sister     Heart disease Father's Brother        /78 (BP Location: Right arm, Patient Position: Sitting, Cuff Size: Large Adult)   Temp 36.3 °C (97.4 °F) (Temporal)     Ortho Exam  General: Patient is a well-developed well-nourished 65-year-old, in no acute distress.  A and O x3  Gait: Using a walker, antalgic favoring the left  Left knee:  Surgical incision is well-approximated and healing well, no drainage, mild swelling, no erythema  Active range of motion 0-85  Compartments are supple, Homans negative  Dorsiflexion 5 out of 5  Sensation grossly intact throughout    Assessment/Plan   He is 2 weeks status post left total knee  arthroplasty done on 7/11/2025 by Dr. Gillespie.  They are progressing with physical therapy, strength, mobility and having decreasing pain.  Incision looks good and they are taking Tylenol for pain.    Plan   He will continue using the KAREY hose as directed for 2 weeks.  Continue with progressing  activity as tolerated, continue physical therapy and home exercise program and wean off pain medication as able.  We will plan a follow-up visit in 4 weeks with usual x-rays      He will continue to follow-up with his PCP regarding his coagulation status, the pulmonary embolism, the splenic artery aneurysm.

## 2025-07-28 ENCOUNTER — HOME CARE VISIT (OUTPATIENT)
Dept: HOME HEALTH SERVICES | Facility: HOSPICE | Age: 65
End: 2025-07-28
Payer: MEDICARE

## 2025-07-28 PROCEDURE — G0156 HHCP-SVS OF AIDE,EA 15 MIN: HCPCS | Mod: PPS

## 2025-07-29 ENCOUNTER — TELEPHONE (OUTPATIENT)
Dept: CARDIOLOGY | Facility: CLINIC | Age: 65
End: 2025-07-29

## 2025-07-29 NOTE — TELEPHONE ENCOUNTER
Spoke with pt to schedule CTA---please arrive at the main entrance of [facility] using the [address] entrance on the South side of the building on 9/15/25 at 10:45 AM for registration, the CT scan will begin at 11 AM.     Nothing to eat or drink for 4 hrs prior to the test     You may take your morning medications with sips of water     The kidneys will excrete this IV contrast within  24 hours after the cat scan.     It is always important to maintain good hydration, and is especially important following IV contrast administration.     Please drink an extra 2-3 glasses of water a day starting 2 days before the test, the day of the test when it is completed, and for 2 days after the test to help your kidneys clear the IV contrast from your body.     You may bring two people with you into the facility for this appointment.  Please wear a mask when you enter the building.  If you do not have one we can provide one for you to wear.     Following your CT scan you will see Dr. Draper for consultation about the Watchman device at arrival time 11:45 AM on the third floor of the heart vascular clinic

## 2025-07-30 ENCOUNTER — HOME CARE VISIT (OUTPATIENT)
Dept: HOME HEALTH SERVICES | Facility: HOSPICE | Age: 65
End: 2025-07-30
Payer: MEDICARE

## 2025-07-30 ENCOUNTER — LAB REQUISITION (OUTPATIENT)
Dept: LAB | Facility: HOSPITAL | Age: 65
End: 2025-07-30
Payer: MEDICARE

## 2025-07-30 ENCOUNTER — ANTICOAGULATION VISIT (OUTPATIENT)
Dept: PHARMACY | Facility: HOSPITAL | Age: 65
End: 2025-07-30

## 2025-07-30 VITALS
HEART RATE: 56 BPM | TEMPERATURE: 98.7 F | DIASTOLIC BLOOD PRESSURE: 75 MMHG | SYSTOLIC BLOOD PRESSURE: 110 MMHG | OXYGEN SATURATION: 97 % | RESPIRATION RATE: 16 BRPM

## 2025-07-30 DIAGNOSIS — I26.99 OTHER PULMONARY EMBOLISM WITHOUT ACUTE COR PULMONALE (CMS/HCC): ICD-10-CM

## 2025-07-30 DIAGNOSIS — I48.19 PERSISTENT ATRIAL FIBRILLATION (CMS/HCC): Primary | Chronic | ICD-10-CM

## 2025-07-30 LAB
INR BLD: 2.6
PROTHROMBIN TIME: 28.9 SECONDS (ref 9.4–12.5)

## 2025-07-30 PROCEDURE — 85610 PROTHROMBIN TIME: CPT | Performed by: FAMILY MEDICINE

## 2025-07-30 PROCEDURE — G0299 HHS/HOSPICE OF RN EA 15 MIN: HCPCS | Mod: PPS

## 2025-07-30 ASSESSMENT — ENCOUNTER SYMPTOMS
DENIES PAIN: 1
MUSCLE WEAKNESS: 1
DYSPNEA ACTIVITY LEVEL: AFTER AMBULATING MORE THAN 20 FT
PERSON REPORTING PAIN: PATIENT
SHORTNESS OF BREATH: 1
LAST BOWEL MOVEMENT: 67416
DYSPNEA ON EXERTION: 1
FATIGUES EASILY: 1

## 2025-07-30 ASSESSMENT — ACTIVITIES OF DAILY LIVING (ADL)
TRANSPORTATION_METHOD: FAMILY
ENTERING_EXITING_HOME: NEEDS ASSISTANCE
TRANSPORTATION_METHOD: REGULAR CAR
CURRENT_FUNCTION: STAND BY ASSIST

## 2025-07-30 NOTE — PROGRESS NOTES
Subjective   Brando Sexton is contacted by the anticoagulation clinic for monitoring of his long term warfarin therapy.       Today Brando reports the following:   Bleeding signs/symptoms: No   Major bleeding event: No  Thrombosis signs/symptoms: No  Thromboembolic event: No  Missed doses: No  Medication changes: No  Dietary changes: No  Bacterial/viral infection: no  Other concerns: No    Current Outpatient Medications   Medication Sig Dispense Refill    acetaminophen (Tylenol Extra Strength) 500 mg tablet Take 1,000 mg by mouth 3 times a day as needed for fever, headaches, pain scale 1-3/10 or pain scale 8-10/10.      polyethylene glycol (Miralax) 17 gram packet Take 17 g by mouth daily. (Patient not taking: Reported on 7/24/2025)      oxyCODONE (ROXICODONE) 5 mg immediate release tablet Take 1 tablet (5 mg total) by mouth every 4 hours as needed for pain scale 8-10/10 Max Daily Amount: 30 mg (Patient not taking: Reported on 7/24/2025) 40 tablet 0    ondansetron (ZOFRAN) 4 mg tablet Take 1 tablet (4 mg total) by mouth every 6 hours as needed for nausea 20 tablet 0    furosemide (LASIX) 20 mg tablet Take 1 tablet (20 mg total) by mouth daily 90 tablet 1    triamcinolone (KENALOG) 0.1 % cream Apply thin layer over affected areas twice daily for two weeks then twice weekly for maintenance. OK to increase use again for flares. Avoid face, armpits, and groin. 454 g 3    triamcinolone (KENALOG) 0.1 % ointment Apply thin layer over affected areas twice daily for two weeks then twice weekly for maintenance. OK to increase use again for flares. Avoid face, armpits and groin. 454 g 3    tacrolimus (PROTOPIC) 0.1 % ointment Apply thin layers over rash twice daily as needed. Safe to use on face/genitals. Safe to use daily. 30 g 3    febuxostat (ULORIC) 80 mg tablet Take 1 tablet (80 mg total) by mouth daily 90 tablet 3    warfarin (COUMADIN) 4 mg tablet ALTERNATE 2MG WITH 4MG TABLET BY MOUTH EVERY OTHER DAY 45  tablet 0    warfarin (COUMADIN) 2 mg tablet ALTERNATE  2 MG BY MOUTH WITH 4 MG TABLET EVERY OTHER DAY 45 tablet 0    losartan (COZAAR) 25 mg tablet Take 1 tablet (25 mg total) by mouth daily 90 tablet 3    carvediloL (COREG) 6.25 mg tablet Take 1 tablet (6.25 mg total) by mouth 2 (two) times a day with meals 180 tablet 3    spironolactone (ALDACTONE) 25 mg tablet Take 0.5 tablets (12.5 mg total) by mouth daily 45 tablet 3    vitamin E,dl-alpha tocopherol, (VITAMIN E, BULK, MISC) Take 1 tablet by mouth daily  (Patient not taking: Reported on 2025)       No current facility-administered medications for this visit.         Objective     There were no vitals taken for this visit.    Assessment/Plan      Anticoagulation Summary  As of 2025      INR goal:  2.0-3.0   INR used for dosin.6 (2025)   Full warfarin instructions:  2 mg every Sun, Tue, Thu; 4 mg all other days   No change documented:  Jany Yuen, PharmD   Next INR check:  2025   Priority:  High    Indications    Persistent atrial fibrillation (CMS/HCC) [I48.19]                 Anticoagulation Care Providers       Provider Role Specialty Phone number    Avery Gordon MD Referring Family Medicine [phone]            Anticoagulation therapy status: INR is stable, no dose adjustment required.  Patient verbalized understanding regarding dose, monitoring and INR.    Follow-up INR in 2 weeks      Please reference Anticoagulation episode for additional documentation.

## 2025-07-30 NOTE — HOME HEALTH
Subjective/Objective:  Patient sitting at [place] upon my arrival. He asked if he could use his massage gun on his leg and I educated on minimal use until legs have fully healed, but that it may help with any discomfort he may have with tight muscles as they heal. Blood drawn without difficulty for PT/INR. Wound assessed and healed. Charted appropriately. I educated on a high protein diet and the importance of wound healing. Pt and wife verbalized understanding. All other questions/concerns answered throughout visit.    Assessment:  Response to care: open to care    Progress toward previous goals: care/treatment is still necessary    Potential barriers: co-morbidities    Patient strengths: high level of prior function, motivated and strong support system/involved caregiver    Education/instructions provided on Medications frequency on Tylenol, Nutrition/hydration related to wound healing and skin integrity and Wound/skin care related to hand hygiene and s/sx of infection or complication    Education provided to patient, family and caregiver through explanation. Patient demonstrated acceptance and verbalizes understanding.    Plan:  Continued need for skilled services: Skilled Nursing: disease education    Care plan reviewed and appropriate. Plan for next visit to include providing education on CHF, diet, and Warfarin side effects.    Plan of Care Updates:   Plan of Care in development was verbally reviewed with the patient and family and  included visit schedule and visit frequency.

## 2025-07-31 ENCOUNTER — HOME CARE VISIT (OUTPATIENT)
Dept: HOME HEALTH SERVICES | Facility: HOME HEALTH | Age: 65
End: 2025-07-31
Payer: MEDICARE

## 2025-07-31 ENCOUNTER — HOME CARE VISIT (OUTPATIENT)
Dept: HOME HEALTH SERVICES | Facility: HOSPICE | Age: 65
End: 2025-07-31
Payer: MEDICARE

## 2025-07-31 VITALS
SYSTOLIC BLOOD PRESSURE: 105 MMHG | TEMPERATURE: 97.8 F | HEART RATE: 70 BPM | OXYGEN SATURATION: 96 % | DIASTOLIC BLOOD PRESSURE: 62 MMHG

## 2025-07-31 PROCEDURE — G0151 HHCP-SERV OF PT,EA 15 MIN: HCPCS | Mod: CQ,PPS

## 2025-07-31 PROCEDURE — G0156 HHCP-SVS OF AIDE,EA 15 MIN: HCPCS | Mod: PPS

## 2025-07-31 ASSESSMENT — ENCOUNTER SYMPTOMS: DENIES PAIN: 1

## 2025-07-31 ASSESSMENT — ACTIVITIES OF DAILY LIVING (ADL)
ENTERING_EXITING_HOME: ONE PERSON
TRANSPORTATION_METHOD: FAMILY
TRANSPORTATION_METHOD: REGULAR CAR

## 2025-07-31 NOTE — HOME HEALTH
Subjective:  Pt reports he's doing well today. States he's been doing his exercises daily. Pt notes a lump on his right/side/central abdomen. Upon palpation, there is a hard lump approx. 1-2 centimeters wide. Pt states he has no pain with it, but has been feeling nauseous again. Will notify nurse for further recommendation/assessment.    Objective clinical findings related to patient's need for care:  Vitals assessed: WNL    PROM/gentle stretching provided to L knee into flexion/extension, 30 second holds x 5 each direction. Propped pillow under ankle and used gravity for extension stretch, with pt tolerating minimal over pressure.     Reviewed/practiced HEP for LLE exercises:  Supine:  -quad sets x 10  -heel slides x 10  -hip abduction slides x 10    Seated at EOB:  -LAQ x 10  -marches x 10    Pt declined further exercises due to L knee discomfort.     Assessment: Pt slowly progressing with POC.  Response to care: Pt tolerated tx fairly well, but did note discomfort with stretching. L knee is quite stiff and lacking in both flexion/extension.    Progress toward previous goals: patient is progressing with current care/treatment and care/treatment is still necessary    Potential barriers: co-morbidities    Patient strengths: high level of prior function, motivated and strong support system/involved caregiver    Education/instructions provided on Fall/injury prevention related to removal fall/trip hazards and use of assistive device, as well as instructions for stretching L knee independently and regularly due to lack of extension/flexion.    Education provided to patient, family and caregiver through explanation and demonstration.  Patient demonstrated acceptance and patient verbalizes understanding.    Plan:  Continued need for skilled services:  Physical Therapy: strengthening and balance, endurance, range of motion, pain management, gait training and transfer training  Care coordination report provided to PT.  Individuals reported to [facility].  Report included pt status/progress with POC.      Care plan reviewed and appropriate. Plan for next visit to include continue with strengthening, ROM, balance, and gait training as able.    Plan of Care Updates:   Plan of Care in development was verbally reviewed with the patient and  included visit schedule. A copy of the plan of care was given to the patient.

## 2025-08-04 ENCOUNTER — HOME CARE VISIT (OUTPATIENT)
Dept: HOME HEALTH SERVICES | Facility: HOME HEALTH | Age: 65
End: 2025-08-04
Payer: MEDICARE

## 2025-08-04 ENCOUNTER — TELEPHONE (OUTPATIENT)
Dept: FAMILY MEDICINE | Facility: CLINIC | Age: 65
End: 2025-08-04
Payer: MEDICARE

## 2025-08-04 ENCOUNTER — HOME CARE VISIT (OUTPATIENT)
Dept: HOME HEALTH SERVICES | Facility: HOSPICE | Age: 65
End: 2025-08-04
Payer: MEDICARE

## 2025-08-04 VITALS
OXYGEN SATURATION: 96 % | SYSTOLIC BLOOD PRESSURE: 130 MMHG | RESPIRATION RATE: 16 BRPM | HEART RATE: 61 BPM | DIASTOLIC BLOOD PRESSURE: 76 MMHG | TEMPERATURE: 98.1 F

## 2025-08-04 DIAGNOSIS — I48.19 PERSISTENT ATRIAL FIBRILLATION (CMS/HCC): Chronic | ICD-10-CM

## 2025-08-04 PROCEDURE — G0299 HHS/HOSPICE OF RN EA 15 MIN: HCPCS | Mod: PPS

## 2025-08-04 PROCEDURE — G0156 HHCP-SVS OF AIDE,EA 15 MIN: HCPCS | Mod: PPS

## 2025-08-04 RX ORDER — WARFARIN 2 MG/1
TABLET ORAL
Qty: 45 TABLET | Refills: 3 | Status: SHIPPED | OUTPATIENT
Start: 2025-08-04

## 2025-08-04 RX ORDER — WARFARIN 4 MG/1
TABLET ORAL
Qty: 45 TABLET | Refills: 3 | Status: SHIPPED | OUTPATIENT
Start: 2025-08-04

## 2025-08-04 ASSESSMENT — ACTIVITIES OF DAILY LIVING (ADL)
TRANSPORTATION_METHOD: REGULAR CAR
TRANSPORTATION_METHOD: FAMILY
CURRENT_FUNCTION: STAND BY ASSIST
ENTERING_EXITING_HOME: SUPERVISION

## 2025-08-04 ASSESSMENT — ENCOUNTER SYMPTOMS
DYSPNEA ON EXERTION: 1
DENIES PAIN: 1
FATIGUES EASILY: 1
MUSCLE WEAKNESS: 1
DYSPNEA ACTIVITY LEVEL: AFTER AMBULATING MORE THAN 20 FT
PERSON REPORTING PAIN: PATIENT
SHORTNESS OF BREATH: 1
LAST BOWEL MOVEMENT: 67421

## 2025-08-05 RX ORDER — WARFARIN 2 MG/1
TABLET ORAL
Qty: 45 TABLET | Refills: 0 | OUTPATIENT
Start: 2025-08-05

## 2025-08-05 RX ORDER — WARFARIN 4 MG/1
TABLET ORAL
Qty: 45 TABLET | Refills: 0 | OUTPATIENT
Start: 2025-08-05

## 2025-08-07 ENCOUNTER — HOME CARE VISIT (OUTPATIENT)
Dept: HOME HEALTH SERVICES | Facility: HOSPICE | Age: 65
End: 2025-08-07
Payer: MEDICARE

## 2025-08-07 VITALS
HEART RATE: 88 BPM | OXYGEN SATURATION: 98 % | SYSTOLIC BLOOD PRESSURE: 128 MMHG | DIASTOLIC BLOOD PRESSURE: 78 MMHG | TEMPERATURE: 98 F | RESPIRATION RATE: 16 BRPM

## 2025-08-07 PROCEDURE — G0156 HHCP-SVS OF AIDE,EA 15 MIN: HCPCS | Mod: PPS

## 2025-08-07 PROCEDURE — G0151 HHCP-SERV OF PT,EA 15 MIN: HCPCS | Mod: PPS | Performed by: GENERAL ACUTE CARE HOSPITAL

## 2025-08-07 ASSESSMENT — ENCOUNTER SYMPTOMS
PAIN LOCATION - PAIN QUALITY: DULL
PAIN SEVERITY GOAL: 0/10
PAIN LOCATION - EXACERBATING FACTORS: BENDING KNEE
HIGHEST PAIN SEVERITY IN PAST 24 HOURS: 3/10
RESTLESSNESS: 1
PERSON REPORTING PAIN: PATIENT
PAIN LOCATION - PAIN SEVERITY: 3/10
PAIN LOCATION - RELIEVING FACTORS: REST, MEDS
SUBJECTIVE PAIN PROGRESSION: WAXING AND WANING
PAIN: 1
PAIN LOCATION - PAIN FREQUENCY: INTERMITTENT
PAIN LOCATION: LEFT KNEE
LOWEST PAIN SEVERITY IN PAST 24 HOURS: 0/10

## 2025-08-07 ASSESSMENT — ACTIVITIES OF DAILY LIVING (ADL)
ENTERING_EXITING_HOME: ONE PERSON
TRANSPORTATION_METHOD: REGULAR CAR
TRANSPORTATION_METHOD: FAMILY

## 2025-08-08 ENCOUNTER — TELEPHONE (OUTPATIENT)
Dept: ORTHOPEDIC SURGERY | Facility: CLINIC | Age: 65
End: 2025-08-08
Payer: MEDICARE

## 2025-08-08 DIAGNOSIS — Z96.652 S/P TOTAL KNEE ARTHROPLASTY, LEFT: ICD-10-CM

## 2025-08-08 RX ORDER — ONDANSETRON 4 MG/1
4 TABLET, FILM COATED ORAL EVERY 6 HOURS PRN
Qty: 30 TABLET | Refills: 0 | Status: SHIPPED | OUTPATIENT
Start: 2025-08-08

## 2025-08-11 ENCOUNTER — HOME CARE VISIT (OUTPATIENT)
Dept: HOME HEALTH SERVICES | Facility: HOSPICE | Age: 65
End: 2025-08-11
Payer: MEDICARE

## 2025-08-11 PROCEDURE — G0156 HHCP-SVS OF AIDE,EA 15 MIN: HCPCS | Mod: PPS

## 2025-08-13 ENCOUNTER — TELEPHONE (OUTPATIENT)
Dept: ORTHOPEDIC SURGERY | Facility: CLINIC | Age: 65
End: 2025-08-13
Payer: MEDICARE

## 2025-08-13 ENCOUNTER — HOME CARE VISIT (OUTPATIENT)
Dept: HOME HEALTH SERVICES | Facility: HOSPICE | Age: 65
End: 2025-08-13
Payer: MEDICARE

## 2025-08-13 VITALS — RESPIRATION RATE: 16 BRPM | HEART RATE: 84 BPM | OXYGEN SATURATION: 97 % | TEMPERATURE: 97.4 F

## 2025-08-13 DIAGNOSIS — Z96.652 S/P TOTAL KNEE ARTHROPLASTY, LEFT: Primary | ICD-10-CM

## 2025-08-13 PROCEDURE — G0151 HHCP-SERV OF PT,EA 15 MIN: HCPCS | Mod: PPS | Performed by: GENERAL ACUTE CARE HOSPITAL

## 2025-08-13 ASSESSMENT — ENCOUNTER SYMPTOMS
PAIN LOCATION - RELIEVING FACTORS: REST, ICE
PAIN LOCATION: LEFT KNEE
PAIN: 1
PAIN SEVERITY GOAL: 0/10
LOWEST PAIN SEVERITY IN PAST 24 HOURS: 1/10
PERSON REPORTING PAIN: PATIENT
HIGHEST PAIN SEVERITY IN PAST 24 HOURS: 3/10
PAIN LOCATION - PAIN QUALITY: DULL
RESTLESSNESS: 1
PAIN LOCATION - PAIN SEVERITY: 3/10
PAIN LOCATION - PAIN FREQUENCY: INTERMITTENT
PAIN LOCATION - EXACERBATING FACTORS: BENDING
SUBJECTIVE PAIN PROGRESSION: WAXING AND WANING

## 2025-08-13 ASSESSMENT — ACTIVITIES OF DAILY LIVING (ADL)
TRANSPORTATION_METHOD: REGULAR CAR
TRANSPORTATION_METHOD: FAMILY
ENTERING_EXITING_HOME: ONE PERSON

## 2025-08-14 ENCOUNTER — OFFICE VISIT (OUTPATIENT)
Dept: FAMILY MEDICINE | Facility: CLINIC | Age: 65
End: 2025-08-14
Payer: MEDICARE

## 2025-08-14 ENCOUNTER — HOME CARE VISIT (OUTPATIENT)
Dept: HOME HEALTH SERVICES | Facility: HOSPICE | Age: 65
End: 2025-08-14
Payer: MEDICARE

## 2025-08-14 VITALS
BODY MASS INDEX: 35.74 KG/M2 | OXYGEN SATURATION: 96 % | TEMPERATURE: 98.6 F | HEART RATE: 51 BPM | DIASTOLIC BLOOD PRESSURE: 76 MMHG | WEIGHT: 309.2 LBS | SYSTOLIC BLOOD PRESSURE: 126 MMHG | RESPIRATION RATE: 18 BRPM

## 2025-08-14 DIAGNOSIS — K21.00 GASTROESOPHAGEAL REFLUX DISEASE WITH ESOPHAGITIS WITHOUT HEMORRHAGE: ICD-10-CM

## 2025-08-14 DIAGNOSIS — E66.01 MORBID OBESITY (CMS/HCC): ICD-10-CM

## 2025-08-14 DIAGNOSIS — I50.22 CHRONIC SYSTOLIC HEART FAILURE (CMS/HCC): Chronic | ICD-10-CM

## 2025-08-14 DIAGNOSIS — I48.19 PERSISTENT ATRIAL FIBRILLATION (CMS/HCC): Chronic | ICD-10-CM

## 2025-08-14 DIAGNOSIS — I10 ESSENTIAL HYPERTENSION: Primary | Chronic | ICD-10-CM

## 2025-08-14 PROCEDURE — G0156 HHCP-SVS OF AIDE,EA 15 MIN: HCPCS | Mod: PPS

## 2025-08-14 PROCEDURE — G0463 HOSPITAL OUTPT CLINIC VISIT: HCPCS | Performed by: FAMILY MEDICINE

## 2025-08-14 RX ORDER — PANTOPRAZOLE SODIUM 40 MG/1
40 TABLET, DELAYED RELEASE ORAL DAILY
Qty: 90 TABLET | Refills: 3 | Status: SHIPPED | OUTPATIENT
Start: 2025-08-14 | End: 2026-08-14

## 2025-08-18 ENCOUNTER — HOME CARE VISIT (OUTPATIENT)
Dept: HOME HEALTH SERVICES | Facility: HOSPICE | Age: 65
End: 2025-08-18
Payer: MEDICARE

## 2025-08-18 ENCOUNTER — HOME CARE VISIT (OUTPATIENT)
Dept: HOME HEALTH SERVICES | Facility: HOME HEALTH | Age: 65
End: 2025-08-18
Payer: MEDICARE

## 2025-08-18 VITALS
SYSTOLIC BLOOD PRESSURE: 132 MMHG | OXYGEN SATURATION: 95 % | TEMPERATURE: 97.2 F | RESPIRATION RATE: 16 BRPM | DIASTOLIC BLOOD PRESSURE: 85 MMHG | HEART RATE: 99 BPM

## 2025-08-18 PROCEDURE — G0151 HHCP-SERV OF PT,EA 15 MIN: HCPCS | Mod: PPS | Performed by: GENERAL ACUTE CARE HOSPITAL

## 2025-08-18 ASSESSMENT — ENCOUNTER SYMPTOMS: DENIES PAIN: 1

## 2025-08-18 ASSESSMENT — ACTIVITIES OF DAILY LIVING (ADL)
TRANSPORTATION_METHOD: REGULAR CAR
OASIS_M1830: 00
HOME_HEALTH_OASIS: 00
TRANSPORTATION_METHOD: FAMILY

## 2025-08-19 ENCOUNTER — ANCILLARY PROCEDURE (OUTPATIENT)
Dept: RADIOLOGY | Facility: CLINIC | Age: 65
End: 2025-08-19
Payer: MEDICARE

## 2025-08-19 ENCOUNTER — HOME CARE VISIT (OUTPATIENT)
Dept: HOME HEALTH SERVICES | Facility: HOME HEALTH | Age: 65
End: 2025-08-19
Payer: MEDICARE

## 2025-08-19 ENCOUNTER — OFFICE VISIT (OUTPATIENT)
Dept: ORTHOPEDIC SURGERY | Facility: CLINIC | Age: 65
End: 2025-08-19
Payer: MEDICARE

## 2025-08-19 VITALS — DIASTOLIC BLOOD PRESSURE: 78 MMHG | SYSTOLIC BLOOD PRESSURE: 120 MMHG

## 2025-08-19 DIAGNOSIS — Z96.652 S/P TOTAL KNEE ARTHROPLASTY, LEFT: Primary | ICD-10-CM

## 2025-08-19 PROCEDURE — 99024 POSTOP FOLLOW-UP VISIT: CPT | Performed by: PHYSICIAN ASSISTANT

## 2025-08-19 PROCEDURE — 77073 BONE LENGTH STUDIES: CPT | Mod: PO

## 2025-08-19 PROCEDURE — 73562 X-RAY EXAM OF KNEE 3: CPT | Mod: 26,LT,NCP | Performed by: RADIOLOGY

## 2025-08-19 PROCEDURE — 77073 BONE LENGTH STUDIES: CPT | Mod: 26 | Performed by: RADIOLOGY

## 2025-08-19 PROCEDURE — 73562 X-RAY EXAM OF KNEE 3: CPT | Mod: LT,PO

## 2025-08-21 ENCOUNTER — HOSPITAL ENCOUNTER (OUTPATIENT)
Dept: PHYSICAL THERAPY | Facility: HOSPITAL | Age: 65
Setting detail: THERAPIES SERIES
Discharge: STILL ADMITTED OR ADMINISTRATIVE | End: 2025-08-21
Payer: MEDICARE

## 2025-08-21 DIAGNOSIS — M25.662 DECREASED RANGE OF MOTION OF LEFT KNEE: ICD-10-CM

## 2025-08-21 DIAGNOSIS — Z96.652 S/P TOTAL KNEE ARTHROPLASTY, LEFT: Primary | ICD-10-CM

## 2025-08-21 PROCEDURE — 97110 THERAPEUTIC EXERCISES: CPT | Mod: GP | Performed by: PHYSICAL THERAPIST

## 2025-08-21 PROCEDURE — 97162 PT EVAL MOD COMPLEX 30 MIN: CPT | Mod: GP | Performed by: PHYSICAL THERAPIST

## 2025-08-29 ENCOUNTER — HOSPITAL ENCOUNTER (OUTPATIENT)
Dept: PHYSICAL THERAPY | Facility: HOSPITAL | Age: 65
Setting detail: THERAPIES SERIES
Discharge: STILL ADMITTED OR ADMINISTRATIVE | End: 2025-08-29
Payer: MEDICARE

## 2025-08-29 DIAGNOSIS — M25.662 DECREASED RANGE OF MOTION OF LEFT KNEE: ICD-10-CM

## 2025-08-29 DIAGNOSIS — Z96.652 S/P TOTAL KNEE ARTHROPLASTY, LEFT: Primary | ICD-10-CM

## 2025-08-29 PROCEDURE — 97110 THERAPEUTIC EXERCISES: CPT | Mod: GP

## 2025-09-03 ENCOUNTER — HOSPITAL ENCOUNTER (OUTPATIENT)
Dept: PHYSICAL THERAPY | Facility: HOSPITAL | Age: 65
Setting detail: THERAPIES SERIES
Discharge: STILL ADMITTED OR ADMINISTRATIVE | End: 2025-09-03
Payer: MEDICARE

## 2025-09-03 DIAGNOSIS — M25.662 DECREASED RANGE OF MOTION OF LEFT KNEE: ICD-10-CM

## 2025-09-03 DIAGNOSIS — Z96.652 S/P TOTAL KNEE ARTHROPLASTY, LEFT: Primary | ICD-10-CM

## 2025-09-03 PROCEDURE — 97110 THERAPEUTIC EXERCISES: CPT | Mod: GP

## 2025-09-05 ENCOUNTER — HOSPITAL ENCOUNTER (OUTPATIENT)
Dept: PHYSICAL THERAPY | Facility: HOSPITAL | Age: 65
Setting detail: THERAPIES SERIES
Discharge: STILL ADMITTED OR ADMINISTRATIVE | End: 2025-09-05
Payer: MEDICARE

## 2025-09-05 DIAGNOSIS — Z96.652 S/P TOTAL KNEE ARTHROPLASTY, LEFT: Primary | ICD-10-CM

## 2025-09-05 DIAGNOSIS — M25.662 DECREASED RANGE OF MOTION OF LEFT KNEE: ICD-10-CM

## 2025-09-05 PROCEDURE — 97110 THERAPEUTIC EXERCISES: CPT | Mod: GP

## (undated) DEVICE — DRESSING MEPILEX BORDER FLEX LITE 2X5

## (undated) DEVICE — MANIFOLD 4 PORT NEPTUNE

## (undated) DEVICE — GLOVE SURG PROTEXIS PI BLUE SZ 6.5 UNDERGLOVE

## (undated) DEVICE — Device

## (undated) DEVICE — BANDAGE CO-FLEX 6"X5YD STL SELF ADHESIVE

## (undated) DEVICE — TOURNIQUET 44X4 2 PART QUICK 1 BLADDER DISP

## (undated) DEVICE — BLADE PROTECTED SS #11 USE W/ITEM #0059760

## (undated) DEVICE — SYSTEM POLAR CARE WAVE COLD THERAPY

## (undated) DEVICE — INTERPULSE UNIT DISP IRRIGATION UNIT

## (undated) DEVICE — SEALER BIPOLAR 6.0 AQUAMANTYS 5.7 X 3.48 MM 30D MIN 5 EA

## (undated) DEVICE — PAD BOVIE ADULT 9' CORD REM ELECTRODE PATIENT RETURN

## (undated) DEVICE — BLADE SAGITTAL 18.0X1.27X90MM

## (undated) DEVICE — GLOVE SENSICARE SLT 6.5 SURG

## (undated) DEVICE — CLOSURE SKIN DERMABOND PRINEO 42CM

## (undated) DEVICE — GLOVE BIOGEL PI INDICATOR SIZE 8.0 UNDERGLOVE 0

## (undated) DEVICE — DRAPE IOBAN 2 60CM X 45CM @

## (undated) DEVICE — IRRIGATION WOUND IRRISEPT WOUND DEBRIDEMENT SYSTEM

## (undated) DEVICE — DRESSING 4X12 INCH MEPILEX BORDER POST OP NON-AG

## (undated) DEVICE — GUIDE NAVIGATION ORTHO SCAN

## (undated) DEVICE — TIP HIGH FLOW INTERPULSE BONE CLEANING HIP

## (undated) DEVICE — SUTURE VICRYL 1 CP-1

## (undated) DEVICE — DRILL PIN CAS FIX 3.2D X 80MM

## (undated) DEVICE — GLOVE SURG SENSICARE PI ORTHO 8.0 LF PF

## (undated) DEVICE — GOWN SURGICAL AERO CHROME W/TOWEL XLG X-LONG LEVEL 4 STL

## (undated) DEVICE — DRAPE IOBAN INCISE 23X23

## (undated) DEVICE — BAG DECANTER II

## (undated) DEVICE — GLOVE SURG PROTEXIS PI BLUE SZ 7.0 UNDERGLOVE

## (undated) DEVICE — DRESSING MEPILEX BORDER 4X8" 10X20CM STERILE

## (undated) DEVICE — TRAY TOTAL JOINT CUSTOM SPH

## (undated) DEVICE — GLOVE SENSICARE SLT 7.5 SURG STL POWDER FREE

## (undated) DEVICE — DRAPE UNIT ROSA ROBOTIC

## (undated) DEVICE — GLOVE SENSICARE SLT 7.0 SURG

## (undated) DEVICE — SYRINGE 20CC LUER LOCK TIP STL MONOJECT

## (undated) DEVICE — SYSTEM MIX CLEAR BONE CEMENT MIXER

## (undated) DEVICE — PREP SKIN CHLORAPREP ORNG 26ML STL

## (undated) DEVICE — BANDAGE ACE WRAP DBL 6IN NST ABCC8538610000

## (undated) DEVICE — PAD KNEE LARGE COLD COMPRESSION

## (undated) DEVICE — NEEDLE HYPO 18G 1 1/2" SAFETY MONOJECT MAGELLAN

## (undated) DEVICE — FIBERWIRE 2 BLUE WITHTAPER NEEDL BRAIDED POLY SUTURE 38" T-5 TAPER

## (undated) DEVICE — DRILL PIN CAS FIX 3.2D X 150MM

## (undated) DEVICE — GLOVE SURG PROTEXIS PI BLUE SZ 7.5 UNDERGLOVE

## (undated) DEVICE — SUTURE VICRYL 2-0 CT2 27" UNDYED